# Patient Record
Sex: FEMALE | Race: WHITE | NOT HISPANIC OR LATINO | Employment: UNEMPLOYED | URBAN - METROPOLITAN AREA
[De-identification: names, ages, dates, MRNs, and addresses within clinical notes are randomized per-mention and may not be internally consistent; named-entity substitution may affect disease eponyms.]

---

## 2017-09-01 ENCOUNTER — CONVERSION ENCOUNTER (OUTPATIENT)
Dept: MAMMOGRAPHY | Facility: HOSPITAL | Age: 52
End: 2017-09-01

## 2018-01-25 ENCOUNTER — CONVERSION ENCOUNTER (OUTPATIENT)
Dept: FAMILY MEDICINE CLINIC | Facility: CLINIC | Age: 53
End: 2018-01-25

## 2018-01-25 ENCOUNTER — OFFICE VISIT CONVERTED (OUTPATIENT)
Dept: FAMILY MEDICINE CLINIC | Facility: CLINIC | Age: 53
End: 2018-01-25
Attending: NURSE PRACTITIONER

## 2018-05-11 ENCOUNTER — OFFICE VISIT CONVERTED (OUTPATIENT)
Dept: FAMILY MEDICINE CLINIC | Facility: CLINIC | Age: 53
End: 2018-05-11
Attending: NURSE PRACTITIONER

## 2020-04-13 ENCOUNTER — OFFICE VISIT CONVERTED (OUTPATIENT)
Dept: FAMILY MEDICINE CLINIC | Facility: CLINIC | Age: 55
End: 2020-04-13
Attending: NURSE PRACTITIONER

## 2020-04-29 ENCOUNTER — OFFICE VISIT CONVERTED (OUTPATIENT)
Dept: OTHER | Facility: HOSPITAL | Age: 55
End: 2020-04-29
Attending: PHYSICIAN ASSISTANT

## 2020-04-29 ENCOUNTER — HOSPITAL ENCOUNTER (OUTPATIENT)
Dept: OTHER | Facility: HOSPITAL | Age: 55
Discharge: HOME | End: 2020-04-29
Attending: PHYSICIAN ASSISTANT

## 2020-04-30 LAB — SARS-COV-2 RNA SPEC QL NAA+PROBE: NOT DETECTED

## 2020-05-11 ENCOUNTER — HOSPITAL ENCOUNTER (OUTPATIENT)
Dept: OTHER | Facility: HOSPITAL | Age: 55
Discharge: HOME | End: 2020-05-11
Attending: NURSE PRACTITIONER

## 2020-05-11 LAB
25(OH)D3 SERPL-MCNC: 47.5 NG/ML (ref 30–100)
ALBUMIN SERPL-MCNC: 3.8 G/DL (ref 3.5–5)
ALBUMIN/GLOB SERPL: 1.5 {RATIO} (ref 1.4–2.6)
ALP SERPL-CCNC: 106 U/L (ref 53–141)
ALT SERPL-CCNC: 10 U/L (ref 10–40)
ANION GAP SERPL CALC-SCNC: 12 MMOL/L (ref 8–19)
AST SERPL-CCNC: 18 U/L (ref 15–50)
BASOPHILS # BLD AUTO: 0.04 10*3/UL (ref 0–0.2)
BASOPHILS NFR BLD AUTO: 0.8 % (ref 0–3)
BILIRUB SERPL-MCNC: 0.34 MG/DL (ref 0.2–1.3)
BUN SERPL-MCNC: 19 MG/DL (ref 5–25)
BUN/CREAT SERPL: 26 {RATIO} (ref 6–20)
CALCIUM SERPL-MCNC: 9.1 MG/DL (ref 8.7–10.4)
CHLORIDE SERPL-SCNC: 101 MMOL/L (ref 99–111)
CHOLEST SERPL-MCNC: 142 MG/DL (ref 107–200)
CHOLEST/HDLC SERPL: 1.8 {RATIO} (ref 3–6)
CONV ABS IMM GRAN: 0.01 10*3/UL (ref 0–0.2)
CONV CO2: 28 MMOL/L (ref 22–32)
CONV IMMATURE GRAN: 0.2 % (ref 0–1.8)
CONV TOTAL PROTEIN: 6.4 G/DL (ref 6.3–8.2)
CREAT UR-MCNC: 0.72 MG/DL (ref 0.5–0.9)
DEPRECATED RDW RBC AUTO: 49.1 FL (ref 36.4–46.3)
EOSINOPHIL # BLD AUTO: 0.27 10*3/UL (ref 0–0.7)
EOSINOPHIL # BLD AUTO: 5.1 % (ref 0–7)
ERYTHROCYTE [DISTWIDTH] IN BLOOD BY AUTOMATED COUNT: 14.1 % (ref 11.7–14.4)
GFR SERPLBLD BASED ON 1.73 SQ M-ARVRAT: >60 ML/MIN/{1.73_M2}
GLOBULIN UR ELPH-MCNC: 2.6 G/DL (ref 2–3.5)
GLUCOSE SERPL-MCNC: 99 MG/DL (ref 65–99)
HCT VFR BLD AUTO: 36.7 % (ref 37–47)
HDLC SERPL-MCNC: 77 MG/DL (ref 40–60)
HGB BLD-MCNC: 11.7 G/DL (ref 12–16)
LDLC SERPL CALC-MCNC: 58 MG/DL (ref 70–100)
LYMPHOCYTES # BLD AUTO: 1.04 10*3/UL (ref 1–5)
LYMPHOCYTES NFR BLD AUTO: 19.8 % (ref 20–45)
MCH RBC QN AUTO: 30.3 PG (ref 27–31)
MCHC RBC AUTO-ENTMCNC: 31.9 G/DL (ref 33–37)
MCV RBC AUTO: 95.1 FL (ref 81–99)
MONOCYTES # BLD AUTO: 0.43 10*3/UL (ref 0.2–1.2)
MONOCYTES NFR BLD AUTO: 8.2 % (ref 3–10)
NEUTROPHILS # BLD AUTO: 3.47 10*3/UL (ref 2–8)
NEUTROPHILS NFR BLD AUTO: 65.9 % (ref 30–85)
NRBC CBCN: 0 % (ref 0–0.7)
OSMOLALITY SERPL CALC.SUM OF ELEC: 286 MOSM/KG (ref 273–304)
PLATELET # BLD AUTO: 270 10*3/UL (ref 130–400)
PMV BLD AUTO: 9.1 FL (ref 9.4–12.3)
POTASSIUM SERPL-SCNC: 4.4 MMOL/L (ref 3.5–5.3)
RBC # BLD AUTO: 3.86 10*6/UL (ref 4.2–5.4)
SODIUM SERPL-SCNC: 137 MMOL/L (ref 135–147)
TRIGL SERPL-MCNC: 36 MG/DL (ref 40–150)
VIT B12 SERPL-MCNC: 351 PG/ML (ref 211–911)
VLDLC SERPL-MCNC: 7 MG/DL (ref 5–37)
WBC # BLD AUTO: 5.26 10*3/UL (ref 4.8–10.8)

## 2020-07-13 ENCOUNTER — CONVERSION ENCOUNTER (OUTPATIENT)
Dept: CARDIOLOGY | Facility: CLINIC | Age: 55
End: 2020-07-13
Attending: INTERNAL MEDICINE

## 2020-07-13 ENCOUNTER — OFFICE VISIT CONVERTED (OUTPATIENT)
Dept: CARDIOLOGY | Facility: CLINIC | Age: 55
End: 2020-07-13
Attending: INTERNAL MEDICINE

## 2020-08-19 ENCOUNTER — OFFICE VISIT CONVERTED (OUTPATIENT)
Dept: INTERNAL MEDICINE | Facility: CLINIC | Age: 55
End: 2020-08-19
Attending: PHYSICIAN ASSISTANT

## 2020-09-21 ENCOUNTER — HOSPITAL ENCOUNTER (OUTPATIENT)
Dept: OTHER | Facility: HOSPITAL | Age: 55
Discharge: HOME | End: 2020-09-21
Attending: PHYSICIAN ASSISTANT

## 2020-09-21 ENCOUNTER — OFFICE VISIT CONVERTED (OUTPATIENT)
Dept: INTERNAL MEDICINE | Facility: CLINIC | Age: 55
End: 2020-09-21
Attending: PHYSICIAN ASSISTANT

## 2020-09-21 ENCOUNTER — CONVERSION ENCOUNTER (OUTPATIENT)
Dept: INTERNAL MEDICINE | Facility: CLINIC | Age: 55
End: 2020-09-21

## 2020-09-22 LAB
C TRACH RRNA CVX QL NAA+PROBE: NOT DETECTED
N GONORRHOEA DNA SPEC QL NAA+PROBE: NOT DETECTED

## 2020-09-25 ENCOUNTER — OFFICE VISIT CONVERTED (OUTPATIENT)
Dept: INTERNAL MEDICINE | Facility: CLINIC | Age: 55
End: 2020-09-25
Attending: PHYSICIAN ASSISTANT

## 2020-09-28 LAB
CONV LAST MENSTURAL PERIOD: NORMAL
PATHOLOGIST REVIEW: NORMAL
SPECIMEN SOURCE: NORMAL
SPECIMEN SOURCE: NORMAL
THIN PREP CVX: NORMAL

## 2020-10-02 LAB — HPV HYBRID CAPTURE HIGH RISK: POSITIVE

## 2020-12-08 ENCOUNTER — OFFICE VISIT CONVERTED (OUTPATIENT)
Dept: INTERNAL MEDICINE | Facility: CLINIC | Age: 55
End: 2020-12-08
Attending: PHYSICIAN ASSISTANT

## 2021-02-08 ENCOUNTER — OFFICE VISIT CONVERTED (OUTPATIENT)
Dept: FAMILY MEDICINE CLINIC | Facility: CLINIC | Age: 56
End: 2021-02-08
Attending: NURSE PRACTITIONER

## 2021-02-08 ENCOUNTER — HOSPITAL ENCOUNTER (OUTPATIENT)
Dept: FAMILY MEDICINE CLINIC | Facility: CLINIC | Age: 56
Discharge: HOME | End: 2021-02-08
Attending: NURSE PRACTITIONER

## 2021-04-02 ENCOUNTER — OFFICE VISIT CONVERTED (OUTPATIENT)
Dept: FAMILY MEDICINE CLINIC | Facility: CLINIC | Age: 56
End: 2021-04-02
Attending: NURSE PRACTITIONER

## 2021-05-10 ENCOUNTER — HOSPITAL ENCOUNTER (OUTPATIENT)
Dept: OTHER | Facility: HOSPITAL | Age: 56
Discharge: HOME | End: 2021-05-10
Attending: NURSE PRACTITIONER

## 2021-05-10 ENCOUNTER — CONVERSION ENCOUNTER (OUTPATIENT)
Dept: FAMILY MEDICINE CLINIC | Facility: CLINIC | Age: 56
End: 2021-05-10

## 2021-05-10 ENCOUNTER — HOSPITAL ENCOUNTER (OUTPATIENT)
Dept: FAMILY MEDICINE CLINIC | Facility: CLINIC | Age: 56
Discharge: HOME | End: 2021-05-10
Attending: NURSE PRACTITIONER

## 2021-05-10 ENCOUNTER — OFFICE VISIT CONVERTED (OUTPATIENT)
Dept: FAMILY MEDICINE CLINIC | Facility: CLINIC | Age: 56
End: 2021-05-10
Attending: NURSE PRACTITIONER

## 2021-05-10 LAB
ALBUMIN SERPL-MCNC: 3.9 G/DL (ref 3.5–5)
ALBUMIN/GLOB SERPL: 1.4 {RATIO} (ref 1.4–2.6)
ALP SERPL-CCNC: 82 U/L (ref 53–141)
ALT SERPL-CCNC: 12 U/L (ref 10–40)
ANION GAP SERPL CALC-SCNC: 11 MMOL/L (ref 8–19)
AST SERPL-CCNC: 20 U/L (ref 15–50)
BASOPHILS # BLD AUTO: 0.05 10*3/UL (ref 0–0.2)
BASOPHILS NFR BLD AUTO: 0.8 % (ref 0–3)
BILIRUB SERPL-MCNC: 0.31 MG/DL (ref 0.2–1.3)
BILIRUB UR QL STRIP: NEGATIVE
BUN SERPL-MCNC: 18 MG/DL (ref 5–25)
BUN/CREAT SERPL: 28 {RATIO} (ref 6–20)
CALCIUM SERPL-MCNC: 8.8 MG/DL (ref 8.7–10.4)
CHLORIDE SERPL-SCNC: 104 MMOL/L (ref 99–111)
COLOR UR: YELLOW
CONV ABS IMM GRAN: 0.01 10*3/UL (ref 0–0.2)
CONV CLARITY OF URINE: CLEAR
CONV CO2: 28 MMOL/L (ref 22–32)
CONV IMMATURE GRAN: 0.2 % (ref 0–1.8)
CONV PROTEIN IN URINE BY AUTOMATED TEST STRIP: NEGATIVE
CONV TOTAL PROTEIN: 6.6 G/DL (ref 6.3–8.2)
CONV UROBILINOGEN IN URINE BY AUTOMATED TEST STRIP: NORMAL
CREAT UR-MCNC: 0.65 MG/DL (ref 0.5–0.9)
DEPRECATED RDW RBC AUTO: 48.9 FL (ref 36.4–46.3)
EOSINOPHIL # BLD AUTO: 0.22 10*3/UL (ref 0–0.7)
EOSINOPHIL # BLD AUTO: 3.6 % (ref 0–7)
ERYTHROCYTE [DISTWIDTH] IN BLOOD BY AUTOMATED COUNT: 14.5 % (ref 11.7–14.4)
EST. AVERAGE GLUCOSE BLD GHB EST-MCNC: 111 MG/DL
FERRITIN SERPL-MCNC: 12 NG/ML (ref 10–200)
GFR SERPLBLD BASED ON 1.73 SQ M-ARVRAT: >60 ML/MIN/{1.73_M2}
GLOBULIN UR ELPH-MCNC: 2.7 G/DL (ref 2–3.5)
GLUCOSE SERPL-MCNC: 99 MG/DL (ref 65–99)
GLUCOSE UR QL: NEGATIVE
HBA1C MFR BLD: 5.5 % (ref 3.5–5.7)
HCT VFR BLD AUTO: 35.8 % (ref 37–47)
HGB BLD-MCNC: 11.3 G/DL (ref 12–16)
HGB UR QL STRIP: NEGATIVE
IRON SATN MFR SERPL: 11 % (ref 20–55)
IRON SERPL-MCNC: 49 UG/DL (ref 60–170)
KETONES UR QL STRIP: NEGATIVE
LEUKOCYTE ESTERASE UR QL STRIP: NEGATIVE
LYMPHOCYTES # BLD AUTO: 0.97 10*3/UL (ref 1–5)
LYMPHOCYTES NFR BLD AUTO: 15.8 % (ref 20–45)
MCH RBC QN AUTO: 28.8 PG (ref 27–31)
MCHC RBC AUTO-ENTMCNC: 31.6 G/DL (ref 33–37)
MCV RBC AUTO: 91.1 FL (ref 81–99)
MONOCYTES # BLD AUTO: 0.36 10*3/UL (ref 0.2–1.2)
MONOCYTES NFR BLD AUTO: 5.9 % (ref 3–10)
NEUTROPHILS # BLD AUTO: 4.53 10*3/UL (ref 2–8)
NEUTROPHILS NFR BLD AUTO: 73.7 % (ref 30–85)
NITRITE UR QL STRIP: NEGATIVE
NRBC CBCN: 0 % (ref 0–0.7)
OSMOLALITY SERPL CALC.SUM OF ELEC: 290 MOSM/KG (ref 273–304)
PH UR STRIP.AUTO: 6 [PH]
PLATELET # BLD AUTO: 304 10*3/UL (ref 130–400)
PMV BLD AUTO: 9.9 FL (ref 9.4–12.3)
POTASSIUM SERPL-SCNC: 4.2 MMOL/L (ref 3.5–5.3)
RBC # BLD AUTO: 3.93 10*6/UL (ref 4.2–5.4)
SODIUM SERPL-SCNC: 139 MMOL/L (ref 135–147)
SP GR UR: 1.01
TIBC SERPL-MCNC: 449 UG/DL (ref 245–450)
TRANSFERRIN SERPL-MCNC: 314 MG/DL (ref 250–380)
VIT B12 SERPL-MCNC: 461 PG/ML (ref 211–911)
WBC # BLD AUTO: 6.14 10*3/UL (ref 4.8–10.8)

## 2021-05-10 NOTE — H&P
History and Physical      Patient Name: Esme Mcneil   Patient ID: [id number]   Sex: Female   YOB: 1965    Primary Care Provider: Herbie LEE    Visit Date: July 13, 2020    Provider: Shahid Jenkins MD   Location: [facility]   Location Address: [address] [place] [address], [address]   T[place]NE Palacios  [id number]   Location Phone: [phone]          Chief Complaint  · Chest pain   · Establish cardiologist      History Of Present Illness  Consult requested by: Herbie LEE   Esme Mcneil is a 55-year-old white female who is new to the area and comes to establish a cardiologist, but she also complains of chest pain once a week, described as sharp, down the left sternal border, lasts 20 to 30 minutes, not associated with activities. Mostly she says it is under a lot of stress. She does not take any nitroglycerin for it. She does have some palpitations described as a fluttery sensation. It is nonsustained, is long term and normal. She noted swelling which is mild and long term. Denies any shortness of breath. Has occasional dizziness when she gets up too fast, but no syncope, PND, or orthopnea.   PAST MEDICAL HISTORY: Hypertension; sleep apnea but she is not on a machine now because she had gastric bypass in 2002 and lost a large amount of weight; heart murmur; congestive heart failure; circulation issues with lymphedema in her lower legs.   PAST HOSPITALIZATIONS/SURGERIES: Stent to LAD in 2013; gastric bypass in 2002.   FAMILY HISTORY: Positive for diabetes and hypertension. Strongly positive for heart disease with a brother having a fatal myocardial infarction at 51. Paternal grandparents also having heart disease but not sure ages but both grandmothers have some type of heart disease, she is not sure what.   CURRENT MEDICATIONS: include Levocetirizine 5 mg daily; Valacyclovir 500 mg b.i.d.; Hydroxyzine 50 mg b.i.d.; vitamin B12 injection p.r.n.; Montelukast 10 mg daily; aspirin  81 mg daily; Trintellix 20 mg daily; vitamin D; vitamin E; Linzess 145 mcg daily; Omeprazole 40 mg daily; Epinephrine injection. The dosage and frequency of the medications were reviewed with the patient.   CHOLESTEROL STATUS: Unknown, taken in April, is not on statin. Triglyceride 36, total cholesterol 1.2, LDL 58, HDL 77. She said she had myalgias with Lipitor in the past.   PSYCHOSOCIAL HISTORY: Positive for mood change or depression. She is . She takes caffeinated pills, two in the morning to get moving. She has never used any alcohol. She used to smoke three packs a week for 6 to 7 years but quit about 22 years ago. 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   ALLERGIES: Ambien; Ampicillin; Cephalexin; Codeine; Hydromorphone; Hydrocodone; Ibuprofen; Lisinopril; Mirtazapine; Gabapentin; Amlodipine; Percocet; Prednisone; sulfa antibiotics; Vicodin.       Review of Systems  · Constitutional  o Admits  o : fatigue, recent weight changes   o Denies  o : good general health lately  · Eyes  o Denies  o : double vision  · HENT  o Admits  o : chronic sinus problem  o Denies  o : hearing loss or ringing, swollen glands in neck  · Cardiovascular  o Admits  o : chest pain, palpitations (fast, fluttering, or skipping beats), swelling (feet, ankles, hands)  o Denies  o : shortness of breath while walking or lying flat  · Respiratory  o Admits  o : asthma or wheezing  o Denies  o : shortness of breath, COPD  · Gastrointestinal  o Admits  o : nausea or vomiting  o Denies  o : ulcers  · Neurologic  o Admits  o : lightheaded or dizzy, headaches  o Denies  o : stroke  · Musculoskeletal  o Admits  o : joint pain, back pain  · Endocrine  o Admits  o : heat or cold intolerance  o Denies  o : thyroid disease, diabetes, excessive thirst or urination  · Heme-Lymph  o Admits  o : bleeding or bruising tendency, anemia     Overall she feels like her health is poor.  She is positive for weight change a long  "time ago since her bypass in 2002.  She went from 378 to the current 179 pounds.       Vitals  Date Time BP Position Site L\R Cuff Size HR RR TEMP (F) WT  HT  BMI kg/m2 BSA m2 O2 Sat HC       07/13/2020 10:04 /66 Sitting    78 - R   179lbs 0oz 5'  6.5\" 28.46 1.95     07/13/2020 10:04 /82 Sitting    78 - R                 Physical Examination  · Constitutional  o Appearance  o : Awake, alert in no acute distress.  · Eyes  o Conjunctivae  o : Conjunctivae normal.  o Pupils and Irises  o : Normal grade 1 fundoscopic exam.  · Ears, Nose, Mouth and Throat  o Oral Cavity  o :   § Oral Mucosa  § : Normal oral mucosa.  · Neck  o Inspection/Palpation  o : No JVD. No bruits noted. No lymphadenopathy. Good carotid upstroke.  · Respiratory  o Respiratory  o : Increased AP diameter with diminished breath sounds. Prolonged expiration. Negative rales or rhonchi.   · Cardiovascular  o Heart  o : PMI is displaced. S1, S2 regular. No S3. No S4. 2/6 systolic murmur at the base heard throughout the precordium. Negative diastolic murmur.  o Peripheral Vascular System  o :   § Extremities  § : Good femoral and pedal pulses. Chronic lower edema.   · Gastrointestinal  o Abdominal Examination  o : Soft. No masses or tenderness felt. No hepatosplenomegaly. Abdominal aorta is not palpable.  · Musculoskeletal  o General  o : Muscle strength is normal with normal tone.  · Skin and Subcutaneous Tissue  o General Inspection  o : Within normal limits.     Echocardiogram was ordered and reviewed.           Assessment     1.  Coronary artery disease with previous stent to the LAD.   2.  Chest pain.   3.  Hypertension.   4.  Aortic valve regurgitation.       Plan     1.  Will do a stress test to evaluate her chest pain.   2.  Start Crestor 5 mg a day due to her coronary artery disease since cholesterols are good now without a        statin.       ROSA Denson/Shahid Jenkins MD, FACC  JF/PMM/pap    This note was transcribed by Soumya " Bethany. TIAN/PMM/pap  The above service was transcribed by Soumya Sims on my behalf and I attest to the accuracy of the note.  TIAN/PM             Electronically Signed by: Petty Sims-, Other -Author on July 14, 2020 08:38:18 AM  Electronically Co-signed by: ROSA Celis -Reviewer on July 19, 2020 08:39:52 AM  Electronically Co-signed by: Shahid Jenkins MD -Reviewer on July 20, 2020 07:40:32 AM

## 2021-05-10 NOTE — H&P
History and Physical      Patient Name: Esme Mcneil   Patient ID: [id number]   Sex: Female   YOB: 1965    Primary Care Provider: Herbie LEE    Visit Date: April 29, 2020    Provider: Candy Ng PA-C   Location: [facility]   Location Address: [address] [place] [address]  [id number]   Location Phone: [phone]          Chief Complaint  · Evaluation for Respiratory Virus      History Of Present Illness  Esme Mcneil is a 54 year old /White female who presents today to the clinic for evaluation of a respiratory virus.   Date of Onset: 04/25/2020   What Symptoms: fatigue , fever, and shortness of breath   Quality of Symptoms: Patient is been having symptoms since Saturday night. The symptoms include a fever with a T-max of 100.4, shortness of breath dizziness and fatigue. She has multiple medical comorbidities including heart disease murmur and asthma. Her primary care physician sent her here to be evaluated based on her symptoms..   Anything make it worse or better: No improvement   Severity of Symptoms: Moderately bothersome   Any known Exposure to COVID-19: No known exposure       Past Medical History  ADHD; Alcoholism in remission; Allergic rhinitis due to allergen; Anemia; Anxiety disorder; Asthma; Broken Bones; Constipation; Depression; Essential hypertension; Gall stones; Genital herpes; Head injury; Heart Disease; Heart Murmur; Hemorrhoids; Hypertension; Kidney stones; Migraine Headaches; Night sweats; Post Traumatic Stress Disorder; Reflux Disease; Seizure; Sinus trouble; STD exposure         Past Surgical History  Adenoidectomy; Appendectomy; carpal tunnel; Cesarian Section; Cholecstectomy; Colonoscopy; Excision of uvula; Fatty TUMOR; Gastric Bypass; Other; Stent placment; Tonsilectomy; Tummy tuck         Medication List  albuterol sulfate 90 mcg/actuation inhalation HFA aerosol inhaler; aspirin 81 mg oral tablet,delayed release (DR/EC);  cyanocobalamin (vitamin B-12) 1,000 mcg/mL injection solution; hydroxyzine HCl 50 mg oral tablet; montelukast 10 mg oral tablet; omeprazole 40 mg oral capsule,delayed release(DR/EC); Trintellix 20 mg oral tablet; valacyclovir 500 mg oral tablet; Vitamin D3 2,000 unit oral capsule; Xyzal 5 mg oral tablet         Allergy List  Ambien; ampicillin; Bananas; Bee Stings; Cephalexin adverse reaction; Cockroach; Codiene; [place] Pollen; Darvocet-N 100; Dilaudid; Fescue Pollen; Garlic; Horses; hydrocodone-acetaminophen; ibuprofen; Keflex; lisinopril; Lortab; Neurontin; Norvasc; Percocet; prednisone; Remeron; Seafood; Shrimp allergy; Strawberry; SULFA (SULFONAMIDES); Tessalon Perles; Vicodin; Wasp stings       Allergies Reconciled  Family Medical History  Stroke; - No Family History of Colorectal Cancer; Coronary artery disease; Prostate Cancer; Diabetes; Ovarian Cancer, Family History         Reproductive History   3 Para 1 0 0 0 & Postmenopausal       Social History  Alcohol (Former); Tobacco (Former)         Immunizations  Name Date Admin   Influenza Refused   Influenza          Review of Systems  · Constitutional  o Admits  o : fatigue, fever  o Denies  o : weight gain, weight loss  · Respiratory  o Admits  o : shortness of breath, asthma  o Denies  o : cough  · Gastrointestinal  o Denies  o : nausea, vomiting, diarrhea, constipation, abdominal pain  · Integument  o Denies  o : rash  · Neurologic  o Admits  o : dizziness/vertigo  o Denies  o : headache      Vitals  Date Time BP Position Site L\R Cuff Size HR RR TEMP (F) WT  HT  BMI kg/m2 BSA m2 O2 Sat        2020 01:30 PM      69 - R  98.8     96 %          Physical Examination  · Constitutional  o Appearance  o : no acute distress, well-nourished  · Head and Face  o Head  o :   § Inspection  § : atraumatic, normocephalic  · Eyes  o Eyes  o : extraocular movements intact, no scleral icterus, no conjunctival injection  · Ears, Nose, Mouth and  Throat  o Ears  o :   § External Ears  § : normal  § Otoscopic Examination  § : normal tympanic membrane exam  o Nose  o :   § Intranasal Exam  § : sinuses nontender to palpation  o Oral Cavity  o :   § Oral Mucosa  § : moist mucous membranes  o Throat  o :   § Oropharynx  § : normal tonsils, normal oropharynx  · Respiratory  o Respiratory Effort  o : breathing comfortably, symmetric chest rise  o Auscultation of Lungs  o : clear to asculatation bilaterally, no wheezes, rales, or rhonchii  · Cardiovascular  o Heart  o :   § Auscultation of Heart  § : regular rate and rhythm, no murmurs, rubs, or gallops  o Peripheral Vascular System  o :   § Extremities  § : no edema  · Lymphatic  o Neck  o : no lymphadenopathy present  o Supraclavicular Nodes  o : no supraclavicular nodes  · Skin and Subcutaneous Tissue  o General Inspection  o : normal inspection  · Neurologic  o Mental Status Examination  o :   § Orientation  § : grossly oriented to person, place and time  o Gait and Station  o :   § Gait Screening  § : normal gait  · Psychiatric  o General  o : normal mood and affect              Assessment  · Fatigue     780.79/R53.83  · Fever     780.60/R50.9  · Shortness of breath     786.05/R06.02      Plan  · Orders  o [facility] NCOV2 (send-out) (50878) - 780.79/R53.83, 780.60/R50.9, 786.05/R06.02 - 04/29/2020  · Medications  o Medications have been Reconciled  o Transition of Care or Provider Policy  · Instructions  o Plan of Care:   o Patient instructed to seek medical attention urgently for new or worsening symptoms.  o Patient was educated on their diagnosis, treatment, and medications today.   o Recommend self monitoring. Instructions given.   o Stay home until without fever for 72 hours without using fever-reducing medications and other symptoms are gone.  o Recommends over the counter medications for symptom management.  o Follow-up with PCP in 2-3 days.  o Patient was swabbed for COVID-19. Patient was sent  home with COVID-19 handout information. Patient was informed to self isolate. Patient was given a 3 day work note given the time it takes for lab results to return and for patient to be notified. Further days off if required are explained in COVID test handout given to patient. Patient will be notified of test results. Patient was encouraged to stay hydrated. Patient was educated to use Tylenol if able, as directed. If not, patient is instructed to use fever reducing OTC meds as directed. Patient can use OTC medications as directed for symptoms.   o Electronically Identified Patient Education Materials Provided Electronically            Electronically Signed by: Candy Ng PA-C -Author on April 29, 2020 01:43:00 PM

## 2021-05-10 NOTE — PROCEDURES
"   Procedure Note      Patient Name: Esme Mcneil   Patient ID: [id number]   Sex: Female   YOB: 1965    Primary Care Provider: Herbie LEE    Visit Date: July 13, 2020    Provider: Shahid Jenkins MD   Location: [facility]   Location Address: [address] [place] [address], [address]   [place] Pal[place]  [id number]   Location Phone: [phone]          FINAL REPORT   TRANSTHORACIC ECHOCARDIOGRAM REPORT    Diagnosis: Chest pain, coronary artery disease.   Height: 5'6\" Weight: 179 B/P: 148/66 BSA: 1.91   Tech: JLW   MEASUREMENTS:  RVID (Diastole) : RVID. (NORMAL: 0.7 to 2.4 cm max)   LVID (Systole): 3.3 cm (Diastole): 5 cm . (NORMAL: 3.7 - 5.4 cm)   Posterior Wall Thickness (Diastole): 0.9 cm. (NORMAL: 0.8 - 1.1 cm)   Septal Thickness (Diastole): 1.1 cm. (NORMAL: 0.7 - 1.2 cm)   LAID (Systole): 3.2 cm. (NORMAL: 1.9 - 3.8 cm)   Aortic Root Diameter (Diastole): 2.7 cm. (NORMAL: 2.0 - 3.7 cm)   COMMENTS:  COMMENT: The patient underwent 2-D, M-Mode, and Doppler examination, including pulse-wave, continuous-wave, and color-flow Doppler analysis; the study is technically adequate. The following was observed:   FINDINGS:  MITRAL VALVE: Normal. E to F slope was normal. No evidence of any prolapse.   AORTIC VALVE: Mild to moderate fibrocalcific changes noted with partial fusion of the left and noncoronary cusp with limited opening of the aortic valve leaflets. Aortic valve area by planimetry is 1.65 cm2.   TRICUSPID VALVE: Normal.   PULMONIC VALVE: Normal.   LEFT ATRIUM: Normal; no masses seen. LA volume is 28 mL/m2.   AORTIC ROOT: Normal in size and motion.   LEFT VENTRICLE: The left ventricular chamber size is normal. The left ventricular wall thickness is normal. The left ventricular systolic function is normal with an estimated EF of 60%. No significant regional wall motion abnormalities are identified.   RIGHT ATRIUM: Normal.   RIGHT VENTRICLE: Normal size and function.   PERICARDIUM: No effusion. "   INFERIOR VENA CAVA: Diameter is 1.8 cm greater than 50% reduction with inspiration.   DOPPLER: Pulse-wave, continuous wave, and color-flow Doppler evaluation was performed. E/A ratio is 1.7. DT= 161 msec. IVRT is 119 msec. E/E' is 8. There is mild aortic valve regurgitation present. The pressure half-time if 444 msec and jet height index is 15%. There is mildly increasing velocity across the fibrotic aortic valve with a peak gradient of 16 mmHg, mean gradient of 9 mmHg with a V-max of 2.0 m/sec and an estimated aortic valve area of 2 cm2. There is tricuspid regurgitation with normal estimated pulmonary artery systolic pressure.   Faxed: 07/14/2020      CONCLUSION:  1.  Normal left ventricular chamber size with normal left ventricular systolic function.   2.  Left ventricular diastolic dysfunction.   3.  Mild aortic valve stenosis and aortic valve regurgitation.   4.  Tricuspid regurgitation with normal estimated pulmonary artery systolic pressure.       Shahid Jenkins MD, [facility]  PM/pap    This note was transcribed by Soumya Sims.  pap/pm  The above service was transcribed by Soumya Sims, and I attest to the accuracy of the note.  PM                 Electronically Signed by: Petty Sims-, Other -Author on July 14, 2020 08:08:53 AM  Electronically Co-signed by: Shahid Jenkins MD -Reviewer on July 29, 2020 05:48:55 PM

## 2021-05-10 NOTE — H&P
"   History and Physical      Patient Name: Esme Mcneil   Patient ID: [id number]   Sex: Female   YOB: 1965    Primary Care Provider: Priscilla LEE    Visit Date: February 8, 2021    Provider: ROSA Crespo   [place] [facility] [place] [address] [place] [address], [address]  [place], [place]  [id number]   Location Phone: [phone]          Chief Complaint  · establishing care  · twitching in toes      History Of Present Illness  Esme Mcneil is a 55 year old /White female who presents for evaluation and treatment of:      She is here as a new patient to establish care.  She was a previous patient of ROSA Schaeffer.  She is most recently seen RAÚL Rosas and most recently RAÚL Brady at [facility].  She states she had a Pap smear done that she was told was abnormal.  She was sent to OB/GYN Dr. Momin who repeated a Pap smear and she told her Pap smear was normal.    History of major depression and anxiety: Her PHQ-9 score 17, no suicidal ideation.  She is currently on Trintellix 20 mg daily.  She states she has been out of her hydroxyzine.  She has multiple drug allergies.  She was supposed to be referred to psychiatry Dr. Rodriguez per her last visit with Bernadette Jovel.  On the referral note it states that psychiatry has been trying to get in touch with her.  Patient states she recently got a new phone.    She states she has a history of diabetes type 2.  She has had gastric bypass surgery and has reversed her diabetes.  She states sometimes she is having low blood sugars now.  She has gained weight recently.  She is complaining of her \"toes twitching.\"  She states she has \"lymphedema\" of her lower extremities.    History of anemia, states also history of vitamin B12 deficiency but has not had vitamin B12 in a long time.    She is due for colorectal screening.  She has had a Cologuard last on 2/16/2018.  Her last mammogram was 9/1/2017. "       Past Medical History  Disease Name Date Onset Notes   ADHD --  --    Alcoholism in remission --  --    Allergic rhinitis due to allergen 08/15/2017 --    Anemia --  --    Anxiety disorder 05/02/2017 --    Asthma 08/15/2017 --    Breast cancer screening 9/1/2017 --    Broken Bones --  --    Congestive Heart Failure --  --    Constipation --  --    Depression 05/02/2017 --    Diabetes --  --    Essential hypertension 05/02/2017 --    Gall stones --  --    Genital herpes 08/15/2017 --    GERD (gastroesophageal reflux disease) 08/20/2020 Discussed symptoms with patient. Encouraged patient to add Famotidine to current med regimen rather than Bentyl. Patient will follow-up in 3mos and we will see if Famotidine helped with her symptoms. Patient understood and agreed with plan.   Head injury --  --    Head injury --  --    Heart attack --  --    Heart disease --  --    Heart Murmur --  --    Hemorrhoids --  --    Hyperlipidemia 08/20/2020 --    Hypertension --  --    IBS (irritable bowel syndrome) 08/20/2020 discussed risks of long term bentyl. Will see if sx improve with better tx of GERD sx. pt not currently having diarrhea or constipation, bloating or pain.   Kidney stones --  --    Migraine Headaches --  --    Night sweats --  --    Post Traumatic Stress Disorder --  --    Reflux Disease --  --    Seizure --  --    Shortness of Breath --  --    Sinus trouble --  --    STD exposure --  --          Past Surgical History  Procedure Name Date Notes   Adenoidectomy 1996 --    Appendectomy 1975 --    carpal tunnel --  1992 right wrist - 2003 Left hand - left hand 7-7-2016   Cesarian Section 1989 --    Cholecstectomy 2002 --    Colonoscopy 2/16/2018 cologard   Excision of uvula 1996 --    Fatty TUMOR 1993 & 2002 --    Gastric Bypass 2002 rour-en-y   Other --  tubes and overy removal 2006- removal of skin on arms 2006   Stent placment 09- --    Tonsilectomy 1996 --    Tummy tuck 2006 --          Medication  List  Name Date Started Instructions   albuterol sulfate 90 mcg/actuation inhalation HFA aerosol inhaler  inhale 2 puffs by inhalation route QD as needed   aspirin 81 mg oral tablet,delayed release (/EC) 07/25/2018 take 1 tablet (81 mg) by oral route once daily   caffeine 200 mg oral tablet  take 1 tablet by oral route daily   clonidine HCl 0.1 mg oral tablet 12/23/2020 TAKE 1 TABLET BY MOUTH EVERY NIGHT AT BEDTIME AS NEEDED FOR HOT FLASHES AND BLOOD PRESSURE   EpiPen 2-Mundo 0.3 mg/0.3 mL injection auto-injector 01/06/2021 inject 0.3 milliliter (0.3 mg) by intramuscular route once as needed for anaphylaxis   hydroxyzine HCl 50 mg oral tablet 02/04/2021 take 1 tablet (50 mg) by oral route 3 times a day.   levocetirizine 5 mg oral tablet  take 1 tablet (5 mg) by oral route once daily at bedtime   montelukast 10 mg oral tablet 09/25/2020 take 1 tablet by oral route daily for allergies   omeprazole 40 mg oral capsule,delayed release(DR/EC) 09/25/2020 take 1 capsule by oral route daily before meal for reflux   Trintellix 20 mg oral tablet 10/02/2020 take 1 tablet by oral route daily for 90 days for mood   valacyclovir 1 gram oral tablet 09/25/2020 take 1 tablet (1,000 mg) by oral route once daily for 90 days   Vitamin C 500 mg oral tablet  take 1 tablet by oral route daily   Vitamin D3 2,000 unit oral capsule 07/25/2018 take 1 capsule by oral route daily for 90 days   Xyzal 5 mg oral tablet 09/25/2020 take 1 tablet by oral route daily for 90 days at bedtime for allergies         Allergy List  Allergen Name Date Reaction Notes   Adderall --  --  --    Ambien --  --  --    ampicillin --  --  --    Bananas --  --  --    Bee Stings --  --  --    Brintellix --  --  --    Cephalexin adverse reaction --  --  --    Cockroach --  --  --    Codiene --  --  --    Haywood Pollen --  --  --    Cymbalta --  --  --    Darvocet-N 100 --  --  --    Dilaudid --  --  --    Effexor --  --  --    Fescue Pollen --  --  --    Garlic --  --   --    Horses --  --  --    hydrocodone-acetaminophen --  --  --    ibuprofen --  --  --    Keflex --  --  --    Lexapro --  --  --    lisinopril --  --  --    Lortab --  --  --    Neurontin --  --  --    Norvasc --  --  --    Paxil --  --  --    Percocet --  --  --    prednisone --  --  vomiting   Prozac --  --  --    Remeron --  --  --    Seafood --  --  --    Seroquel --  --  --    Serzone --  --  --    Shrimp allergy --  --  --    Strawberry --  --  --    SULFA (SULFONAMIDES) --  --  --    Tessalon Perles --  --  --    trazodone --  --  --    Vicodin --  --  --    Wasp stings --  --  --    Wellbutrin --  --  --    Zyprexa --  --  --        Allergies Reconciled  Family Medical History  Disease Name Relative/Age Notes   Stroke Grandmother (maternal)/  Grandmother (paternal)/   --    Heart Disease  --    - No Family History of Colorectal Cancer  --    Coronary artery disease Aunt/  Brother/  Grandfather (maternal)/  Grandfather (paternal)/  Grandmother (maternal)/  Grandmother (paternal)/  Mother/  Uncle/   --    Prostate cancer Father/   --    Diabetes Brother/  Daughter/   --    Ovarian Cancer, Family History Aunt/   --          Reproductive History  Menstrual   Menopause Status: Postmenopausal   Pregnancy Summary   Total Pregnancies: 3 Full Term: 1 Premature: 0   Ab Induced: 0 Ab Spontaneous: 0 Ectopics: 0   Multiples: 0 Livin         Social History  Finding Status Start/Stop Quantity Notes   Alcohol Former --/-- --  --    Tobacco Former --/-- --  --          Immunizations  NameDate Admin Mfg Trade Name Lot Number Route Inj VIS Given VIS Publication   Ibqpxduwp18/08/2021 [facility] Fluzone Quadrivalent [id number] IM LD 2021 08/15/2019   Comments: Patient tolerated injection well, left in stable condition.   Domqmunqw1196/13/2019 NE Not Entered  NE NE 2020    Comments:    Tdap08 NE Not Entered  NE NE 2020    Comments:          Review of Systems  · Constitutional  o Denies  o : fever,  "fatigue  · Cardiovascular  o Denies  o : lower extremity edema, claudication, chest pressure, palpitations  · Respiratory  o Denies  o : shortness of breath, wheezing, cough, hemoptysis, dyspnea on exertion  · Gastrointestinal  o Denies  o : nausea, vomiting, diarrhea, constipation, abdominal pain  · Genitourinary  o Denies  o : urgency, frequency  · Integument  o Denies  o : rash, itching  · Neurologic  o Admits  o : difficulty concentrating  o Denies  o : altered mental status  · Musculoskeletal  o Admits  o : muscle cramps  o Denies  o : joint pain, joint swelling  · Endocrine  o Admits  o : central obesity, weight gain  · Psychiatric  o Admits  o : anxiety, depression, difficulty sleeping  o Denies  o : suicidal ideation, homicidal ideation      Vitals  Date Time BP Position Site L\R Cuff Size HR RR TEMP (F) WT  HT  BMI kg/m2 BSA m2 O2 Sat FR L/min FiO2 HC       02/08/2021 01:50 /84 Sitting    84 - R  98 191lbs 0oz 5'  6.5\" 30.37 2.02 98 %            Physical Examination  · Constitutional  o Appearance  o : no acute distress, well-nourished  · Head and Face  o Head  o :   § Inspection  § : atraumatic, normocephalic  · Ears, Nose, Mouth and Throat  o Ears  o :   § External Ears  § : normal  § Otoscopic Examination  § : tympanic membrane appearance within normal limits bilaterally  o Oral Cavity  o :   § Oral Mucosa  § : moist mucous membranes  o Throat  o :   § Oropharynx  § : no inflammation or lesions present  · Neck  o Thyroid  o : gland size normal, nontender, no nodules or masses present on palpation, symmetric  · Respiratory  o Respiratory Effort  o : breathing comfortably, symmetric chest rise  o Auscultation of Lungs  o : clear to asculatation bilaterally, no wheezes, rales, or rhonchii  · Cardiovascular  o Heart  o :   § Auscultation of Heart  § : regular rate and rhythm, no murmurs, rubs, or gallops  o Peripheral Vascular System  o :   § Extremities  § : marked lower extremity edema present "   · Lymphatic  o Neck  o : no lymphadenopathy present  · Neurologic  o Mental Status Examination  o :   § Orientation  § : grossly oriented to person, place and time  o Gait and Station  o :   § Gait Screening  § : normal gait  · Psychiatric  o General  o : normal mood and affect          Assessment  · Need for influenza vaccination     V04.81/Z23  · Screening for colon cancer     V76.51/Z12.11  · Visit for screening mammogram     V76.12/Z12.31  · Anemia     285.9/D64.9  We will check labs today.  · Anxiety disorder     300.00/F41.9  I will refill her hydroxyzine today.  · Major depressive disorder     296.20/F32.2  I advised patient that psychiatry Dr. Rodriguez's office has been trying to reach her. I gave her their phone number so she can call make an appointment.  · History of diabetes mellitus, type II     V12.29/Z86.39  We will check A1c today.  · Abnormal Pap smear of cervix     795.00/R87.619  I advised her that she should have her Pap smear every year due to her history of abnormal ones and history of HPV.  · HPV (human papilloma virus) infection     079.4/B97.7  · History of gastric bypass     V45.86/Z98.84      Plan  · Orders  o Immunization Admin Fee (Single) ([facility]) (69387) - V04.81/Z23 - 02/08/2021  o Fluzone Quadrivalent Vaccine, age 6 months + (13552) - V04.81/Z23 - 02/08/2021   Vaccine - Influenza; Dose: 0.5; Site: Left Deltoid; Route: Intramuscular; Date: 02/08/2021 14:45:00; Exp: 06/30/2021; Lot: [id number]; Mfg: sanofi pasteur; TradeName: Fluzone Quadrivalent; Administered By: Charly Maharaj MA; Comment: Patient tolerated injection well, left in stable condition.  o Cologuard (72014) - V76.51/Z12.11 - 02/08/2021  o Screening Mammography; Bilateral 3D (76544, , 03314) - V76.12/Z12.31 - 02/08/2021  o B12 Folate levels (B12FO) - 285.9/D64.9 - 02/08/2021  o CBC with Auto Diff [facility] (01537) - 285.9/D64.9 - 02/08/2021  o Iron panel (iron, TIBC, transferrin saturation) (09978, 98669, 87514) - 285.9/D64.9 -  02/08/2021  o Ferritin ser/plas (44235) - 285.9/D64.9 - 02/08/2021  o Hgb A1c HM (77126) - V12.29/Z86.39 - 02/08/2021  o Thyroid Profile (65697, 99068, THYII) - 300.00/F41.9, 296.20/F32.2, 285.9/D64.9, V12.29/Z86.39 - 02/08/2021  o ACO-18: Positive screen for clinical depression using a standardized tool and a follow-up plan documented () - - 02/08/2021   17 pts.  o ACO-39: Current medications updated and reviewed (, 1159F) - - 02/08/2021  · Medications  o hydroxyzine HCl 50 mg oral tablet   SIG: take 1 tablet (50 mg) by oral route 3 times a day.   DISP: (90) Tablet with 2 refills  Adjusted on 02/08/2021     o Medications have been Reconciled  o Transition of Care or Provider Policy  · Instructions  o The patient and I discussed the need for therapy, either with a certified counselor, psychologist, and/or family . If no improvement is noted or worsening of their condition, return to office or ER. But also discussed with patient that if they are non-responsive to the type of medication they may need to see a psychiatrist for further evaluation and management.   o Patient was educated/instructed on their diagnosis, treatment and medications prior to discharge from the clinic today.  o Patient instructed to seek medical attention urgently for new or worsening symptoms.  o Call the office with any concerns or questions.  · Disposition  o Return to clinic in 3 months            Electronically Signed by: ROSA Crespo -Author on February 8, 2021 03:16:55 PM

## 2021-05-10 NOTE — H&P
History and Physical      Patient Name: Esme Mcneil   Patient ID: [id number]   Sex: Female   YOB: 1965    Primary Care Provider: Herbie LEE    Visit Date: August 19, 2020    Provider: Ghada Dyson PA-C   Location: [facility]   Location Address: [address] [place] [address], [address]  [place], [place]  [id number]   Location Phone: [phone]          Chief Complaint  · est care       History Of Present Illness  Esme Mcneil is a 55 year old /White female who presents for evaluation and treatment of:      est care previous pcp Herbie Rosenberg   Last labs: 5/2020  last pap: 2017  Last mammo: 2017, she is due  cologard- 2018  tobacco: former smoker quit 1988. smoked for 7 years.     She has a history of bilateral carpal tunnel and has numbness and tingling in her hands. She states she is starting to lose sensation in her finger tips. She states she keeps dropping things. She states she has a lot of pain in both of her hands. She takes OTC Tylenol for her pain, but states it is no longer working. She would like a referral to hand specialist or ortho.    Patient states she has a history of IBS and was taking Bentyl for this, but was taken off because her previous PCP thought the Linzess would work better. She was then put on Linzess, but had to stop taking it due to the diarrhea it caused her to have. She states she has some nausea that comes and goes. Denies diarrhea since stopping the Linzess. Denies vomiting, abdominal pain and constipation.    GERD: has been taking omeprazole. still has gas at times.   Denies cp.    Pt states she is on disability for lymphedema in legs  She is unable to walk far distances due to swelling and pain in legs.   She needs hang tag form for DVM but does not have it notarized.    Mood: has been doing well recently. Denies si/hi.    HTN: has been taking meds as rx.   Janel cp, palpitations, jaw/shoulder pain, dizziness, syncope, loc.    herpes:  takes valacyclovir daily to prevent flare       Past Medical History  Disease Name Date Onset Notes   ADHD --  --    Alcoholism in remission --  --    Allergic rhinitis due to allergen 08/15/2017 --    Anemia --  --    Anxiety disorder 05/02/2017 --    Asthma 08/15/2017 --    Breast cancer screening 9/1/2017 --    Broken Bones --  --    Constipation --  --    Depression 05/02/2017 --    Essential hypertension 05/02/2017 --    Gall stones --  --    Genital herpes 08/15/2017 --    GERD (gastroesophageal reflux disease) 08/20/2020 Discussed symptoms with patient. Encouraged patient to add Famotidine to current med regimen rather than Bentyl. Patient will follow-up in 3mos and we will see if Famotidine helped with her symptoms. Patient understood and agreed with plan.   Head injury --  --    Heart disease --  --    Heart Murmur --  --    Hemorrhoids --  --    Hyperlipidemia 08/20/2020 --    Hypertension --  --    IBS (irritable bowel syndrome) 08/20/2020 discussed risks of long term bentyl. Will see if sx improve with better tx of GERD sx. pt not currently having diarrhea or constipation, bloating or pain.   Kidney stones --  --    Migraine Headaches --  --    Night sweats --  --    Post Traumatic Stress Disorder --  --    Reflux Disease --  --    Seizure --  --    Sinus trouble --  --    STD exposure --  --          Past Surgical History  Procedure Name Date Notes   Adenoidectomy 1996 --    Appendectomy 1975 --    carpal tunnel --  1992 right wrist - 2003 Left hand - left hand 7-7-2016   Cesarian Section 1989 --    Cholecstectomy 2002 --    Colonoscopy 2/16/2018 cologard   Excision of uvula 1996 --    Fatty TUMOR 1993 & 2002 --    Gastric Bypass 2002 rour-en-y   Other --  tubes and overy removal 2006- removal of skin on arms 2006   Stent placment 09- --    Tonsilectomy 1996 --    Tummy tuck 2006 --          Medication List  Name Date Started Instructions   albuterol sulfate 90 mcg/actuation inhalation HFA  aerosol inhaler  inhale 2 puffs by inhalation route QD as needed   aspirin 81 mg oral tablet,delayed release (DR/EC) 07/25/2018 take 1 tablet (81 mg) by oral route once daily   caffeine 200 mg oral tablet  take 1 tablet by oral route daily   carvedilol 3.125 mg oral tablet  take 1 tablet (3.125 mg) by oral route 2 times per day with food   Crestor 5 mg oral tablet  take 1 tablet (5 mg) by oral route once daily   cyanocobalamin (vitamin B-12) 1,000 mcg/mL injection solution  inject 0.1 milliliter once a week, once every 2 weeks, then once a month   EpiPen 2-Mundo 0.3 mg/0.3 mL injection auto-injector 08/03/2020 inject 0.3 milliliter (0.3 mg) by intramuscular route once as needed for anaphylaxis   famotidine 20 mg oral tablet 08/19/2020 take 1 tablet (20 mg) by oral route once daily at bedtime   hydroxyzine HCl 50 mg oral tablet  take 3 tablets (150 mg) by oral route at bedtime   montelukast 10 mg oral tablet 04/13/2020 take 1 tablet (10 mg) by oral route once daily in the evening   omeprazole 40 mg oral capsule,delayed release(DR/EC) 07/20/2020 take 1 capsule (40 mg) by oral route once daily before a meal for 30 days   Trintellix 20 mg oral tablet 07/01/2020 take 1 tablet (20 mg) by oral route once daily at the same time each day for 30 days   valacyclovir 500 mg oral tablet 05/12/2020 take 1 tablet by oral route 2 times a day for 90 days   Vitamin D3 2,000 unit oral capsule 07/25/2018 take 1 capsule by oral route daily for 90 days   Xyzal 5 mg oral tablet 04/13/2020 take 1 tablet (5 mg) by oral route once daily at bedtime         Allergy List  Allergen Name Date Reaction Notes   Adderall --  --  --    Ambien --  --  --    ampicillin --  --  --    Bananas --  --  --    Bee Stings --  --  --    Brintellix --  --  --    Cephalexin adverse reaction --  --  --    Cockroach --  --  --    Codiene --  --  --    [place] Pollen --  --  --    Cymbalta --  --  --    Darvocet-N 100 --  --  --    Dilaudid --  --  --    Effexor  --  --  --    Fescue Pollen --  --  --    Garlic --  --  --    Horses --  --  --    hydrocodone-acetaminophen --  --  --    ibuprofen --  --  --    Keflex --  --  --    Lexapro --  --  --    lisinopril --  --  --    Lortab --  --  --    Neurontin --  --  --    Norvasc --  --  --    Paxil --  --  --    Percocet --  --  --    prednisone --  --  vomiting   Prozac --  --  --    Remeron --  --  --    Seafood --  --  --    Seroquel --  --  --    Serzone --  --  --    Shrimp allergy --  --  --    Strawberry --  --  --    SULFA (SULFONAMIDES) --  --  --    Tessalon Perles --  --  --    trazodone --  --  --    Vicodin --  --  --    Wasp stings --  --  --    Wellbutrin --  --  --    Zyprexa --  --  --        Allergies Reconciled  Family Medical History  Disease Name Relative/Age Notes   Stroke Grandmother (maternal)/  Grandmother (paternal)/   --    - No Family History of Colorectal Cancer  --    Coronary artery disease Aunt/  Brother/  Grandfather (maternal)/  Grandfather (paternal)/  Grandmother (maternal)/  Grandmother (paternal)/  Mother/  Uncle/   --    Prostate cancer Father/   --    Diabetes Brother/  Daughter/   --    Ovarian Cancer, Family History Aunt/   --          Reproductive History  Menstrual   Menopause Status: Postmenopausal   Pregnancy Summary   Total Pregnancies: 3 Full Term: 1 Premature: 0   Ab Induced: 0 Ab Spontaneous: 0 Ectopics: 0   Multiples: 0 Livin         Social History  Finding Status Start/Stop Quantity Notes   Alcohol Former --/-- --  --    Tobacco Former --/-- --  --          Immunizations  NameDate Admin Mfg Trade Name Lot Number Route Inj VIS Given VIS Publication   InfluenzaRefused 2020 NE Not Entered  NE NE     Comments:    [id number]/15/2017 SKB Fluarix, quadrivalent, preservative free [id number] IM RD 09/15/2017 2015   Comments: Pt tolerated injection well   Hizgxanef6229/13/2019 NE Not Entered  NE NE 2020    Comments:    Tdap08 NE Not Entered  NE NE  "08/19/2020    Comments:          Review of Systems  · Constitutional  o Denies  o : fever, fatigue  · Eyes  o Denies  o : blurred vision, changes in vision  · HENT  o Denies  o : headaches, nasal congestion, sore throat  · Cardiovascular  o Admits  o : lower extremity edema  o Denies  o : chest pain, palpitations  · Respiratory  o Denies  o : shortness of breath, frequent cough  · Gastrointestinal  o Admits  o : nausea  o Denies  o : vomiting, diarrhea, constipation, abdominal pain  · Integument  o Denies  o : rash  · Neurologic  o Admits  o : tingling or numbness, dizziness  o Denies  o : tremor, loss of balance  · Musculoskeletal  o Admits  o : joint pain  · Psychiatric  o Denies  o : anxiety, depression, suicidal ideation, homicidal ideation      Vitals  Date Time BP Position Site L\R Cuff Size HR RR TEMP (F) WT  HT  BMI kg/m2 BSA m2 O2 Sat HC       08/15/2017 09:58 /84 Sitting    90 - R 12 98.5 217lbs 0oz 5'  6\" 35.02 2.14 99 %    10/20/2017 10:26 /92 Sitting    106 - R 12 98.3 212lbs 4oz 5'  6\" 34.26 2.12 99 %    01/25/2018 09:05 /87 Sitting    86 - R 12 98.7 209lbs 2oz 5'  6\" 33.75 2.1 97 %    07/13/2020 10:04 /66 Sitting    78 - R   179lbs 0oz 5'  6.5\" 28.46 1.95     08/19/2020 02:50 /100 Sitting    73 - R 15 98.8 184lbs 16oz 5'  6\" 29.86 1.98 98 %          Physical Examination  · Constitutional  o Appearance  o : no acute distress, well-nourished  · Head and Face  o Head  o :   § Inspection  § : atraumatic, normocephalic  · Eyes  o Eyes  o : extraocular movements intact, no scleral icterus, no conjunctival injection  · Ears, Nose, Mouth and Throat  o Ears  o :   § External Ears  § : normal  o Nose  o :   § Intranasal Exam  § : nares patent  o Oral Cavity  o :   § Oral Mucosa  § : moist mucous membranes  · Respiratory  o Respiratory Effort  o : breathing comfortably, symmetric chest rise  o Auscultation of Lungs  o : clear to asculatation bilaterally, no wheezes, rales, or " rhonchii  · Cardiovascular  o Heart  o :   § Auscultation of Heart  § : regular rate and rhythm, no murmurs, rubs, or gallops  o Peripheral Vascular System  o :   § Extremities  § : no edema  · Skin and Subcutaneous Tissue  o General Inspection  o : lymphedema present in BLE, no rash present, no lesions present, no areas of discoloration present  · Neurologic  o Mental Status Examination  o :   § Orientation  § : grossly oriented to person, place and time  o Gait and Station  o :   § Gait Screening  § : normal gait  · Psychiatric  o General  o : normal mood and affect          Assessment  · Visit for screening mammogram     V76.12/Z12.31  Patient is aware she needs an updated mammogram. Discussed difficulty finding a provider due to current insurance.  · Anxiety disorder     300.00/F41.9  Mood stable on current meds  · Asthma     493.90/J45.909  · Depression     311/F32.9  mood stable, cont current meds, to er if si/hi  · Essential hypertension     401.9/I10  Discussed bp elevation at today's visit. Discussed risks of bp elevation including death, mi, stroke, ckd, blindness. Low salt diet, increase exercise. Discussed importance of medication compliance and not missing doses. Continue current meds at this time. We will monitor at 1 wk bp check nurse visit and adjust bp medications if necessary. To er if cp, palpitations, vision loss, unilateral weakness, altered mental status. Pt understands and agrees with plan.  · GERD (gastroesophageal reflux disease)     530.81/K21.9  Discussed symptoms with patient. Encouraged patient to add Famotidine to current med regimen rather than Bentyl. Patient will follow-up in 3mos and we will see if Famotidine helped with her symptoms. Patient understood and agreed with plan.  · Hyperlipidemia     272.4/E78.5  Reviewed last labs, cont current meds and we will check labs in 3 months.  · Lymphedema     457.1/I89.0  Discussed symptoms with patient. Patient requested handicap letter due  to lymphedema and pain in BLE. Explained to pt she needs to have letter notarized before I sign the letter. Patient will follow-up in 3mos for updated labs and pap smear. Patient understood and agreed with plan.  · Genital herpes     054.10/A60.00  cont daily antiviral  · Carpal tunnel syndrome     354.0/G56.00  Discussed symptoms in bilateral hands with patient. Discussed past history of surgeries. Referred patient to hand specialist for further evaluation. Patient understood and agreed with plan.  · IBS (irritable bowel syndrome)     564.1/K58.9  discussed risks of long term bentyl. Will see if sx improve with better tx of GERD sx. pt not currently having diarrhea or constipation, bloating or pain.      Plan  · Orders  o ACO-39: Current medications updated and reviewed () - - 08/19/2020  o Mammogram breast screening 2D digital bilateral. (07217, ) - V76.12/Z12.31 - 08/19/2020  o Hand Surgeon/Consultation (HANDS) - 354.0/G56.00 - 08/19/2020  · Medications  o famotidine 20 mg oral tablet   SIG: take 1 tablet (20 mg) by oral route once daily at bedtime   DISP: (30) tablets with 1 refills  Prescribed on 08/19/2020     o Medications have been Reconciled  o Transition of Care or Provider Policy  · Instructions  o Patient advised to monitor blood pressure (B/P) at home and journal readings. Patient informed that a B/P reading at home of more than 130/80 is considered hypertension. For readings greater prou475/90 or higher patient is advised to follow up in the office with readings for management. Patient advised to limit sodium intake.  o Maintain a healthy weight. Avoid tight fitting clothes. Avoid fried, fatty foods, tomato sauce, chocolate, mint, garlic, onion, alcohol. caffeine. Eat smaller meals, dont lie down after a meal, dont smoke. Elevate the head of your bed 6-9 inches.  o Advised that cheeses and other sources of dairy fats, animal fats, fast food, and the extras (candy, pastries, pies, doughnuts  and cookies) all contain LDL raising nutrients. Advised to increase fruits, vegetables, whole grains, and to monitor portion sizes.   o Handouts were given to patient: gerd  o Patient is taking medications as prescribed and doing well.   o Patient was educated/instructed on their diagnosis, treatment and medications prior to discharge from the clinic today.  o Call the office with any concerns or questions.  o Electronically Identified Patient Education Materials Provided Electronically  · Disposition  o Call or Return if symptoms worsen or persist.  o Follow up in 1 week  o Follow up in 3 months  o Schedule a women's health/pap appt  o Care Transition  o MATTHEW Sent            Electronically Signed by: BOZENA RosasC -Author on August 20, 2020 01:22:14 PM

## 2021-05-10 NOTE — H&P
History and Physical      Patient Name: Esme Mcneil   Patient ID: [id number]   Sex: Female   YOB: 1965    Primary Care Provider: Emily LEE    Visit Date: April 13, 2020    Provider: ROSA Marrero   Location: [facility]   Location Address: [address] [place] [address], [address] [place] [address]  [id number]   Location Phone: [phone]          Chief Complaint     New Patient  Relocating to [place]       History Of Present Illness  Esme Mcneil is a 54 year old /White female who presents for evaluation and treatment of:      Presents to the office today to establish care.    She does state that she has a history of hypertension as well as coronary artery disease.  She had stents placed in her LAD.  She states that she does need a referral to cardiology for follow-up.    Patient does state that she needs refills of her Xyzal and her Singulair.  Patient does have a history of vitamin D deficiency as well as B12 deficiency.  Patient also has hypothyroidism.  She states is been approximately a year since she has had routine lab work completed.  She also states that she is needing her valcyclovir refilled.       Past Medical History  Disease Name Date Onset Notes   ADHD --  --    Alcoholism in remission --  --    Allergic rhinitis due to allergen 08/15/2017 --    Anemia --  --    Anxiety disorder 05/02/2017 --    Asthma 08/15/2017 --    Broken Bones --  --    Constipation --  --    Depression 05/02/2017 --    Essential hypertension 05/02/2017 --    Gall stones --  --    Genital herpes 08/15/2017 --    Head injury --  --    Heart Disease --  --    Heart Murmur --  --    Hemorrhoids --  --    Hypertension --  --    Kidney stones --  --    Migraine Headaches --  --    Night sweats --  --    Post Traumatic Stress Disorder --  --    Reflux Disease --  --    Seizure --  --    Sinus trouble --  --    STD exposure --  --          Past Surgical History  Procedure Name Date Notes    Adenoidectomy 1996 --    Appendectomy 1975 --    carpal tunnel --  1992 right wrist - 2003 Left hand - left hand 7-7-2016   Cesarian Section 1989 --    Cholecstectomy 2002 --    Colonoscopy --  --    Excision of uvula 1996 --    Fatty TUMOR 1993 & 2002 --    Gastric Bypass 2002 rour-en-y   Other --  tubes and overy removal 2006- removal of skin on arms 2006   Stent placment 09- --    Tonsilectomy 1996 --    Tummy tuck 2006 --          Medication List  Name Date Started Instructions   albuterol sulfate 90 mcg/actuation inhalation HFA aerosol inhaler  inhale 2 puffs by inhalation route QD as needed   aspirin 81 mg oral tablet,delayed release (DR/EC) 07/25/2018 take 1 tablet (81 mg) by oral route once daily   cyanocobalamin (vitamin B-12) 1,000 mcg/mL injection solution  inject 0.1 milliliter once a week, once every 2 weeks, then once a month   hydroxyzine HCl 50 mg oral tablet  take 1 tablet by oral route 2 times a day   montelukast 10 mg oral tablet 04/13/2020 take 1 tablet (10 mg) by oral route once daily in the evening   omeprazole 40 mg oral capsule,delayed release(DR/EC) 07/25/2018 take 1 capsule (40 mg) by oral route once daily before a meal for 30 days   Trintellix 20 mg oral tablet 08/28/2018 take 1 tablet (20 mg) by oral route once daily at the same time each day for 30 days   valacyclovir 500 mg oral tablet 04/13/2020 take 1 tablet (500 mg) by oral route once daily for 90 days   Vitamin D3 2,000 unit oral capsule 07/25/2018 take 1 capsule by oral route daily for 90 days         Allergy List  Allergen Name Date Reaction Notes   Ambien --  --  --    ampicillin --  --  --    Bananas --  --  --    Bee Stings --  --  --    Cephalexin adverse reaction --  --  --    Cockroach --  --  --    Codiene --  --  --    [place] Pollen --  --  --    Darvocet-N 100 --  --  --    Dilaudid --  --  --    Fescue Pollen --  --  --    Garlic --  --  --    Horses --  --  --    hydrocodone-acetaminophen --  --  --     ibuprofen --  --  --    Keflex --  --  --    lisinopril --  --  --    Lortab --  --  --    Neurontin --  --  --    Norvasc --  --  --    Percocet --  --  --    prednisone --  --  vomiting   Remeron --  --  --    Seafood --  --  --    Shrimp allergy --  --  --    Strawberry --  --  --    SULFA (SULFONAMIDES) --  --  --    Tessalon Perles --  --  --    Vicodin --  --  --    Wasp stings --  --  --          Family Medical History  Disease Name Relative/Age Notes   Stroke Grandmother (maternal)/  Grandmother (paternal)/   --    - No Family History of Colorectal Cancer  --    Coronary artery disease Aunt/  Brother/  Grandfather (maternal)/  Grandfather (paternal)/  Grandmother (maternal)/  Grandmother (paternal)/  Mother/  Uncle/   --    Prostate cancer Father/   --    Diabetes Brother/  Daughter/   --    Ovarian Cancer, Family History Aunt/   --          Reproductive History  Menstrual   Menopause Status: Postmenopausal   Pregnancy Summary   Total Pregnancies: 3 Full Term: 1 Premature: 0   Ab Induced: 0 Ab Spontaneous: 0 Ectopics: 0   Multiples: 0 Livin         Social History  Finding Status Start/Stop Quantity Notes   Alcohol Former --/-- --  --    Tobacco Former --/-- --  --          Immunizations  NameDate Admin Mfg Trade Name Lot Number Route Inj VIS Given VIS Publication   InfluenzaRefused 2020 NE Not Entered  NE NE     Comments:    Tkhzsknvr87/15/2017 [facility] Fluarix, quadrivalent, preservative free [id number]  RD 09/15/2017 2015   Comments: Pt tolerated injection well         Review of Systems  · Constitutional  o Denies  o : fatigue, night sweats  · Eyes  o Denies  o : double vision, blurred vision  · HENT  o Denies  o : vertigo, recent head injury  · Breasts  o Denies  o : abnormal changes in breast size, additional breast symptoms except as noted in the HPI  · Cardiovascular  o Denies  o : chest pain, irregular heart beats  · Respiratory  o Denies  o : shortness of breath, productive  "cough  · Gastrointestinal  o Denies  o : nausea, vomiting  · Genitourinary  o Denies  o : dysuria, urinary retention  · Integument  o Denies  o : hair growth change, new skin lesions  · Neurologic  o Denies  o : altered mental status, seizures  · Musculoskeletal  o Denies  o : joint swelling, limitation of motion  · Endocrine  o Denies  o : cold intolerance, heat intolerance  · Heme-Lymph  o Denies  o : petechiae, lymph node enlargement or tenderness  · Allergic-Immunologic  o Denies  o : frequent illnesses      Vitals  Date Time BP Position Site L\R Cuff Size HR RR TEMP (F) WT  HT  BMI kg/m2 BSA m2 O2 Sat        04/13/2020 10:35 /80 Sitting    82 - R 18 97.8 180lbs 0oz 5'  6\" 29.05 1.95 100 %          Physical Examination  · Constitutional  o Appearance  o : well-nourished, in no acute distress  · Neck  o Inspection/Palpation  o : normal appearance, no masses or tenderness, trachea midline  o Range of Motion  o : cervical range of motion within normal limits  o Thyroid  o : gland size normal, nontender, no nodules or masses present on palpation  · Respiratory  o Respiratory Effort  o : breathing unlabored  o Inspection of Chest  o : normal appearance  o Auscultation of Lungs  o : normal breath sounds throughout inspiration and expiration  · Cardiovascular  o Heart  o :   § Auscultation of Heart  § : regular rate and rhythm, no murmurs, gallops or rubs  o Peripheral Vascular System  o :   § Extremities  § : no clubbing or edema  · Skin and Subcutaneous Tissue  o General Inspection  o : no rashes or lesions present, no areas of discoloration  o Body Hair  o : hair normal for age, general body hair distribution normal for age  o Digits and Nails  o : no clubbing, cyanosis, deformities or edema present, normal appearing nails  · Neurologic  o Mental Status Examination  o :   § Orientation  § : grossly oriented to person, place and time  o Gait and Station  o : normal gait, able to stand without " difficulty  · Psychiatric  o Judgement and Insight  o : judgment and insight intact  o Mood and Affect  o : mood normal, affect appropriate  o Presence of Abnormal Thoughts  o : no hallucinations, no delusions present, no psychotic thoughts          Assessment  · Screening for depression     V79.0/Z13.89  · Allergic rhinitis due to allergen     477.9/J30.9  · Anxiety disorder     300.00/F41.9  · GERD (gastroesophageal reflux disease)     530.81/K21.9  · Vitamin D deficiency     268.9/E55.9  · CAD (coronary artery disease)     414.00/I25.10  · HSV-2 infection     054.9/B00.9  · Vitamin B12 deficiency     266.2/E53.8  · Medication monitoring encounter     V58.83/Z51.81  · Screening for cardiovascular condition     V81.2/Z13.6      Plan  · Orders  o Annual depression screening, 15 minutes (, 92113) - V79.0/Z13.89 - 04/13/2020  o ACO-18: Positive screen for clinical depression using a standardized tool and a follow-up plan documented () - V79.0/Z13.89 - 04/13/2020  o CBC with Auto Diff [facility] (42552) - - 04/13/2020  o CMP [facility] (37922) - - 04/13/2020  o Lipid Panel [facility] (93381) - - 04/13/2020  o Vitamin D (25-Hydroxy) Level (92701) - - 04/13/2020  o ACO-39: Current medications updated and reviewed () - - 04/13/2020  o Vitamin B12 level (35120) - - 04/13/2020  o CARDIOLOGY CONSULTATION (CARDI) - - 04/13/2020  · Medications  o Xyzal 5 mg oral tablet   SIG: take 1 tablet (5 mg) by oral route once daily at bedtime   DISP: (90) tablets with 1 refills  Prescribed on 04/13/2020     o montelukast 10 mg oral tablet   SIG: take 1 tablet (10 mg) by oral route once daily in the evening   DISP: (90) tablet with 1 refills  Refilled on 04/13/2020     o valacyclovir 500 mg oral tablet   SIG: take 1 tablet (500 mg) by oral route once daily for 90 days   DISP: (90) tablet with 0 refills  Refilled on 04/13/2020     o cetirizine 10 mg oral tablet   SIG: take 1 tablet (10 mg) by oral route once daily   DISP: (90) tablet with 0  refills  Discontinued on 04/13/2020     o cyclobenzaprine 5 mg oral tablet   SIG: take 1 tablet (5 mg) by oral route twice daily for muscle spasms   DISP: (60) tablets with 2 refills  Discontinued on 04/13/2020     o dicyclomine 10 mg oral capsule   SIG: take 2 capsules by oral route once a for 30 days   DISP: (60) capsule with 1 refills  Discontinued on 04/13/2020     o docusate sodium 100 mg oral capsule   SIG: take 3 capsules by oral route once a day (at bedtime)   DISP: (90) capsule with 0 refills  Discontinued on 04/13/2020     o epinephrine 0.3 mg/0.3 mL injection auto-injector   SIG: inject 0.3 milliliter (0.3 mg) by intramuscular route once   DISP: (0.3) Syringe with 0 refills  Discontinued on 04/13/2020     o losartan 25 mg oral tablet   SIG: take 1 tablet (25 mg) by oral route once daily   DISP: (90) tablets with 0 refills  Discontinued on 04/13/2020     o Stahist AD 25-60 mg oral tablet   SIG: take 1 tablet by oral route 2 times a day as needed   DISP: (10) tablets with 0 refills  Discontinued on 04/13/2020     o Medications have been Reconciled  o Transition of Care or Provider Policy  · Instructions  o Depression Screen completed and scanned into the EMR under the designated folder within the patient's documents.  o Today's PHQ-9 result is ___16  o Maintain a healthy weight. Avoid tight fitting clothes. Avoid fried, fatty foods, tomato sauce, chocolate, mint, garlic, onion, alcohol. caffeine. Eat smaller meals, dont lie down after a meal, dont smoke. Elevate the head of your bed 6-9 inches.  o Recheck Vitamin D blood level in 8-12 weeks.  o Patient was educated/instructed on their diagnosis, treatment and medications prior to discharge from the clinic today.  o Time spent with the patient was minutes, more than 50% face to face.  · Disposition  o Call or Return if symptoms worsen or persist.  o follow up in 6 months  o Care Transition  o MATTHEW Sent            Electronically Signed by: ROSA Marrero  -Author on April 13, 2020 01:30:23 PM

## 2021-05-13 NOTE — PROGRESS NOTES
Progress Note      Patient Name: Esme Mcneil   Patient ID: [id number]   Sex: Female   YOB: 1965    Primary Care Provider: Herbie LEE    Visit Date: September 21, 2020    Provider: Ghada Dyson PA-C   Location: [facility]   Location Address: [address] [place] [address], [address]  [place], [place]  [id number]   Location Phone: [phone]          Chief Complaint  · Annual Exam  · PAP exam  · (Health Maintainence Information Reviewed Under Results)      History Of Present Illness  Last PAP Smear: 2018.   Date of Last Mammogram: 08/05/2016.   Date of Last Colonoscopy: Cologaurd 2018   No current complaints.   Esme Mcneil is a 55 year old /White female who presents for evaluation and treatment of:      Pap  Wants g/c testing today. She was previously diagnosed with herpes.  Denies vaginal discharge, odor, lesions.    Mammogram: not utd.   She does self breast exams.   Denies lumps, nipple discharge, or concerns.    Anemia: previously told she had anemia  She has not had recent labs to check improvement    BP: taking medication daily.   Denies cp, palpitations, sob, ha.    Hot flashes: admits to hot flashes.  She states she wakes up sweating profusely.       Past Medical History  Disease Name Date Onset Notes   ADHD --  --    Alcoholism in remission --  --    Allergic rhinitis due to allergen 08/15/2017 --    Anemia --  --    Anxiety disorder 05/02/2017 --    Asthma 08/15/2017 --    Breast cancer screening 9/1/2017 --    Broken Bones --  --    Constipation --  --    Depression 05/02/2017 --    Essential hypertension 05/02/2017 --    Gall stones --  --    Genital herpes 08/15/2017 --    GERD (gastroesophageal reflux disease) 08/20/2020 Discussed symptoms with patient. Encouraged patient to add Famotidine to current med regimen rather than Bentyl. Patient will follow-up in 3mos and we will see if Famotidine helped with her symptoms. Patient understood and agreed with plan.    Head injury --  --    Heart disease --  --    Heart Murmur --  --    Hemorrhoids --  --    Hyperlipidemia 08/20/2020 --    Hypertension --  --    IBS (irritable bowel syndrome) 08/20/2020 discussed risks of long term bentyl. Will see if sx improve with better tx of GERD sx. pt not currently having diarrhea or constipation, bloating or pain.   Kidney stones --  --    Migraine Headaches --  --    Night sweats --  --    Post Traumatic Stress Disorder --  --    Reflux Disease --  --    Seizure --  --    Sinus trouble --  --    STD exposure --  --          Past Surgical History  Procedure Name Date Notes   Adenoidectomy 1996 --    Appendectomy 1975 --    carpal tunnel --  1992 right wrist - 2003 Left hand - left hand 7-7-2016   Cesarian Section 1989 --    Cholecstectomy 2002 --    Colonoscopy 2/16/2018 cologard   Excision of uvula 1996 --    Fatty TUMOR 1993 & 2002 --    Gastric Bypass 2002 rour-en-y   Other --  tubes and overy removal 2006- removal of skin on arms 2006   Stent placment 09- --    Tonsilectomy 1996 --    Tummy tuck 2006 --          Medication List  Name Date Started Instructions   albuterol sulfate 90 mcg/actuation inhalation HFA aerosol inhaler  inhale 2 puffs by inhalation route QD as needed   aspirin 81 mg oral tablet,delayed release (DR/EC) 07/25/2018 take 1 tablet (81 mg) by oral route once daily   caffeine 200 mg oral tablet  take 1 tablet by oral route daily   Crestor 5 mg oral tablet  take 1 tablet (5 mg) by oral route once daily   cyanocobalamin (vitamin B-12) 1,000 mcg/mL injection solution  inject 0.1 milliliter once a week, once every 2 weeks, then once a month   EpiPen 2-Mundo 0.3 mg/0.3 mL injection auto-injector 08/03/2020 inject 0.3 milliliter (0.3 mg) by intramuscular route once as needed for anaphylaxis   famotidine 20 mg oral tablet 08/19/2020 take 1 tablet (20 mg) by oral route once daily at bedtime   hydroxyzine HCl 50 mg oral tablet  take 3 tablets (150 mg) by oral route at  bedtime   montelukast 10 mg oral tablet 04/13/2020 take 1 tablet (10 mg) by oral route once daily in the evening   omeprazole 40 mg oral capsule,delayed release(/EC) 07/20/2020 take 1 capsule (40 mg) by oral route once daily before a meal for 30 days   Trintellix 20 mg oral tablet 07/01/2020 take 1 tablet (20 mg) by oral route once daily at the same time each day for 30 days   valacyclovir 500 mg oral tablet 05/12/2020 take 1 tablet by oral route 2 times a day for 90 days   Vitamin D3 2,000 unit oral capsule 07/25/2018 take 1 capsule by oral route daily for 90 days   Xyzal 5 mg oral tablet 04/13/2020 take 1 tablet (5 mg) by oral route once daily at bedtime         Allergy List  Allergen Name Date Reaction Notes   Adderall --  --  --    Ambien --  --  --    ampicillin --  --  --    Bananas --  --  --    Bee Stings --  --  --    Brintellix --  --  --    Cephalexin adverse reaction --  --  --    Cockroach --  --  --    Codiene --  --  --    [place] Pollen --  --  --    Cymbalta --  --  --    Darvocet-N 100 --  --  --    Dilaudid --  --  --    Effexor --  --  --    Fescue Pollen --  --  --    Garlic --  --  --    Horses --  --  --    hydrocodone-acetaminophen --  --  --    ibuprofen --  --  --    Keflex --  --  --    Lexapro --  --  --    lisinopril --  --  --    Lortab --  --  --    Neurontin --  --  --    Norvasc --  --  --    Paxil --  --  --    Percocet --  --  --    prednisone --  --  vomiting   Prozac --  --  --    Remeron --  --  --    Seafood --  --  --    Seroquel --  --  --    Serzone --  --  --    Shrimp allergy --  --  --    Strawberry --  --  --    SULFA (SULFONAMIDES) --  --  --    Tessalon Perles --  --  --    trazodone --  --  --    Vicodin --  --  --    Wasp stings --  --  --    Wellbutrin --  --  --    Zyprexa --  --  --        Allergies Reconciled  Family Medical History  Disease Name Relative/Age Notes   Stroke Grandmother (maternal)/  Grandmother (paternal)/   --    - No Family History of  Colorectal Cancer  --    Coronary artery disease Aunt/  Brother/  Grandfather (maternal)/  Grandfather (paternal)/  Grandmother (maternal)/  Grandmother (paternal)/  Mother/  Uncle/   --    Prostate cancer Father/   --    Diabetes Brother/  Daughter/   --    Ovarian Cancer, Family History Aunt/   --          Reproductive History  Menstrual   Menopause Status: Postmenopausal   Pregnancy Summary   Total Pregnancies: 3 Full Term: 1 Premature: 0   Ab Induced: 0 Ab Spontaneous: 0 Ectopics: 0   Multiples: 0 Livin         Social History  Finding Status Start/Stop Quantity Notes   Alcohol Former --/-- --  --    Tobacco Former --/-- --  --          Immunizations  NameDate Admin Mfg Trade Name Lot Number Route Inj VIS Given VIS Publication   InfluenzaRefused 2020 NE Not Entered  NE NE     Comments:    Hohgztbcs32/15/2017 SKB Fluarix, quadrivalent, preservative free [id number] IM RD 09/15/2017 2015   Comments: Pt tolerated injection well   Ufryfjjqw0461/13/2019 NE Not Entered  NE NE 2020    Comments:    Tdap2019 NE Not Entered  NE NE 2020    Comments:          Review of Systems  · Constitutional  o Denies  o : appetite change, fatigue, night sweats, weight gain, weight loss  · HENT  o Denies  o : hearing loss, tinnitus, vertigo, nasal discharge, nose bleeding, dental problems, oral lesions, sore throat  · Breasts  o Denies  o : lumps, tenderness, swelling, nipple discharge  · Cardiovascular  o Denies  o : chest pain, decreased exercise tolerance, dyspnea on exertion, palpitations  · Respiratory  o Denies  o : cough, shortness of breath, wheezing, snoring, apneas  · Gastrointestinal  o Denies  o : abdominal pain, nausea, vomiting, dysphagia, heartburn, changes in bowel habits, constipation, diarrhea  · Genitourinary  o Denies  o : dysuria, hematuria, incontinence, nocturia, frequency, urgency, sexual dysfunction  · Neurologic  o Denies  o : abnormal gait, facial weakness, headache, memory  "difficulties, tingling or numbness, seizures, tremors  · Psychiatric  o Denies  o : anxiety, decreased concentration, irritability, panic attacks, sleep distrubances, sadness/tearfulness      Vitals  Date Time BP Position Site L\R Cuff Size HR RR TEMP (F) WT  HT  BMI kg/m2 BSA m2 O2 Sat HC       07/13/2020 10:04 /66 Sitting    78 - R   179lbs 0oz 5'  6.5\" 28.46 1.95     08/19/2020 02:50 /100 Sitting    73 - R 15 98.8 184lbs 16oz 5'  6\" 29.86 1.98 98 %    09/21/2020 03:01 /90 Sitting    70 - R  97.6 189lbs 8oz 5'  6.5\" 30.13 2.01 98 %          Physical Examination  · Constitutional  o Appearance  o : well-nourished, in no acute distress  · Neck  o Inspection/Palpation  o : normal appearance, no masses or tenderness, trachea midline  o Range of Motion  o : cervical range of motion within normal limits  o Thyroid  o : gland size normal, nontender, no nodules or masses present on palpation  · Respiratory  o Respiratory Effort  o : breathing unlabored  o Inspection of Chest  o : normal appearance  o Auscultation of Lungs  o : normal breath sounds throughout  · Cardiovascular  o Heart  o :   § Auscultation of Heart  § : regular rate and rhythm, no murmurs, gallops or rubs  o Peripheral Vascular System  o :   § Carotid Arteries  § : normal pulses bilaterally, no bruits present  § Pedal Pulses  § : pulses 2 bilaterally  § Extremities  § : no edema  · Breasts  o Inspection of Breasts  o : breasts symmetrical, no skin changes, no deformities present, no discharge present  o Palpation of Breasts, Axillae  o : no masses present on palpation, no breast tenderness  · Gastrointestinal  o Abdominal Examination  o : abdomen nontender to palpation, tone normal without rigidity or guarding, no masses present, normal bowel sounds  · Genitourinary  o External Genitalia  o : no inflammation, no lesions present  o Vagina  o : normal vaginal vault, no discharge present, no inflammatory lesions present, no masses " present  o Urethra  o :   § Urethral Meatus  § : no inflammation present  o Bladder  o : nontender to palpation  o Cervix  o : appearance healthy, no lesions present, nontender to palpation, no discharges, no bleeding present, normal midline position  o Uterus  o : nontender to palpation, no masses present, position midline/midplane  o Adnexa  o : no tenderness or masses present on bimanual examination  o Anus  o : no inflammation or lesions present  o Perineum  o : perineum within normal limits  · Lymphatic  o Neck  o : no lymphadenopathy present  o Axilla  o : no lymphadenopathy present  o Groin  o : no lymphadenopathy present  · Neurologic  o Mental Status Examination  o :   § Orientation  § : grossly oriented to person, place and time  o Gait and Station  o : normal gait, able to stand without difficulty  · Psychiatric  o Judgment and Insight  o : judgment and insight intact  o Mood and Affect  o : mood normal, affect appropriate          Assessment  · Routine gynecological examination     V72.31/Z01.419  Pap sent today  · Pap smear, as part of routine gynecological examination     V76.2/Z01.419  · Screening Mammogram     V76.10/Z12.39  · Anemia     285.9/D64.9  will check labs today to eval previous anemia.  · Essential hypertension     401.9/I10  BP improved from last visit. Discussed bp elevation at today's visit. Adding clonidine for hot flashes and help with bp. Discussed risks of bp elevation including death, mi, stroke, ckd, blindness. Low salt diet, increase exercise. Discussed importance of medication compliance and not missing doses. We will monitor at follow up and adjust bp medications if necessary. To er if cp, palpitations, vision loss, unilateral weakness, altered mental status. Pt understands and agrees with plan.  · Genital herpes     054.10/A60.00  No active lesions on exam  · Hot flashes     782.62/R23.2  discussed hot flashes and tx options. Will start clonidine to help with bp and hot  flashes. Pt will monitor bp at home with new medication.    Problems Reconciled  Plan  · Orders  o Vaginal Screen (Candida, Gardnerella & Trichomonas) (94665) - V72.31/Z01.419 - 09/21/2020  o Chlamydia/GC by PCR (Urine or Vaginal Swab) [facility] ([id number]) - V72.31/Z01.419 - 09/21/2020  o Pap smear (45196) - V76.2/Z01.419 - 09/21/2020  o Screening Mammogram 2D Bilateral (82680, ) - V76.10/Z12.39 - 09/23/2020  o CBC with Auto Diff [facility] ([id number]) - 285.9/D64.9, 401.9/I10 - 09/21/2020  o CMP [facility] (76349) - 285.9/D64.9, 401.9/I10 - 09/21/2020  o ACO-39: Current medications updated and reviewed () - - 09/21/2020  · Medications  o clonidine HCl 0.1 mg oral tablet   SIG: take 1 tablet by oral route daily at bedtime for hot flashes and blood pressure   DISP: (30) tablets with 1 refills  Prescribed on 09/23/2020     o Medications have been Reconciled  o Transition of Care or Provider Policy  · Instructions  o **Pap Test/Liquid Based:   o Source:  o ********  o **Perform Reflex Human Papilloma Virus (HPV) High Risk on this Pap (If atypical squamous cells of the undetermined signifigcance (ASCUS)/Atypical Glandular Cells of undetermined significance (AGCUS): Low Grade Squamous Intraepitheal lesion (LGSIL): **  o No  o ********  o Medicare:  o **Is this an annual PAP:  o Yes  o Counseled on monthly breast self exams.   o Counseled on STD prevention.  o Counseled on diet and exercise.   o Counseled on weight-bearing exercise.  o Recommended Calcium with Vitamin D twice daily.  o Patient was educated/instructed on their diagnosis, treatment and medications prior to discharge from the clinic today.  · Disposition  o Call or Return if symptoms worsen or persist.  o Follow up in 1 month  o Labs drawn in office today            Electronically Signed by: Ghada Dyson PA-C -Author on September 23, 2020 06:08:43 PM

## 2021-05-13 NOTE — PROGRESS NOTES
Progress Note      Patient Name: Esme Mcneil   Patient ID: [id number]   Sex: Female   YOB: 1965    Primary Care Provider: Herbie LEE    Visit Date: September 25, 2020    Provider: Ghada Dyson PA-C   Location: [facility]   Location Address: [address] [place] [address], [address]  [place], [place]  [id number]   Location Phone: [phone]          Chief Complaint  · medication concerns       History Of Present Illness  Esme Mcneil is a 55 year old /White female who presents for evaluation and treatment of:      follow up    BP: denies cp, palpitations, sob, dizziness, ha    hot flashes: she has not picked up clonidine yet.    depression: mood well controlled on current medications, needs refills today  denies si/hi    gerd: sx controlled on current medications, needs refills    allergies: sx controlled on current meds, needs refills    genital herpes: had an outbreak last month.   has been taking daily preventive x yrs. if she does not take it daily then she has numerous outbreaks that are painful       Past Medical History  Disease Name Date Onset Notes   ADHD --  --    Alcoholism in remission --  --    Allergic rhinitis due to allergen 08/15/2017 --    Anemia --  --    Anxiety disorder 05/02/2017 --    Asthma 08/15/2017 --    Breast cancer screening 9/1/2017 --    Broken Bones --  --    Constipation --  --    Depression 05/02/2017 --    Essential hypertension 05/02/2017 --    Gall stones --  --    Genital herpes 08/15/2017 --    GERD (gastroesophageal reflux disease) 08/20/2020 Discussed symptoms with patient. Encouraged patient to add Famotidine to current med regimen rather than Bentyl. Patient will follow-up in 3mos and we will see if Famotidine helped with her symptoms. Patient understood and agreed with plan.   Head injury --  --    Heart disease --  --    Heart Murmur --  --    Hemorrhoids --  --    Hyperlipidemia 08/20/2020 --    Hypertension --  --    IBS  (irritable bowel syndrome) 08/20/2020 discussed risks of long term bentyl. Will see if sx improve with better tx of GERD sx. pt not currently having diarrhea or constipation, bloating or pain.   Kidney stones --  --    Migraine Headaches --  --    Night sweats --  --    Post Traumatic Stress Disorder --  --    Reflux Disease --  --    Seizure --  --    Sinus trouble --  --    STD exposure --  --          Past Surgical History  Procedure Name Date Notes   Adenoidectomy 1996 --    Appendectomy 1975 --    carpal tunnel --  1992 right wrist - 2003 Left hand - left hand 7-7-2016   Cesarian Section 1989 --    Cholecstectomy 2002 --    Colonoscopy 2/16/2018 cologard   Excision of uvula 1996 --    Fatty TUMOR 1993 & 2002 --    Gastric Bypass 2002 rour-en-y   Other --  tubes and overy removal 2006- removal of skin on arms 2006   Stent placment 09- --    Tonsilectomy 1996 --    Tummy tuck 2006 --          Medication List  Name Date Started Instructions   albuterol sulfate 90 mcg/actuation inhalation HFA aerosol inhaler  inhale 2 puffs by inhalation route QD as needed   aspirin 81 mg oral tablet,delayed release (DR/EC) 07/25/2018 take 1 tablet (81 mg) by oral route once daily   caffeine 200 mg oral tablet  take 1 tablet by oral route daily   clonidine HCl 0.1 mg oral tablet 09/25/2020 TAKE 1 TABLET BY MOUTH EVERY NIGHT AT BEDTIME AS NEEDED FOR HOT FLASHES AND BLOOD PRESSURE   Crestor 5 mg oral tablet  take 1 tablet (5 mg) by oral route once daily   cyanocobalamin (vitamin B-12) 1,000 mcg/mL injection solution  inject 0.1 milliliter once a week, once every 2 weeks, then once a month   EpiPen 2-Mundo 0.3 mg/0.3 mL injection auto-injector 08/03/2020 inject 0.3 milliliter (0.3 mg) by intramuscular route once as needed for anaphylaxis   famotidine 20 mg oral tablet 09/25/2020 take 1 tablet (20 mg) by oral route once daily at bedtime   hydroxyzine HCl 50 mg oral tablet  take 1 tablet (50 mg) by oral route 3 times a day.    montelukast 10 mg oral tablet 09/25/2020 take 1 tablet by oral route daily for allergies   omeprazole 40 mg oral capsule,delayed release(DR/EC) 09/25/2020 take 1 capsule by oral route daily before meal for reflux   Trintellix 20 mg oral tablet 09/25/2020 take 1 tablet by oral route daily for 90 days for mood   valacyclovir 1 gram oral tablet 09/25/2020 take 1 tablet (1,000 mg) by oral route once daily for 90 days   Vitamin C 500 mg oral tablet  take 1 tablet by oral route daily   Vitamin D3 2,000 unit oral capsule 07/25/2018 take 1 capsule by oral route daily for 90 days   Xyzal 5 mg oral tablet 09/25/2020 take 1 tablet by oral route daily for 90 days at bedtime for allergies         Allergy List  Allergen Name Date Reaction Notes   Adderall --  --  --    Ambien --  --  --    ampicillin --  --  --    Bananas --  --  --    Bee Stings --  --  --    Brintellix --  --  --    Cephalexin adverse reaction --  --  --    Cockroach --  --  --    Codiene --  --  --    [place]ath Pollen --  --  --    Cymbalta --  --  --    Darvocet-N 100 --  --  --    Dilaudid --  --  --    Effexor --  --  --    Fescue Pollen --  --  --    Garlic --  --  --    Horses --  --  --    hydrocodone-acetaminophen --  --  --    ibuprofen --  --  --    Keflex --  --  --    Lexapro --  --  --    lisinopril --  --  --    Lortab --  --  --    Neurontin --  --  --    Norvasc --  --  --    Paxil --  --  --    Percocet --  --  --    prednisone --  --  vomiting   Prozac --  --  --    Remeron --  --  --    Seafood --  --  --    Seroquel --  --  --    Serzone --  --  --    Shrimp allergy --  --  --    Strawberry --  --  --    SULFA (SULFONAMIDES) --  --  --    Tessalon Perles --  --  --    trazodone --  --  --    Vicodin --  --  --    Wasp stings --  --  --    Wellbutrin --  --  --    Zyprexa --  --  --        Allergies Reconciled  Family Medical History  Disease Name Relative/Age Notes   Stroke Grandmother (maternal)/  Grandmother (paternal)/   --    - No  Family History of Colorectal Cancer  --    Coronary artery disease Aunt/  Brother/  Grandfather (maternal)/  Grandfather (paternal)/  Grandmother (maternal)/  Grandmother (paternal)/  Mother/  Uncle/   --    Prostate cancer Father/   --    Diabetes Brother/  Daughter/   --    Ovarian Cancer, Family History Aunt/   --          Reproductive History  Menstrual   Menopause Status: Postmenopausal   Pregnancy Summary   Total Pregnancies: 3 Full Term: 1 Premature: 0   Ab Induced: 0 Ab Spontaneous: 0 Ectopics: 0   Multiples: 0 Livin         Social History  Finding Status Start/Stop Quantity Notes   Alcohol Former --/-- --  --    Tobacco Former --/-- --  --          Immunizations  NameDate Admin Mfg Trade Name Lot Number Route Inj VIS Given VIS Publication   InfluenzaRefused 2020 NE Not Entered  NE NE     Comments:    Uhmjzxwqx39/15/2017 SKB Fluarix, quadrivalent, preservative free [id number] IM RD 09/15/2017 2015   Comments: Pt tolerated injection well   Xxqqshyin0355/13/2019 NE Not Entered  NE NE 2020    Comments:    Tdap2019 NE Not Entered  NE NE 2020    Comments:          Review of Systems  · Constitutional  o Denies  o : fatigue, fever, weight gain, weight loss, chills  · Eyes  o Denies  o : changes in vision  · HENT  o Denies  o : ear pain, sore throat  · Cardiovascular  o Denies  o : chest Pain, palpitations, edema (swelling)  · Respiratory  o Denies  o : frequent cough, shortness of breath  · Gastrointestinal  o Denies  o : nausea, vomiting, changes in bowel habits  · Genitourinary  o Denies  o : dysuria, urinary frequency, urinary urgency, polyuria  · Integument  o Denies  o : rash  · Neurologic  o Denies  o : headache, tingling or numbness, dizziness  · Musculoskeletal  o Denies  o : joint pain, myalgias  · Endocrine  o Denies  o : polydipsia, polyphagia  · Psychiatric  o Denies  o : mood changes, memory changes, SI/HI  · Heme-Lymph  o Denies  o : easy bruising, easy bleeding, lymph  "node enlargement or tenderness  · Allergic-Immunologic  o Denies  o : eczema, seasonal allergies, urticaria      Vitals  Date Time BP Position Site L\R Cuff Size HR RR TEMP (F) WT  HT  BMI kg/m2 BSA m2 O2 Sat HC       08/19/2020 02:50 /100 Sitting    73 - R 15 98.8 184lbs 16oz 5'  6\" 29.86 1.98 98 %    09/21/2020 03:01 /90 Sitting    70 - R  97.6 189lbs 8oz 5'  6.5\" 30.13 2.01 98 %    09/25/2020 09:12 /84 Sitting    92 - R 15 98.1 184lbs 2oz    99 %          Physical Examination  · Constitutional  o Appearance  o : no acute distress, well-nourished  · Head and Face  o Head  o :   § Inspection  § : atraumatic, normocephalic  · Eyes  o Eyes  o : extraocular movements intact, no scleral icterus, no conjunctival injection  · Ears, Nose, Mouth and Throat  o Ears  o :   § External Ears  § : normal  o Nose  o :   § Intranasal Exam  § : nares patent  o Oral Cavity  o :   § Oral Mucosa  § : moist mucous membranes  · Respiratory  o Respiratory Effort  o : breathing comfortably, symmetric chest rise  o Auscultation of Lungs  o : clear to asculatation bilaterally, no wheezes, rales, or rhonchii  · Cardiovascular  o Heart  o :   § Auscultation of Heart  § : regular rate and rhythm, no murmurs, rubs, or gallops  o Peripheral Vascular System  o :   § Extremities  § : no edema  · Neurologic  o Mental Status Examination  o :   § Orientation  § : grossly oriented to person, place and time  o Gait and Station  o :   § Gait Screening  § : normal gait  · Psychiatric  o General  o : normal mood and affect              Assessment  · Allergic rhinitis due to allergen     477.9/J30.9  · Depression     311/F32.9  · Essential hypertension     401.9/I10  Adding clonidine for hot flashes should help improve bp control. Discussed bp elevation at today's visit. Discussed risks of bp elevation including death, mi, stroke, ckd, blindness. Low salt diet, increase exercise. Discussed importance of medication compliance and not " missing doses. We will monitor at follow up and adjust bp medications if necessary. To er if cp, palpitations, vision loss, unilateral weakness, altered mental status. Pt understands and agrees with plan.  · GERD (gastroesophageal reflux disease)     530.81/K21.9  · Genital herpes     054.10/A60.00  cont daily antiviral for prevention       Plan  · Orders  o ACO-39: Current medications updated and reviewed () - - 09/25/2020  · Medications  o clonidine HCl 0.1 mg oral tablet   SIG: TAKE 1 TABLET BY MOUTH EVERY NIGHT AT BEDTIME AS NEEDED FOR HOT FLASHES AND BLOOD PRESSURE   DISP: (90) Tablet with 0 refills  Adjusted on 09/25/2020     o famotidine 20 mg oral tablet   SIG: take 1 tablet (20 mg) by oral route once daily at bedtime   DISP: (90) tablets with 1 refills  Adjusted on 09/25/2020     o montelukast 10 mg oral tablet   SIG: take 1 tablet by oral route daily for allergies   DISP: (90) tablet with 1 refills  Adjusted on 09/25/2020     o omeprazole 40 mg oral capsule,delayed release(DR/EC)   SIG: take 1 capsule by oral route daily before meal for reflux   DISP: (90) capsule with 1 refills  Adjusted on 09/25/2020     o Trintellix 20 mg oral tablet   SIG: take 1 tablet by oral route daily for 90 days for mood   DISP: (90) tablets with 1 refills  Adjusted on 09/25/2020     o valacyclovir 1 gram oral tablet   SIG: take 1 tablet (1,000 mg) by oral route once daily for 90 days   DISP: (90) tablets with 1 refills  Adjusted on 09/25/2020     o Xyzal 5 mg oral tablet   SIG: take 1 tablet by oral route daily for 90 days at bedtime for allergies   DISP: (90) tablets with 1 refills  Adjusted on 09/25/2020     o Medications have been Reconciled  o Transition of Care or Provider Policy  · Instructions  o Patient was educated/instructed on their diagnosis, treatment and medications prior to discharge from the clinic today.  · Disposition  o Call or Return if symptoms worsen or persist.  o Follow up in 3  months            Electronically Signed by: Ghada Dyson PA-C -Author on September 26, 2020 09:16:33 AM

## 2021-05-13 NOTE — PROGRESS NOTES
"   Progress Note      Patient Name: Esme Mcneil   Patient ID: [id number]   Sex: Female   YOB: 1965    Primary Care Provider: Herbie LEE    Visit Date: 2020    Provider: Sandrita Jovel PA-C   Location: [facility]   Location Address: [address] [place] [address], [address]  [place], [place]  [id number]   Location Phone: [phone]          Chief Complaint  · Follow-Up  · Possible sinus infection      History Of Present Illness  Esme Mcneil is a 55 year old /White female who presents for evaluation and treatment of:      Increased stress, anxiety.  Patient states that her mother recently  and she has been in charge of her estate.  She has had a lot of legal issues with this and has had worsening stress.  She states that she is having trouble sleeping because of this.  She states that trazodone, melatonin, hydroxyzine does not help.  She states that she has been prescribed Klonopin in the past which has helped.     IBS-patient states that her stomach is always \"in knots \".  She states that this has gotten worse with her added stress recently.  She has tried certain medications such as omeprazole without much improvement.       Past Medical History  Disease Name Date Onset Notes   ADHD --  --    Alcoholism in remission --  --    Allergic rhinitis due to allergen 08/15/2017 --    Anemia --  --    Anxiety disorder 2017 --    Asthma 08/15/2017 --    Breast cancer screening 2017 --    Broken Bones --  --    Constipation --  --    Depression 2017 --    Essential hypertension 2017 --    Gall stones --  --    Genital herpes 08/15/2017 --    GERD (gastroesophageal reflux disease) 2020 Discussed symptoms with patient. Encouraged patient to add Famotidine to current med regimen rather than Bentyl. Patient will follow-up in 3mos and we will see if Famotidine helped with her symptoms. Patient understood and agreed with plan.   Head injury --  --  "   Heart disease --  --    Heart Murmur --  --    Hemorrhoids --  --    Hyperlipidemia 08/20/2020 --    Hypertension --  --    IBS (irritable bowel syndrome) 08/20/2020 discussed risks of long term bentyl. Will see if sx improve with better tx of GERD sx. pt not currently having diarrhea or constipation, bloating or pain.   Kidney stones --  --    Migraine Headaches --  --    Night sweats --  --    Post Traumatic Stress Disorder --  --    Reflux Disease --  --    Seizure --  --    Sinus trouble --  --    STD exposure --  --          Past Surgical History  Procedure Name Date Notes   Adenoidectomy 1996 --    Appendectomy 1975 --    carpal tunnel --  1992 right wrist - 2003 Left hand - left hand 7-7-2016   Cesarian Section 1989 --    Cholecstectomy 2002 --    Colonoscopy 2/16/2018 cologard   Excision of uvula 1996 --    Fatty TUMOR 1993 & 2002 --    Gastric Bypass 2002 rour-en-y   Other --  tubes and overy removal 2006- removal of skin on arms 2006   Stent placment 09- --    Tonsilectomy 1996 --    Tummy tuck 2006 --          Medication List  Name Date Started Instructions   albuterol sulfate 90 mcg/actuation inhalation HFA aerosol inhaler  inhale 2 puffs by inhalation route QD as needed   aspirin 81 mg oral tablet,delayed release (DR/EC) 07/25/2018 take 1 tablet (81 mg) by oral route once daily   caffeine 200 mg oral tablet  take 1 tablet by oral route daily   clonidine HCl 0.1 mg oral tablet 09/25/2020 TAKE 1 TABLET BY MOUTH EVERY NIGHT AT BEDTIME AS NEEDED FOR HOT FLASHES AND BLOOD PRESSURE   Crestor 5 mg oral tablet  take 1 tablet (5 mg) by oral route once daily   cyanocobalamin (vitamin B-12) 1,000 mcg/mL injection solution  inject 0.1 milliliter once a week, once every 2 weeks, then once a month   EpiPen 2-Mundo 0.3 mg/0.3 mL injection auto-injector 08/03/2020 inject 0.3 milliliter (0.3 mg) by intramuscular route once as needed for anaphylaxis   famotidine 20 mg oral tablet 09/25/2020 take 1 tablet (20 mg)  by oral route once daily at bedtime   hydroxyzine HCl 50 mg oral tablet 10/06/2020 take 1 tablet (50 mg) by oral route 3 times a day.   montelukast 10 mg oral tablet 09/25/2020 take 1 tablet by oral route daily for allergies   omeprazole 40 mg oral capsule,delayed release(/EC) 09/25/2020 take 1 capsule by oral route daily before meal for reflux   Trintellix 20 mg oral tablet 10/02/2020 take 1 tablet by oral route daily for 90 days for mood   valacyclovir 1 gram oral tablet 09/25/2020 take 1 tablet (1,000 mg) by oral route once daily for 90 days   Vitamin C 500 mg oral tablet  take 1 tablet by oral route daily   Vitamin D3 2,000 unit oral capsule 07/25/2018 take 1 capsule by oral route daily for 90 days   Xyzal 5 mg oral tablet 09/25/2020 take 1 tablet by oral route daily for 90 days at bedtime for allergies         Allergy List  Allergen Name Date Reaction Notes   Adderall --  --  --    Ambien --  --  --    ampicillin --  --  --    Bananas --  --  --    Bee Stings --  --  --    Brintellix --  --  --    Cephalexin adverse reaction --  --  --    Cockroach --  --  --    Codiene --  --  --    [place] Pollen --  --  --    Cymbalta --  --  --    Darvocet-N 100 --  --  --    Dilaudid --  --  --    Effexor --  --  --    Fescue Pollen --  --  --    Garlic --  --  --    Horses --  --  --    hydrocodone-acetaminophen --  --  --    ibuprofen --  --  --    Keflex --  --  --    Lexapro --  --  --    lisinopril --  --  --    Lortab --  --  --    Neurontin --  --  --    Norvasc --  --  --    Paxil --  --  --    Percocet --  --  --    prednisone --  --  vomiting   Prozac --  --  --    Remeron --  --  --    Seafood --  --  --    Seroquel --  --  --    Serzone --  --  --    Shrimp allergy --  --  --    Strawberry --  --  --    SULFA (SULFONAMIDES) --  --  --    Tessalon Perles --  --  --    trazodone --  --  --    Vicodin --  --  --    Wasp stings --  --  --    Wellbutrin --  --  --    Zyprexa --  --  --          Family Medical  "History  Disease Name Relative/Age Notes   Stroke Grandmother (maternal)/  Grandmother (paternal)/   --    - No Family History of Colorectal Cancer  --    Coronary artery disease Aunt/  Brother/  Grandfather (maternal)/  Grandfather (paternal)/  Grandmother (maternal)/  Grandmother (paternal)/  Mother/  Uncle/   --    Prostate cancer Father/   --    Diabetes Brother/  Daughter/   --    Ovarian Cancer, Family History Aunt/   --          Reproductive History  Menstrual   Menopause Status: Postmenopausal   Pregnancy Summary   Total Pregnancies: 3 Full Term: 1 Premature: 0   Ab Induced: 0 Ab Spontaneous: 0 Ectopics: 0   Multiples: 0 Livin         Social History  Finding Status Start/Stop Quantity Notes   Alcohol Former --/-- --  --    Tobacco Former --/-- --  --          Immunizations  NameDate Admin Mfg Trade Name Lot Number Route Inj VIS Given VIS Publication   InfluenzaRefused 2020 NE Not Entered  NE NE     Comments:    [id number] NE Not Entered  NE NE 2020    Comments:    Tdap08 NE Not Entered  NE NE 2020    Comments:          Review of Systems  · Constitutional  o Denies  o : fever, fatigue, weight loss, weight gain  · Cardiovascular  o Denies  o : lower extremity edema, claudication, chest pressure, palpitations  · Respiratory  o Denies  o : shortness of breath, wheezing, cough, hemoptysis, dyspnea on exertion  · Gastrointestinal  o Denies  o : nausea, vomiting, diarrhea, constipation, abdominal pain      Vitals  Date Time BP Position Site L\R Cuff Size HR RR TEMP (F) WT  HT  BMI kg/m2 BSA m2 O2 Sat FR L/min FiO2 HC       2020 03:01 /90 Sitting    70 - R  97.6 189lbs 8oz 5'  6.5\" 30.13 2.01 98 %  21%    2020 09:12 /84 Sitting    92 - R 15 98.1 184lbs 2oz    99 %      2020 02:19 /96 Sitting    89 - R  97 181lbs 7oz 5'  6.5\" 28.85 1.97 98 %  21%          Physical Examination  · Constitutional  o Appearance  o : no acute distress, " well-nourished  · Head and Face  o Head  o :   § Inspection  § : atraumatic, normocephalic  · Ears, Nose, Mouth and Throat  o Ears  o :   § External Ears  § : normal  o Nose  o :   § Intranasal Exam  § : nares patent  o Oral Cavity  o :   § Oral Mucosa  § : moist mucous membranes  · Respiratory  o Respiratory Effort  o : breathing comfortably, symmetric chest rise  o Auscultation of Lungs  o : clear to asculatation bilaterally, no wheezes, rales, or rhonchii  · Cardiovascular  o Heart  o :   § Auscultation of Heart  § : regular rate and rhythm, no murmurs, rubs, or gallops  o Peripheral Vascular System  o :   § Extremities  § : no edema  · Gastrointestinal  o Abdominal Examination  o : Hyperactive bowel sounds, nontender  · Skin and Subcutaneous Tissue  o General Inspection  o : no lesions present, no areas of discoloration, skin turgor normal  · Neurologic  o Mental Status Examination  o :   § Orientation  § : grossly oriented to person, place and time  o Gait and Station  o :   § Gait Screening  § : normal gait          Assessment  · Anxiety disorder     300.00/F41.9  Worsening due to legal strains of mother's death. Recommend psychiatry referral due to failure of many psych drugs in the past. Patient denies SI/HI at this time. She does not want to be inpatient for any type of treatment at this time.  · Depression     311/F32.9  · Essential hypertension     401.9/I10  Slightly elevated today, continue current medications at this time and continue to monitor blood pressure. Will have patient follow-up in 4-6 weeks, sooner if needed.  · GERD (gastroesophageal reflux disease)     530.81/K21.9  · IBS (irritable bowel syndrome)     564.1/K58.9  Will do a trial of IBgard. Would consider possible other medications but hesitant due to allergy list.    Problems Reconciled  Plan  · Orders  o ACO-39: Current medications updated and reviewed (1159F, ) - - 12/08/2020  o Physical, Primary Care Panel (CBC, CMP, Lipid, TSH)  [facility] ([id number], [id number], [id number], [id number]) - 401.9/I10, 530.81/K21.9 - 01/19/2021  o PSYCHIATRY CONSULTATION (PSYCH) - 311/F32.9 - 12/08/2020  · Medications  o IBgard 90 mg oral capsule,delayed,extend.release   SIG: Take 1 tablet (90mg) PO 30 minutes before meals up to 3 times daily   DISP: (90) Capsule with 2 refills  Prescribed on 12/08/2020     o Medications have been Reconciled  o Transition of Care or Provider Policy  · Instructions  o Discussed the need for therapy, either with a certified counselor, psychologist, and/or family . If no improvement is noted or worsening of their condition, return to office or ER. But also discussed with patient that if they are non-responsive to the type of medication they may need to see a psychiatrist for further evaluation and management.  o Patient advised to monitor blood pressure (B/P) at home and journal readings. Patient informed that a B/P reading at home of more than 130/80 is considered hypertension. For readings greater pius037/90 or higher patient is advised to follow up in the office with readings for management. Patient advised to limit sodium intake.  o Maintain a healthy weight. Avoid tight fitting clothes. Avoid fried, fatty foods, tomato sauce, chocolate, mint, garlic, onion, alcohol. caffeine. Eat smaller meals, dont lie down after a meal, dont smoke. Elevate the head of your bed 6-9 inches.  o Take all medications as prescribed/directed.  o Patient was educated/instructed on their diagnosis, treatment and medications prior to discharge from the clinic today.  o Patient instructed to seek medical attention urgently for new or worsening symptoms.  o Call the office with any concerns or questions.  · Disposition  o Call or Return if symptoms worsen or persist.  o follow up in 6 weeks  o Medications sent electronically to pharmacy  o follow up with Ghada Dyson PA-C            Electronically Signed by: Sandrita Jovel PA-C -Author on December 8, 2020 05:15:22  PM

## 2021-05-14 VITALS
RESPIRATION RATE: 15 BRPM | SYSTOLIC BLOOD PRESSURE: 148 MMHG | HEART RATE: 92 BPM | WEIGHT: 184.12 LBS | TEMPERATURE: 98.1 F | OXYGEN SATURATION: 99 % | DIASTOLIC BLOOD PRESSURE: 84 MMHG

## 2021-05-14 VITALS
DIASTOLIC BLOOD PRESSURE: 100 MMHG | WEIGHT: 185 LBS | TEMPERATURE: 98.8 F | RESPIRATION RATE: 15 BRPM | OXYGEN SATURATION: 98 % | SYSTOLIC BLOOD PRESSURE: 164 MMHG | HEART RATE: 73 BPM | HEIGHT: 66 IN | BODY MASS INDEX: 29.73 KG/M2

## 2021-05-14 VITALS
HEIGHT: 66 IN | DIASTOLIC BLOOD PRESSURE: 96 MMHG | SYSTOLIC BLOOD PRESSURE: 156 MMHG | TEMPERATURE: 97 F | HEART RATE: 89 BPM | WEIGHT: 181.44 LBS | BODY MASS INDEX: 29.16 KG/M2 | OXYGEN SATURATION: 98 %

## 2021-05-14 VITALS
HEART RATE: 78 BPM | OXYGEN SATURATION: 100 % | TEMPERATURE: 98.6 F | DIASTOLIC BLOOD PRESSURE: 4 MMHG | WEIGHT: 181.25 LBS | HEIGHT: 66 IN | SYSTOLIC BLOOD PRESSURE: 181 MMHG | BODY MASS INDEX: 29.13 KG/M2

## 2021-05-14 VITALS
HEART RATE: 70 BPM | SYSTOLIC BLOOD PRESSURE: 138 MMHG | HEIGHT: 66 IN | OXYGEN SATURATION: 98 % | TEMPERATURE: 97.6 F | DIASTOLIC BLOOD PRESSURE: 90 MMHG | BODY MASS INDEX: 30.46 KG/M2 | WEIGHT: 189.5 LBS

## 2021-05-14 VITALS
WEIGHT: 191 LBS | TEMPERATURE: 98 F | OXYGEN SATURATION: 98 % | BODY MASS INDEX: 30.7 KG/M2 | SYSTOLIC BLOOD PRESSURE: 162 MMHG | DIASTOLIC BLOOD PRESSURE: 84 MMHG | HEART RATE: 84 BPM | HEIGHT: 66 IN

## 2021-05-14 NOTE — PROGRESS NOTES
Progress Note      Patient Name: Esme Mcneil   Patient ID: [id number]   Sex: Female   YOB: 1965    Primary Care Provider: Priscilla LEE   Referring Provider: Priscilla LEE    Visit Date: April 2, 2021    Provider: ROSA Crespo   Location: [facility] [place]   Location Address: [address] [place] [address], [address] [place] [address]  [id number]   Location Phone: [phone]          Chief Complaint  · Ear pain  · sinus pain/pressure      History Of Present Illness  Esme Mcneil is a 55 year old /White female who presents for evaluation and treatment of:      She is here for an acute visit today complaining of bilateral ear pain and sinus pain and pressure.  She states her symptoms started about 1-1/2 weeks ago.  She denies sore throat, fever or chills.  She states she cannot use Flonase nasal spray that she complains of it draining down her throat.  She is allergic to multiple medications. She states the only antibiotic that usually works for her is a Z-Mundo.    History of chronic allergies: She does take Xyzal and Singulair daily.    History of depression: She had recently lost her mother.  She states she feels very depressed.  She is stable on Trintellix.  She does has been referred to a therapist at the behavioral health clinic.  She denies any suicidal ideation.       Past Medical History  Disease Name Date Onset Notes   Abnormal Pap smear of cervix 02/08/2021 --    ADHD --  --    Alcoholism in remission --  --    Allergic rhinitis due to allergen 08/15/2017 --    Anemia --  --    Anxiety disorder 05/02/2017 --    Asthma 08/15/2017 --    Breast cancer screening 9/1/2017 --    Broken Bones --  --    Congestive Heart Failure --  --    Constipation --  --    Depression 05/02/2017 --    Diabetes --  --    Essential hypertension 05/02/2017 --    Gall stones --  --    Genital herpes 08/15/2017 --    GERD (gastroesophageal reflux disease) 08/20/2020  Discussed symptoms with patient. Encouraged patient to add Famotidine to current med regimen rather than Bentyl. Patient will follow-up in 3mos and we will see if Famotidine helped with her symptoms. Patient understood and agreed with plan.   Head injury --  --    Head injury --  --    Heart attack --  --    Heart disease --  --    Heart Murmur --  --    Hemorrhoids --  --    History of gastric bypass 02/08/2021 --    HPV (human papilloma virus) infection 02/08/2021 --    Hyperlipidemia 08/20/2020 --    Hypertension --  --    IBS (irritable bowel syndrome) 08/20/2020 discussed risks of long term bentyl. Will see if sx improve with better tx of GERD sx. pt not currently having diarrhea or constipation, bloating or pain.   Kidney Stones --  --    Major depressive disorder 02/08/2021 --    Migraine Headaches --  --    Night sweats --  --    Post Traumatic Stress Disorder --  --    Reflux Disease --  --    Seizure --  --    Shortness of Breath --  --    Sinus trouble --  --    STD exposure --  --          Past Surgical History  Procedure Name Date Notes   Adenoidectomy 1996 --    Appendectomy 1975 --    carpal tunnel --  1992 right wrist - 2003 Left hand - left hand 7-7-2016   Cesarian Section 1989 --    Cholecstectomy 2002 --    Colonoscopy 2/16/2018 cologard   Excision of uvula 1996 --    Fatty TUMOR 1993 & 2002 --    Gastric Bypass 2002 rour-en-y   Other --  tubes and overy removal 2006- removal of skin on arms 2006   Stent placment 09- --    Tonsilectomy 1996 --    Tummy tuck 2006 --          Medication List  Name Date Started Instructions   albuterol sulfate 90 mcg/actuation inhalation HFA aerosol inhaler  inhale 2 puffs by inhalation route QD as needed   aspirin 81 mg oral tablet,delayed release (DR/EC) 07/25/2018 take 1 tablet (81 mg) by oral route once daily   caffeine 200 mg oral tablet  take 1 tablet by oral route daily   clonidine HCl 0.1 mg oral tablet 03/23/2021 TAKE 1 TABLET BY MOUTH EVERY NIGHT AT  BEDTIME AS NEEDED FOR HOT FLASHES AND BLOOD PRESSURE for 90 days   cyanocobalamin (vitamin B-12) 1,000 mcg/mL injection solution 02/09/2021 inject 1 milliliter (1,000 mcg) by intramuscular route once weekly x4 doses then once a month   EpiPen 2-Mundo 0.3 mg/0.3 mL injection auto-injector 01/06/2021 inject 0.3 milliliter (0.3 mg) by intramuscular route once as needed for anaphylaxis   hydroxyzine HCl 50 mg oral tablet 02/08/2021 take 1 tablet (50 mg) by oral route 3 times a day.   levocetirizine 5 mg oral tablet  take 1 tablet (5 mg) by oral route once daily at bedtime   montelukast 10 mg oral tablet 03/23/2021 take 1 tablet by oral route daily for allergies for 90 days   omeprazole 40 mg oral capsule,delayed release(/EC) 03/23/2021 take 1 capsule by oral route daily before meal for reflux for 90 days   Trintellix 20 mg oral tablet 03/23/2021 take 1 tablet by oral route daily for 90 days for mood   valacyclovir 1 gram oral tablet 03/23/2021 take 1 tablet (1,000 mg) by oral route once daily for 90 days   Vitamin C 500 mg oral tablet  take 1 tablet by oral route daily   Vitamin D3 2,000 unit oral capsule 07/25/2018 take 1 capsule by oral route daily for 90 days   Xyzal 5 mg oral tablet 03/23/2021 take 1 tablet by oral route daily for 90 days at bedtime for allergies         Allergy List  Allergen Name Date Reaction Notes   Adderall --  --  --    Ambien --  --  --    ampicillin --  --  --    Bananas --  --  --    Bee Stings --  --  --    Brintellix --  --  --    Cephalexin adverse reaction --  --  --    Cockroach --  --  --    Codiene --  --  --    [place] Pollen --  --  --    Cymbalta --  --  --    Darvocet-N 100 --  --  --    Dilaudid --  --  --    Effexor --  --  --    Fescue Pollen --  --  --    Garlic --  --  --    Horses --  --  --    hydrocodone-acetaminophen --  --  --    ibuprofen --  --  --    Keflex --  --  --    Lexapro --  --  --    lisinopril --  --  --    Lortab --  --  --    Neurontin --  --  --     Norvasc --  --  --    Paxil --  --  --    Percocet --  --  --    prednisone --  --  vomiting   Prozac --  --  --    Remeron --  --  --    Seafood --  --  --    Seroquel --  --  --    Serzone --  --  --    Shrimp allergy --  --  --    Strawberry --  --  --    SULFA (SULFONAMIDES) --  --  --    Tessalon Perles --  --  --    trazodone --  --  --    Vicodin --  --  --    Wasp stings --  --  --    Wellbutrin --  --  --    Zyprexa --  --  --        Allergies Reconciled  Family Medical History  Disease Name Relative/Age Notes   Stroke Grandmother (maternal)/  Grandmother (paternal)/   --    Heart Disease  --    - No Family History of Colorectal Cancer  --    Coronary artery disease Aunt/  Brother/  Grandfather (maternal)/  Grandfather (paternal)/  Grandmother (maternal)/  Grandmother (paternal)/  Mother/  Uncle/   --    Prostate cancer Father/   --    Diabetes Brother/  Daughter/   --    Ovarian Cancer, Family History Aunt/   --          Reproductive History  Menstrual   Menopause Status: Postmenopausal   Pregnancy Summary   Total Pregnancies: 3 Full Term: 1 Premature: 0   Ab Induced: 0 Ab Spontaneous: 0 Ectopics: 0   Multiples: 0 Livin         Social History  Finding Status Start/Stop Quantity Notes   Alcohol Former --/-- --  --    Tobacco Former --/-- --  --          Immunizations  NameDate Admin Mfg Trade Name Lot Number Route Inj VIS Given VIS Publication   [place]02/08/2021 [facility] Fluzone Quadrivalent [id number] IM LD 2021 08/15/2019   Comments: Patient tolerated injection well, left in stable condition.   [id number] NE Not Entered  NE NE 2020    Comments:    Tdap08 NE Not Entered  NE NE 2020    Comments:          Review of Systems  · Constitutional  o Denies  o : fever, fatigue, weight loss, weight gain  · HENT  o Admits  o : sinus pain, nasal congestion, ear pain  o Denies  o : headaches, nasal discharge, sore throat  · Cardiovascular  o Denies  o : lower extremity edema,  "claudication, chest pressure, palpitations  · Respiratory  o Denies  o : shortness of breath, wheezing, cough, hemoptysis, dyspnea on exertion  · Gastrointestinal  o Admits  o : nausea  o Denies  o : vomiting, diarrhea, constipation, abdominal pain  · Integument  o Denies  o : rash, itching  · Psychiatric  o Admits  o : depression  o Denies  o : anxiety, suicidal ideation, homicidal ideation      Vitals  Date Time BP Position Site L\R Cuff Size HR RR TEMP (F) WT  HT  BMI kg/m2 BSA m2 O2 Sat FR L/min FiO2 HC       04/02/2021 09:34 /4 Sitting    78 - R  98.6 181lbs 4oz 5'  6.5\" 28.82 1.96 100 %            Physical Examination  · Constitutional  o Appearance  o : no acute distress, well-nourished  · Head and Face  o Head  o :   § Inspection  § : atraumatic, normocephalic  · Ears, Nose, Mouth and Throat  o Ears  o :   § External Ears  § : normal  § Otoscopic Examination  § : fluid present behind right TM, tympanic membrane color normal, middle ear cavities appear normal without fluid or masses A.U.  o Nose  o :   § Intranasal Exam  § : maxillary sinus tender to exam by percussion   o Oral Cavity  o :   § Oral Mucosa  § : moist mucous membranes  o Throat  o :   § Oropharynx  § : no inflammation or lesions present, tonsils surgically absent   · Neck  o Thyroid  o : gland size normal, nontender, no nodules or masses present on palpation, symmetric  · Respiratory  o Respiratory Effort  o : breathing comfortably, symmetric chest rise  o Auscultation of Lungs  o : clear to asculatation bilaterally, no wheezes, rales, or rhonchii  · Cardiovascular  o Heart  o :   § Auscultation of Heart  § : regular rate and rhythm, no murmurs, rubs, or gallops  o Peripheral Vascular System  o :   § Extremities  § : no edema  · Lymphatic  o Neck  o : no lymphadenopathy present  · Neurologic  o Mental Status Examination  o :   § Orientation  § : grossly oriented to person, place and time  o Gait and Station  o :   § Gait Screening  § : " normal gait  · Psychiatric  o General  o : normal mood and affect  o Presence of Abnormal Thoughts  o : no hallucinations, no delusions present, no psychotic thoughts, no homicidal ideation, no suicidal ideation, no evidence of obsessional thinking          Assessment  · Depression     311/F32.9  Stable, recommend to call and get her appointment with the therapist.  · Sinusitis, acute     461.9/J01.90  I will treat her sinus infection with a Z-Mundo, advised to call if not improving.  · Eustachian tube dysfunction, bilateral     381.81/H69.83  I recommended nasal spray or sinus rinses, patient declines.      Plan  · Orders  o ACO-39: Current medications updated and reviewed (1159F, ) - - 04/02/2021  · Medications  o Zithromax Z-Mundo 250 mg oral tablet   SIG: take 2 tablets (500 mg) by oral route once daily for 1 day then 1 tablet (250 mg) by oral route once daily for 4 days   DISP: (6) Tablet with 0 refills  Prescribed on 04/02/2021     o ondansetron 4 mg oral tablet,disintegrating   SIG: place 1 tab on top of tongue to dissolve, then swallow by translingual route every 4-6 hours as needed for nausea   DISP: (20) Tablet with 0 refills  Prescribed on 04/02/2021     · Instructions  o Rest. Increase Fluids.  o Patient was educated/instructed on their diagnosis, treatment and medications prior to discharge from the clinic today.  o Patient instructed to seek medical attention urgently for new or worsening symptoms.  o Call the office with any concerns or questions.  · Disposition  o Call or Return if symptoms worsen or persist.            Electronically Signed by: ROSA Crespo -Author on April 2, 2021 09:57:13 AM

## 2021-05-15 VITALS
HEIGHT: 66 IN | HEART RATE: 82 BPM | OXYGEN SATURATION: 100 % | WEIGHT: 180 LBS | DIASTOLIC BLOOD PRESSURE: 80 MMHG | RESPIRATION RATE: 18 BRPM | SYSTOLIC BLOOD PRESSURE: 128 MMHG | TEMPERATURE: 97.8 F | BODY MASS INDEX: 28.93 KG/M2

## 2021-05-15 VITALS — OXYGEN SATURATION: 96 % | TEMPERATURE: 98.8 F | HEART RATE: 69 BPM

## 2021-05-15 VITALS
WEIGHT: 179 LBS | DIASTOLIC BLOOD PRESSURE: 66 MMHG | SYSTOLIC BLOOD PRESSURE: 148 MMHG | BODY MASS INDEX: 28.77 KG/M2 | HEIGHT: 66 IN | HEART RATE: 78 BPM

## 2021-05-16 VITALS
RESPIRATION RATE: 12 BRPM | OXYGEN SATURATION: 97 % | BODY MASS INDEX: 33.61 KG/M2 | WEIGHT: 209.12 LBS | HEART RATE: 86 BPM | TEMPERATURE: 98.7 F | SYSTOLIC BLOOD PRESSURE: 160 MMHG | DIASTOLIC BLOOD PRESSURE: 87 MMHG | HEIGHT: 66 IN

## 2021-05-16 VITALS
WEIGHT: 203.12 LBS | HEIGHT: 66 IN | BODY MASS INDEX: 32.64 KG/M2 | HEART RATE: 84 BPM | DIASTOLIC BLOOD PRESSURE: 88 MMHG | OXYGEN SATURATION: 97 % | RESPIRATION RATE: 16 BRPM | TEMPERATURE: 98.7 F | SYSTOLIC BLOOD PRESSURE: 128 MMHG

## 2021-05-28 ENCOUNTER — HOSPITAL ENCOUNTER (OUTPATIENT)
Dept: INFUSION THERAPY | Facility: HOSPITAL | Age: 56
Setting detail: RECURRING SERIES
End: 2021-05-28
Attending: NURSE PRACTITIONER

## 2021-06-03 ENCOUNTER — TRANSCRIBE ORDERS (OUTPATIENT)
Dept: ADMINISTRATIVE | Facility: HOSPITAL | Age: 56
End: 2021-06-03

## 2021-06-03 DIAGNOSIS — Z12.31 VISIT FOR SCREENING MAMMOGRAM: Primary | ICD-10-CM

## 2021-06-05 NOTE — PROGRESS NOTES
Progress Note      Patient Name: Esme Mcneil   Patient ID: [id number]   Sex: Female   YOB: 1965    Primary Care Provider: Priscilla LEE   Referring Provider: Priscilla LEE    Visit Date: May 10, 2021    Provider: ROSA Crespo   Location: [facility]   Location Address: [address] [place] [address], [address] [place] [address]  [id number]   Location Phone: [phone]          Chief Complaint  · 3 month follow up      History Of Present Illness  Esme Mcneil is a 55 year old /White female who presents for evaluation and treatment of:      She is here for 3-month follow-up.  She thought she may have a UTI, UA was obtained and is negative.  She is complaining of upset stomach with bloating and nausea since October.  She has a history of gastric bypass.  She takes Tums as needed.  She states she has a history of IBS and is not currently on any medications.  She has been on Bentyl before which did help.    She states she has a history of a left anterior descending coronary artery and has had a stent placed.  She is wanting a referral to cardiology to follow/monitor this.  She also is having uncontrolled hypertension.  Her blood pressure reading in the office today is 178/98 and 196/102.  She denies any chest pain, shortness of breath or dizziness.  She is on clonidine 0.1 mg at bedtime.  She has multiple allergies to medications and states she is unable to take Lisinopril or losartan.    Depression and anxiety:  She complains of not being able to sleep and complains of anxiety.  She has tried multiple medications and has allergies to multiple medications.  She is currently on hydroxyzine 50 mg 3 times per day and is wanting to know if she can take a higher dose.  She is also on Trintellix 20 mg daily.    She has a history of lymphedema in lower extremities bilaterally.  She does not feel like anyone is doing anything about this.    She has a history  of an abnormal Pap smear with positive HPV on 9/29/2020 at her previous PCP.  She was referred to gynecologist Dr. Momin who repeated the Pap smear and was told that she did not have a abnormal Pap smear.    She has a history of diabetes type 2, non-insulin-dependent, diet controlled: Her last A1c was 6.1% on 2/8/2021.  She had gastric bypass surgery which reversed her diabetes.    History of anemia and vitamin B12 deficiency: She has been taking vitamin B12 since her last visit on 2/8/2021.  She would like to have her B12 levels checked again.    She states she fell on her right side and is complaining of a bruise on the right side ribs and still very tender.    Patient was also seen by Renata Rutherford, NP student.       Past Medical History  Disease Name Date Onset Notes   Abnormal Pap smear of cervix 02/08/2021 --    ADHD --  --    Alcoholism in remission --  --    Allergic rhinitis due to allergen 08/15/2017 --    Anemia --  --    Anxiety disorder 05/02/2017 --    Asthma 08/15/2017 --    Breast cancer screening 9/1/2017 --    Broken Bones --  --    Congestive Heart Failure --  --    Constipation --  --    Depression 05/02/2017 --    Diabetes --  --    Essential hypertension 05/02/2017 --    Gall stones --  --    Genital herpes 08/15/2017 --    GERD (gastroesophageal reflux disease) 08/20/2020 Discussed symptoms with patient. Encouraged patient to add Famotidine to current med regimen rather than Bentyl. Patient will follow-up in 3mos and we will see if Famotidine helped with her symptoms. Patient understood and agreed with plan.   Head injury --  --    Head injury --  --    Heart attack --  --    Heart disease --  --    Heart Murmur --  --    Hemorrhoids --  --    History of gastric bypass 02/08/2021 --    HPV (human papilloma virus) infection 02/08/2021 --    Hyperlipidemia 08/20/2020 --    Hypertension --  --    IBS (irritable bowel syndrome) 08/20/2020 discussed risks of long term bentyl. Will see if sx  improve with better tx of GERD sx. pt not currently having diarrhea or constipation, bloating or pain.   Kidney Stones --  --    Major depressive disorder 02/08/2021 --    Migraine Headaches --  --    Night sweats --  --    Post Traumatic Stress Disorder --  --    Reflux Disease --  --    Seizure --  --    Shortness of Breath --  --    Sinus trouble --  --    STD exposure --  --          Past Surgical History  Procedure Name Date Notes   Adenoidectomy 1996 --    Appendectomy 1975 --    carpal tunnel --  1992 right wrist - 2003 Left hand - left hand 7-7-2016   Cesarian Section 1989 --    Cholecstectomy 2002 --    Colonoscopy 2/16/2018 cologard   Excision of uvula 1996 --    Fatty TUMOR 1993 & 2002 --    Gastric Bypass 2002 rour-en-y   Other --  tubes and overy removal 2006- removal of skin on arms 2006   Stent placment 09- --    Tonsilectomy 1996 --    Tummy tuck 2006 --          Medication List  Name Date Started Instructions   albuterol sulfate 90 mcg/actuation inhalation HFA aerosol inhaler  inhale 2 puffs by inhalation route QD as needed   aspirin 81 mg oral tablet,delayed release (DR/EC) 07/25/2018 take 1 tablet (81 mg) by oral route once daily   caffeine 200 mg oral tablet  take 1 tablet by oral route daily   clonidine HCl 0.1 mg oral tablet 05/10/2021 take 2 tablet (0.1 mg) by oral route once daily at bedtime   cyanocobalamin (vitamin B-12) 1,000 mcg/mL injection solution 02/09/2021 inject 1 milliliter (1,000 mcg) by intramuscular route once weekly x4 doses then once a month   EpiPen 2-Mundo 0.3 mg/0.3 mL injection auto-injector 04/30/2021 inject 0.3 milliliter (0.3 mg) by intramuscular route once as needed for anaphylaxis   hydroxyzine HCl 50 mg oral tablet 02/08/2021 take 1 tablet (50 mg) by oral route 3 times a day.   levocetirizine 5 mg oral tablet  take 1 tablet (5 mg) by oral route once daily at bedtime   montelukast 10 mg oral tablet 03/23/2021 take 1 tablet by oral route daily for allergies for 90  days   omeprazole 40 mg oral capsule,delayed release(/EC) 03/23/2021 take 1 capsule by oral route daily before meal for reflux for 90 days   ondansetron 4 mg oral tablet,disintegrating 04/02/2021 place 1 tab on top of tongue to dissolve, then swallow by translingual route every 4-6 hours as needed for nausea   Trintellix 20 mg oral tablet 03/23/2021 take 1 tablet by oral route daily for 90 days for mood   valacyclovir 1 gram oral tablet 03/23/2021 take 1 tablet (1,000 mg) by oral route once daily for 90 days   Vitamin C 500 mg oral tablet  take 1 tablet by oral route daily   Vitamin D3 2,000 unit oral capsule 07/25/2018 take 1 capsule by oral route daily for 90 days   Xyzal 5 mg oral tablet 03/23/2021 take 1 tablet by oral route daily for 90 days at bedtime for allergies         Allergy List  Allergen Name Date Reaction Notes   Adderall --  --  --    Ambien --  --  --    ampicillin --  --  --    Bananas --  --  --    Bee Stings --  --  --    Brintellix --  --  --    Cephalexin adverse reaction --  --  --    Cockroach --  --  --    Codiene --  --  --    [place] Pollen --  --  --    Cymbalta --  --  --    Darvocet-N 100 --  --  --    Dilaudid --  --  --    Effexor --  --  --    Fescue Pollen --  --  --    Garlic --  --  --    Horses --  --  --    hydrocodone-acetaminophen --  --  --    ibuprofen --  --  --    Keflex --  --  --    Lexapro --  --  --    lisinopril --  --  --    Lortab --  --  --    losartan --  --  --    Neurontin --  --  --    Norvasc --  --  --    Paxil --  --  --    Percocet --  --  --    prednisone --  --  vomiting   Prozac --  --  --    Remeron --  --  --    Seafood --  --  --    Seroquel --  --  --    Serzone --  --  --    Shrimp allergy --  --  --    Strawberry --  --  --    SULFA (SULFONAMIDES) --  --  --    Tessalon Perles --  --  --    trazodone --  --  --    Vicodin --  --  --    Wasp stings --  --  --    Wellbutrin --  --  --    Zyprexa --  --  --          Family Medical  History  Disease Name Relative/Age Notes   Stroke Grandmother (maternal)/  Grandmother (paternal)/   --    Heart Disease  --    - No Family History of Colorectal Cancer  --    Coronary artery disease Aunt/  Brother/  Grandfather (maternal)/  Grandfather (paternal)/  Grandmother (maternal)/  Grandmother (paternal)/  Mother/  Uncle/   --    Prostate cancer Father/   --    Diabetes Brother/  Daughter/   --    Ovarian Cancer, Family History Aunt/   --          Reproductive History  Menstrual   Menopause Status: Postmenopausal   Pregnancy Summary   Total Pregnancies: 3 Full Term: 1 Premature: 0   Ab Induced: 0 Ab Spontaneous: 0 Ectopics: 0   Multiples: 0 Livin         Social History  Finding Status Start/Stop Quantity Notes   Alcohol Former --/-- --  --    Tobacco Former --/-- --  --          Immunizations  NameDate Admin Mfg Trade Name Lot Number Route Inj VIS Given VIS Publication   [id number]/08/2021 [facility] Fluzone Quadrivalent [id number] IM LD 2021 08/15/2019   Comments: Patient tolerated injection well, left in stable condition.   [id number] NE Not Entered  NE NE 2020    Comments:    [id number] NE Not Entered  NE NE 2020    Comments:          Review of Systems  · Constitutional  o Denies  o : fever, fatigue, weight loss, weight gain  · Cardiovascular  o Denies  o : lower extremity edema, claudication, chest pressure, palpitations  · Respiratory  o Denies  o : shortness of breath, wheezing, cough, hemoptysis, dyspnea on exertion  · Gastrointestinal  o Admits  o : bloating  o Denies  o : nausea, vomiting, diarrhea, constipation, abdominal pain  · Genitourinary  o Denies  o : urgency, frequency, dysuria  · Psychiatric  o Admits  o : anxiety, depression, difficulty sleeping  o Denies  o : suicidal ideation, homicidal ideation      Vitals  Date Time BP Position Site L\R Cuff Size HR RR TEMP (F) WT  HT  BMI kg/m2 BSA m2 O2 Sat FR L/min FiO2        05/10/2021 02:15 /102 Sitting     "83 - R  98.1 193lbs 0oz 5'  6\" 31.15 2.02 99 %            Physical Examination  · Constitutional  o Appearance  o : no acute distress, well-nourished  · Head and Face  o Head  o :   § Inspection  § : atraumatic, normocephalic  · Neck  o Thyroid  o : gland size normal, nontender, no nodules or masses present on palpation, symmetric  · Respiratory  o Respiratory Effort  o : breathing comfortably, symmetric chest rise  o Auscultation of Lungs  o : clear to asculatation bilaterally, no wheezes, rales, or rhonchii  o Palpation of Chest  o : right lower anterior chest right lateral chest rib tenderness present   · Cardiovascular  o Heart  o :   § Auscultation of Heart  § : regular rate and rhythm, no murmurs, rubs, or gallops  o Peripheral Vascular System  o :   § Extremities  § : marked lower extremity edema present   · Gastrointestinal  o Abdominal Examination  o : epigastric region tenderness to palpation present, hyperactive bowels sounds present, no rebound tenderness present  · Lymphatic  o Neck  o : no lymphadenopathy present  · Neurologic  o Mental Status Examination  o :   § Orientation  § : grossly oriented to person, place and time  o Gait and Station  o :   § Gait Screening  § : normal gait  · Psychiatric  o Mood and Affect  o : anxiety, blunt   o Presence of Abnormal Thoughts  o : no homicidal ideation, no suicidal ideation          Results  · In-Office Procedures  o Lab procedure  § IOP - Urinalysis without Microscopy (Clinitek) [facility] ([id number])   § Color Ur: Yellow   § Clarity Ur: Clear   § Glucose Ur Ql Strip: Negative   § Bilirub Ur Ql Strip: Negative   § Ketones Ur Ql Strip: Negative   § Sp Gr Ur Qn: 1.015   § Hgb Ur Ql Strip: Negative   § pH Ur-LsCnc: 6.0   § Prot Ur Ql Strip: Negative   § Urobilinogen Ur Strip-mCnc: 0.2 E.U./dL   § Nitrite Ur Ql Strip: Negative   § WBC Est Ur Ql Strip: Negative       Assessment  · Anemia     285.9/D64.9  · Essential hypertension, uncontrolled     401.9/I10  Blood pressure " uncontrolled, patient has multiple drug allergies so we will increase clonidine 0.1 mg to 2 tablets daily. I will also get her referred to cardiology.  · Impaired fasting glucose     790.21/R73.01  We will recheck her A1c today with her labs.  · Abnormal Pap smear of cervix     795.00/R87.619  I will get her referred to another gynecologist for further evaluation.  · IBS (irritable bowel syndrome)     564.1/K58.9  discussed risks of long term bentyl. Will see if sx improve with better tx of GERD sx. pt not currently having diarrhea or constipation, bloating or pain.  · Vitamin B12 deficiency     266.2/E53.8  She has been taking vitamin B12 injections, we will recheck her vitamin B12 level today.  · Lymphedema, lower extremities     457.1/I89.0  I will get her referred to the lymphedema clinic for further evaluation and treatment.  · Rib pain on right side     786.50/R07.81  I will have her get an x-ray of the right ribs, will call with results.  · History of heart surgery     V15.1/Z98.890  · History of Left anterior descending (LAD) coronary artery thrombosis     410.90/I24.0  Patient has stent in place, will refer to cardiology to follow.      Plan  · Orders  o Iron panel (iron, TIBC, transferrin saturation) (32314, 06827, 86902) - 285.9/D64.9 - 05/10/2021  o Ferritin ser/plas (68736) - 285.9/D64.9 - 05/10/2021  o CBC with Auto Diff [facility] (45133) - 401.9/I10, 266.2/E53.8 - 05/10/2021  o ACO-39: Current medications updated and reviewed (1159F, ) - - 05/10/2021  o B12 level (73351) - 266.2/E53.8 - 05/10/2021  o Lymphedema Clinic Consult (LYMCL) - 457.1/I89.0 - 05/10/2021   [facility]  o Ribs with PA Chest (Right) 4 or more views X-Ray [facility] (14580) - 786.50/R07.81 - 05/10/2021  o CARDIOLOGY CONSULTATION (CARDI) - V15.1/Z98.890, 401.9/I10, 410.90/I24.0 - 05/10/2021  o OB/GYN CONSULTATION (OBGYN) - 795.00/R87.619 - 05/11/2021   pt has seen Dr. Momin but wants another GYN for 2nd  opinion  · Medications  o dicyclomine 10 mg oral capsule   SIG: take 1 capsule (10 mg) by oral route 3 times per day for 30 days   DISP: (90) Capsule with 5 refills  Prescribed on 05/10/2021     o clonidine HCl 0.1 mg oral tablet   SIG: take 2 tablet (0.1 mg) by oral route once daily at bedtime   DISP: (60) Tablet with 3 refills  Adjusted on 05/10/2021     o Medications have been Reconciled  o Transition of Care or Provider Policy  · Instructions  o Patient advised to monitor blood pressure (B/P) at home and journal readings. Patient informed that a B/P reading at home of more than 130/80 is considered hypertension. For readings greater rtts353/90 or higher patient is advised to follow up in the office with readings for management. Patient advised to limit sodium intake.  o Instructed patient to watch their diet and exercise.  o Patient was educated/instructed on their diagnosis, treatment and medications prior to discharge from the clinic today.  o Patient instructed to seek medical attention urgently for new or worsening symptoms.  o Call the office with any concerns or questions.  · Disposition  o Return to clinic in 3 months            Electronically Signed by: Priscilla Diallo APRN -Author on May 17, 2021 07:51:08 AM

## 2021-06-16 ENCOUNTER — CLINICAL SUPPORT (OUTPATIENT)
Dept: FAMILY MEDICINE CLINIC | Facility: CLINIC | Age: 56
End: 2021-06-16

## 2021-06-16 DIAGNOSIS — E53.8 B12 DEFICIENCY: Primary | ICD-10-CM

## 2021-06-16 PROCEDURE — 96372 THER/PROPH/DIAG INJ SC/IM: CPT | Performed by: NURSE PRACTITIONER

## 2021-06-16 RX ORDER — CYANOCOBALAMIN 1000 UG/ML
1000 INJECTION, SOLUTION INTRAMUSCULAR; SUBCUTANEOUS
Status: DISCONTINUED | OUTPATIENT
Start: 2021-06-16 | End: 2021-09-15

## 2021-06-16 RX ADMIN — CYANOCOBALAMIN 1000 MCG: 1000 INJECTION, SOLUTION INTRAMUSCULAR; SUBCUTANEOUS at 09:51

## 2021-06-17 ENCOUNTER — TRANSCRIBE ORDERS (OUTPATIENT)
Dept: ADMINISTRATIVE | Facility: HOSPITAL | Age: 56
End: 2021-06-17

## 2021-06-17 DIAGNOSIS — E61.1 IRON DEFICIENCY: Primary | ICD-10-CM

## 2021-06-17 DIAGNOSIS — Z98.84 PERSONAL HX OF GASTRIC BYPASS: ICD-10-CM

## 2021-06-17 DIAGNOSIS — D50.9 IRON DEFICIENCY ANEMIA, UNSPECIFIED IRON DEFICIENCY ANEMIA TYPE: ICD-10-CM

## 2021-06-18 ENCOUNTER — HOSPITAL ENCOUNTER (OUTPATIENT)
Dept: MAMMOGRAPHY | Facility: HOSPITAL | Age: 56
Discharge: HOME | End: 2021-06-18
Admitting: NURSE PRACTITIONER

## 2021-06-18 DIAGNOSIS — Z12.31 VISIT FOR SCREENING MAMMOGRAM: ICD-10-CM

## 2021-06-18 PROCEDURE — 77063 BREAST TOMOSYNTHESIS BI: CPT

## 2021-06-18 PROCEDURE — 77067 SCR MAMMO BI INCL CAD: CPT

## 2021-06-25 DIAGNOSIS — Z12.11 SCREENING FOR MALIGNANT NEOPLASM OF COLON: Primary | ICD-10-CM

## 2021-07-01 ENCOUNTER — CLINICAL SUPPORT (OUTPATIENT)
Dept: FAMILY MEDICINE CLINIC | Facility: CLINIC | Age: 56
End: 2021-07-01

## 2021-07-01 DIAGNOSIS — E53.8 B12 DEFICIENCY: Primary | ICD-10-CM

## 2021-07-01 PROCEDURE — 96372 THER/PROPH/DIAG INJ SC/IM: CPT | Performed by: NURSE PRACTITIONER

## 2021-07-01 RX ORDER — CYANOCOBALAMIN 1000 UG/ML
1000 INJECTION, SOLUTION INTRAMUSCULAR; SUBCUTANEOUS
Status: DISCONTINUED | OUTPATIENT
Start: 2021-07-01 | End: 2021-09-15

## 2021-07-01 RX ADMIN — CYANOCOBALAMIN 1000 MCG: 1000 INJECTION, SOLUTION INTRAMUSCULAR; SUBCUTANEOUS at 13:32

## 2021-07-09 RX ORDER — MONTELUKAST SODIUM 10 MG/1
1 TABLET ORAL DAILY
COMMUNITY
Start: 2021-03-23 | End: 2021-09-30 | Stop reason: SDUPTHER

## 2021-07-09 RX ORDER — HYDROXYZINE 50 MG/1
1 TABLET, FILM COATED ORAL 3 TIMES DAILY
COMMUNITY
Start: 2021-06-14 | End: 2022-03-24

## 2021-07-09 RX ORDER — CAFFEINE 200 MG
1 TABLET ORAL DAILY
COMMUNITY

## 2021-07-09 RX ORDER — ONDANSETRON 4 MG/1
1 TABLET, ORALLY DISINTEGRATING ORAL EVERY 4 HOURS PRN
COMMUNITY
Start: 2021-04-02 | End: 2021-08-10

## 2021-07-09 RX ORDER — CYANOCOBALAMIN 1000 UG/ML
1 INJECTION, SOLUTION INTRAMUSCULAR; SUBCUTANEOUS
COMMUNITY
Start: 2021-05-28 | End: 2021-08-10

## 2021-07-09 RX ORDER — DICYCLOMINE HYDROCHLORIDE 10 MG/1
10 CAPSULE ORAL 3 TIMES DAILY
COMMUNITY
Start: 2021-06-24 | End: 2021-08-10

## 2021-07-09 RX ORDER — OMEPRAZOLE 40 MG/1
1 CAPSULE, DELAYED RELEASE ORAL DAILY
COMMUNITY
Start: 2021-03-23 | End: 2021-09-30 | Stop reason: SDUPTHER

## 2021-07-09 RX ORDER — VALACYCLOVIR HYDROCHLORIDE 1 G/1
1 TABLET, FILM COATED ORAL DAILY
COMMUNITY
Start: 2021-03-23 | End: 2021-09-30 | Stop reason: SDUPTHER

## 2021-07-09 RX ORDER — EPINEPHRINE 0.3 MG/.3ML
INJECTION SUBCUTANEOUS SEE ADMIN INSTRUCTIONS
COMMUNITY
Start: 2021-06-25 | End: 2021-07-13 | Stop reason: SDUPTHER

## 2021-07-09 RX ORDER — VORTIOXETINE 20 MG/1
1 TABLET, FILM COATED ORAL DAILY
COMMUNITY
Start: 2021-06-07 | End: 2021-07-13 | Stop reason: SDUPTHER

## 2021-07-09 RX ORDER — ALBUTEROL SULFATE 90 UG/1
2 AEROSOL, METERED RESPIRATORY (INHALATION) AS NEEDED
COMMUNITY
End: 2022-02-09 | Stop reason: SDUPTHER

## 2021-07-09 RX ORDER — LEVOCETIRIZINE DIHYDROCHLORIDE 5 MG/1
1 TABLET, FILM COATED ORAL DAILY
COMMUNITY
Start: 2021-06-01 | End: 2021-09-30 | Stop reason: SDUPTHER

## 2021-07-09 RX ORDER — ASCORBIC ACID 500 MG
1 TABLET ORAL DAILY
COMMUNITY

## 2021-07-13 ENCOUNTER — OFFICE VISIT (OUTPATIENT)
Dept: FAMILY MEDICINE CLINIC | Facility: CLINIC | Age: 56
End: 2021-07-13

## 2021-07-13 VITALS
WEIGHT: 184 LBS | SYSTOLIC BLOOD PRESSURE: 160 MMHG | TEMPERATURE: 98.6 F | OXYGEN SATURATION: 97 % | HEIGHT: 67 IN | BODY MASS INDEX: 28.88 KG/M2 | HEART RATE: 75 BPM | DIASTOLIC BLOOD PRESSURE: 110 MMHG

## 2021-07-13 DIAGNOSIS — J30.9 ALLERGIC RHINITIS, UNSPECIFIED SEASONALITY, UNSPECIFIED TRIGGER: ICD-10-CM

## 2021-07-13 DIAGNOSIS — F41.1 GENERALIZED ANXIETY DISORDER: ICD-10-CM

## 2021-07-13 DIAGNOSIS — I10 ESSENTIAL HYPERTENSION: Primary | ICD-10-CM

## 2021-07-13 DIAGNOSIS — F43.10 POST TRAUMATIC STRESS DISORDER: ICD-10-CM

## 2021-07-13 DIAGNOSIS — F33.2 SEVERE EPISODE OF RECURRENT MAJOR DEPRESSIVE DISORDER, WITHOUT PSYCHOTIC FEATURES (HCC): ICD-10-CM

## 2021-07-13 PROBLEM — F32.9 MAJOR DEPRESSIVE DISORDER: Status: ACTIVE | Noted: 2021-02-08

## 2021-07-13 PROBLEM — D64.9 ANEMIA: Status: ACTIVE | Noted: 2021-07-13

## 2021-07-13 PROBLEM — A60.00 GENITAL HERPES: Status: ACTIVE | Noted: 2017-08-15

## 2021-07-13 PROBLEM — J45.909 ASTHMA: Status: ACTIVE | Noted: 2017-08-15

## 2021-07-13 PROBLEM — I50.9 CONGESTIVE HEART FAILURE: Status: ACTIVE | Noted: 2021-07-13

## 2021-07-13 PROBLEM — I89.0 LYMPHEDEMA: Status: ACTIVE | Noted: 2021-05-11

## 2021-07-13 PROBLEM — I24.0: Status: ACTIVE | Noted: 2021-05-11

## 2021-07-13 PROBLEM — Z98.84 BARIATRIC SURGERY STATUS: Status: ACTIVE | Noted: 2021-02-08

## 2021-07-13 PROBLEM — E78.5 HYPERLIPIDEMIA: Status: ACTIVE | Noted: 2020-08-20

## 2021-07-13 PROBLEM — K21.9 GERD (GASTROESOPHAGEAL REFLUX DISEASE): Status: ACTIVE | Noted: 2020-08-20

## 2021-07-13 PROBLEM — F41.9 ANXIETY DISORDER: Status: ACTIVE | Noted: 2017-05-02

## 2021-07-13 PROBLEM — I21.9 HEART ATTACK (HCC): Status: ACTIVE | Noted: 2021-07-13

## 2021-07-13 PROBLEM — B97.7 HPV (HUMAN PAPILLOMA VIRUS) INFECTION: Status: ACTIVE | Noted: 2021-02-08

## 2021-07-13 PROBLEM — F10.21 ALCOHOLISM IN REMISSION: Status: ACTIVE | Noted: 2021-07-13

## 2021-07-13 PROCEDURE — 99214 OFFICE O/P EST MOD 30 MIN: CPT | Performed by: NURSE PRACTITIONER

## 2021-07-13 RX ORDER — ASPIRIN 81 MG/1
81 TABLET ORAL DAILY
COMMUNITY

## 2021-07-13 RX ORDER — HYDROCHLOROTHIAZIDE 12.5 MG/1
12.5 TABLET ORAL DAILY
Qty: 30 TABLET | Refills: 3 | Status: SHIPPED | OUTPATIENT
Start: 2021-07-13 | End: 2021-10-26 | Stop reason: SDDI

## 2021-07-13 RX ORDER — CLONIDINE HYDROCHLORIDE 0.1 MG/1
0.2 TABLET ORAL 2 TIMES DAILY
COMMUNITY
Start: 2021-07-08 | End: 2021-10-29

## 2021-07-13 RX ORDER — VORTIOXETINE 20 MG/1
1 TABLET, FILM COATED ORAL DAILY
Qty: 30 TABLET | Refills: 2 | Status: SHIPPED | OUTPATIENT
Start: 2021-07-13 | End: 2021-10-26 | Stop reason: SINTOL

## 2021-07-13 RX ORDER — FLUTICASONE PROPIONATE 50 MCG
2 SPRAY, SUSPENSION (ML) NASAL DAILY
Qty: 16 G | Refills: 5 | Status: SHIPPED | OUTPATIENT
Start: 2021-07-13 | End: 2022-03-24

## 2021-07-13 RX ORDER — EPINEPHRINE 0.3 MG/.3ML
0.3 INJECTION SUBCUTANEOUS SEE ADMIN INSTRUCTIONS
Qty: 1 EACH | Refills: 2 | Status: SHIPPED | OUTPATIENT
Start: 2021-07-13 | End: 2021-09-15 | Stop reason: SDUPTHER

## 2021-07-13 NOTE — PROGRESS NOTES
"Chief Complaint  Follow-up and Depression    Subjective          Esme Mcneil presents to [facility]  History of Present Illness  She is here for routine follow-up and requesting refills on EpiPen and Trintellix.    History of major depression, generalized anxiety and PTSD.  Delete that: She is currently on Trintellix 20 mg daily.  She is wanting to now if her hydroxyzine 50 mg 3 times daily can be increased because she still feels anxious.  She is supposed to be scheduling appointment with Dr. Rodriguez but has not been able to.    Hypertension: Her blood pressure is high today at 160/110.  She states that her neck is hurting her today.  She is on clonidine 0.2 mg twice daily.  She has multiple allergies to other medications.    Seasonal allergies: She is complaining that her allergies have been bothering her more lately.  She already takes Xyzal and Singulair daily.  She complains of her ears crackling and popping.  Objective   Vital Signs:   BP (!) 160/110   Pulse 75   Temp 98.6 °F (37 °C)   Ht 168.9 cm (66.5\")   Wt 83.5 kg (184 lb)   SpO2 97%   BMI 29.25 kg/m²     Physical Exam  Vitals reviewed.   Constitutional:       Appearance: Normal appearance. She is well-developed.   HENT:      Right Ear: Hearing, ear canal and external ear normal. There is no impacted cerumen. Tympanic membrane is not injected.      Left Ear: Hearing, ear canal and external ear normal. There is no impacted cerumen. Tympanic membrane is not injected.      Ears:      Comments: Fluid bubbles noted on RT TM.  Neck:      Thyroid: No thyroid mass, thyromegaly or thyroid tenderness.   Cardiovascular:      Rate and Rhythm: Normal rate and regular rhythm.      Heart sounds: No murmur heard.   No friction rub. No gallop.    Pulmonary:      Effort: Pulmonary effort is normal.      Breath sounds: Normal breath sounds. No wheezing or rhonchi.   Lymphadenopathy:      Cervical: No cervical adenopathy. "   Skin:     General: Skin is warm and dry.   Neurological:      Mental Status: She is alert and oriented to person, place, and time.      Cranial Nerves: No cranial nerve deficit.   Psychiatric:         Mood and Affect: Mood and affect normal.         Behavior: Behavior normal.         Thought Content: Thought content normal. Thought content does not include homicidal or suicidal ideation.         Judgment: Judgment normal.        Result Review :                 Assessment and Plan    Diagnoses and all orders for this visit:    1. Essential hypertension (Primary)  Assessment & Plan:  Hypertension is worsening.  Medication changes per orders.  Blood pressure will be reassessed in 3 months.    Orders:  -     Ambulatory Referral to Psychiatry    2. Severe episode of recurrent major depressive disorder, without psychotic features (CMS/HCC)  Assessment & Plan:  Patient's depression is recurrent and is severe without psychosis. Their depression is currently active and the condition is worsening. This will be reassessed at the next regular appointment. F/U as described:patient will continue current medication therapy and patient referred to Mental Health Specialist.    Orders:  -     Ambulatory Referral to Psychiatry    3. Post traumatic stress disorder  -     Ambulatory Referral to Psychiatry    4. Generalized anxiety disorder  Assessment & Plan:  Psychological condition is worsening.  Referral to psychological counseling.  Psychological condition  will be reassessed at the next regular appointment.    Orders:  -     Ambulatory Referral to Psychiatry    5. Allergic rhinitis, unspecified seasonality, unspecified trigger  Comments:  We will add on Flonase to help with her allergies.  Advised proper administration of Flonase.    Other orders  -     hydroCHLOROthiazide (HYDRODIURIL) 12.5 MG tablet; Take 1 tablet by mouth Daily.  Dispense: 30 tablet; Refill: 3  -     EPINEPHrine (EPIPEN) 0.3 MG/0.3ML solution auto-injector  injection; Inject 0.3 mL under the skin into the appropriate area as directed See Admin Instructions.  Dispense: 1 each; Refill: 2  -     Trintellix 20 MG tablet; Take 20 mg by mouth Daily.  Dispense: 30 tablet; Refill: 2  -     fluticasone (Flonase) 50 MCG/ACT nasal spray; 2 sprays into the nostril(s) as directed by provider Daily.  Dispense: 16 g; Refill: 5      Follow Up   Return in about 3 months (around 10/13/2021) for 30 min apt for complex pt.  Patient was given instructions and counseling regarding her condition or for health maintenance advice. Please see specific information pulled into the AVS if appropriate.

## 2021-07-13 NOTE — ASSESSMENT & PLAN NOTE
Patient's depression is recurrent and is severe without psychosis. Their depression is currently active and the condition is worsening. This will be reassessed at the next regular appointment. F/U as described:patient will continue current medication therapy and patient referred to Mental Health Specialist.

## 2021-07-13 NOTE — PATIENT INSTRUCTIONS
"Hypertension, Adult  High blood pressure (hypertension) is when the force of blood pumping through the arteries is too strong. The arteries are the blood vessels that carry blood from the heart throughout the body. Hypertension forces the heart to work harder to pump blood and may cause arteries to become narrow or stiff. Untreated or uncontrolled hypertension can cause a heart attack, heart failure, a stroke, kidney disease, and other problems.  A blood pressure reading consists of a higher number over a lower number. Ideally, your blood pressure should be below 120/80. The first (\"top\") number is called the systolic pressure. It is a measure of the pressure in your arteries as your heart beats. The second (\"bottom\") number is called the diastolic pressure. It is a measure of the pressure in your arteries as the heart relaxes.  What are the causes?  The exact cause of this condition is not known. There are some conditions that result in or are related to high blood pressure.  What increases the risk?  Some risk factors for high blood pressure are under your control. The following factors may make you more likely to develop this condition:  · Smoking.  · Having type 2 diabetes mellitus, high cholesterol, or both.  · Not getting enough exercise or physical activity.  · Being overweight.  · Having too much fat, sugar, calories, or salt (sodium) in your diet.  · Drinking too much alcohol.  Some risk factors for high blood pressure may be difficult or impossible to change. Some of these factors include:  · Having chronic kidney disease.  · Having a family history of high blood pressure.  · Age. Risk increases with age.  · Race. You may be at higher risk if you are .  · Gender. Men are at higher risk than women before age 45. After age 65, women are at higher risk than men.  · Having obstructive sleep apnea.  · Stress.  What are the signs or symptoms?  High blood pressure may not cause symptoms. Very high " blood pressure (hypertensive crisis) may cause:  · Headache.  · Anxiety.  · Shortness of breath.  · Nosebleed.  · Nausea and vomiting.  · Vision changes.  · Severe chest pain.  · Seizures.  How is this diagnosed?  This condition is diagnosed by measuring your blood pressure while you are seated, with your arm resting on a flat surface, your legs uncrossed, and your feet flat on the floor. The cuff of the blood pressure monitor will be placed directly against the skin of your upper arm at the level of your heart. It should be measured at least twice using the same arm. Certain conditions can cause a difference in blood pressure between your right and left arms.  Certain factors can cause blood pressure readings to be lower or higher than normal for a short period of time:  · When your blood pressure is higher when you are in a health care provider's office than when you are at home, this is called white coat hypertension. Most people with this condition do not need medicines.  · When your blood pressure is higher at home than when you are in a health care provider's office, this is called masked hypertension. Most people with this condition may need medicines to control blood pressure.  If you have a high blood pressure reading during one visit or you have normal blood pressure with other risk factors, you may be asked to:  · Return on a different day to have your blood pressure checked again.  · Monitor your blood pressure at home for 1 week or longer.  If you are diagnosed with hypertension, you may have other blood or imaging tests to help your health care provider understand your overall risk for other conditions.  How is this treated?  This condition is treated by making healthy lifestyle changes, such as eating healthy foods, exercising more, and reducing your alcohol intake. Your health care provider may prescribe medicine if lifestyle changes are not enough to get your blood pressure under control, and  if:  · Your systolic blood pressure is above 130.  · Your diastolic blood pressure is above 80.  Your personal target blood pressure may vary depending on your medical conditions, your age, and other factors.  Follow these instructions at home:  Eating and drinking    · Eat a diet that is high in fiber and potassium, and low in sodium, added sugar, and fat. An example eating plan is called the DASH (Dietary Approaches to Stop Hypertension) diet. To eat this way:  ? Eat plenty of fresh fruits and vegetables. Try to fill one half of your plate at each meal with fruits and vegetables.  ? Eat whole grains, such as whole-wheat pasta, brown rice, or whole-grain bread. Fill about one fourth of your plate with whole grains.  ? Eat or drink low-fat dairy products, such as skim milk or low-fat yogurt.  ? Avoid fatty cuts of meat, processed or cured meats, and poultry with skin. Fill about one fourth of your plate with lean proteins, such as fish, chicken without skin, beans, eggs, or tofu.  ? Avoid pre-made and processed foods. These tend to be higher in sodium, added sugar, and fat.  · Reduce your daily sodium intake. Most people with hypertension should eat less than 1,500 mg of sodium a day.  · Do not drink alcohol if:  ? Your health care provider tells you not to drink.  ? You are pregnant, may be pregnant, or are planning to become pregnant.  · If you drink alcohol:  ? Limit how much you use to:  § 0-1 drink a day for women.  § 0-2 drinks a day for men.  ? Be aware of how much alcohol is in your drink. In the [place] one drink equals one 12 oz bottle of beer (355 mL), one 5 oz glass of wine (148 mL), or one 1½ oz glass of hard liquor (44 mL).  Lifestyle    · Work with your health care provider to maintain a healthy body weight or to lose weight. Ask what an ideal weight is for you.  · Get at least 30 minutes of exercise most days of the week. Activities may include walking, swimming, or biking.  · Include exercise to  strengthen your muscles (resistance exercise), such as Pilates or lifting weights, as part of your weekly exercise routine. Try to do these types of exercises for 30 minutes at least 3 days a week.  · Do not use any products that contain nicotine or tobacco, such as cigarettes, e-cigarettes, and chewing tobacco. If you need help quitting, ask your health care provider.  · Monitor your blood pressure at home as told by your health care provider.  · Keep all follow-up visits as told by your health care provider. This is important.  Medicines  · Take over-the-counter and prescription medicines only as told by your health care provider. Follow directions carefully. Blood pressure medicines must be taken as prescribed.  · Do not skip doses of blood pressure medicine. Doing this puts you at risk for problems and can make the medicine less effective.  · Ask your health care provider about side effects or reactions to medicines that you should watch for.  Contact a health care provider if you:  · Think you are having a reaction to a medicine you are taking.  · Have headaches that keep coming back (recurring).  · Feel dizzy.  · Have swelling in your ankles.  · Have trouble with your vision.  Get help right away if you:  · Develop a severe headache or confusion.  · Have unusual weakness or numbness.  · Feel faint.  · Have severe pain in your chest or abdomen.  · Vomit repeatedly.  · Have trouble breathing.  Summary  · Hypertension is when the force of blood pumping through your arteries is too strong. If this condition is not controlled, it may put you at risk for serious complications.  · Your personal target blood pressure may vary depending on your medical conditions, your age, and other factors. For most people, a normal blood pressure is less than 120/80.  · Hypertension is treated with lifestyle changes, medicines, or a combination of both. Lifestyle changes include losing weight, eating a healthy, low-sodium diet,  "exercising more, and limiting alcohol.  This information is not intended to replace advice given to you by your health care provider. Make sure you discuss any questions you have with your health care provider.  Document Revised: 08/28/2019 Document Reviewed: 08/28/2019  [employer] Patient Education © 2021 [employer]    http://APA.org/depression-guideline\"> https://Farmivore.Pinshape\"> http://point-of-care.Ziptr.Pinshape/skills/\"> http://point-of-care.Ziptr.com\">   Managing Depression, Adult  Depression is a mental health condition that affects your thoughts, feelings, and actions. Being diagnosed with depression can bring you relief if you did not know why you have felt or behaved a certain way. It could also leave you feeling overwhelmed with uncertainty about your future. Preparing yourself to manage your symptoms can help you feel more positive about your future.  How to manage lifestyle changes  Managing stress    Stress is your body's reaction to life changes and events, both good and bad. Stress can add to your feelings of depression. Learning to manage your stress can help lessen your feelings of depression.  Try some of the following approaches to reducing your stress (stress reduction techniques):  · Listen to music that you enjoy and that inspires you.  · Try using a meditation anson or take a meditation class.  · Develop a practice that helps you connect with your spiritual self. Walk in nature, pray, or go to a place of Faith.  · Do some deep breathing. To do this, inhale slowly through your nose. Pause at the top of your inhale for a few seconds and then exhale slowly, letting your muscles relax.  · Practice yoga to help relax and work your muscles.  Choose a stress reduction technique that suits your lifestyle and personality. These techniques take time and practice to develop. Set aside 5-15 minutes a day to do them. Therapists can offer training in these " techniques. Other things you can do to manage stress include:  · Keeping a stress diary.  · Knowing your limits and saying no when you think something is too much.  · Paying attention to how you react to certain situations. You may not be able to control everything, but you can change your reaction.  · Adding humor to your life by watching funny films or TV shows.  · Making time for activities that you enjoy and that relax you.    Medicines  Medicines, such as antidepressants, are often a part of treatment for depression.  · Talk with your pharmacist or health care provider about all the medicines, supplements, and herbal products that you take, their possible side effects, and what medicines and other products are safe to take together.  · Make sure to report any side effects you may have to your health care provider.  Relationships  Your health care provider may suggest family therapy, couples therapy, or individual therapy as part of your treatment.  How to recognize changes  Everyone responds differently to treatment for depression. As you recover from depression, you may start to:  · Have more interest in doing activities.  · Feel less hopeless.  · Have more energy.  · Overeat less often, or have a better appetite.  · Have better mental focus.  It is important to recognize if your depression is not getting better or is getting worse. The symptoms you had in the beginning may return, such as:  · Tiredness (fatigue) or low energy.  · Eating too much or too little.  · Sleeping too much or too little.  · Feeling restless, agitated, or hopeless.  · Trouble focusing or making decisions.  · Unexplained physical complaints.  · Feeling irritable, angry, or aggressive.  If you or your family members notice these symptoms coming back, let your health care provider know right away.  Follow these instructions at home:  Activity    · Try to get some form of exercise each day, such as walking, biking, swimming, or lifting  weights.  · Practice stress reduction techniques.  · Engage your mind by taking a class or doing some volunteer work.  Lifestyle  · Get the right amount and quality of sleep.  · Cut down on using caffeine, tobacco, alcohol, and other potentially harmful substances.  · Eat a healthy diet that includes plenty of vegetables, fruits, whole grains, low-fat dairy products, and lean protein. Do not eat a lot of foods that are high in solid fats, added sugars, or salt (sodium).  General instructions  · Take over-the-counter and prescription medicines only as told by your health care provider.  · Keep all follow-up visits as told by your health care provider. This is important.  Where to find support  Talking to others    Friends and family members can be sources of support and guidance. Talk to trusted friends or family members about your condition. Explain your symptoms to them, and let them know that you are working with a health care provider to treat your depression. Tell friends and family members how they also can be helpful.  Finances  · Find appropriate mental health providers that fit with your financial situation.  · Talk with your health care provider about options to get reduced prices on your medicines.  Where to find more information  You can find support in your area from:  · Anxiety and Depression Association of [place] (ADAA): www.adaa.org  · Mental Health [place]: www.mentalhealthamer[place].net  · National [place] on Mental Illness: www.[place].org  Contact a health care provider if:  · You stop taking your antidepressant medicines, and you have any of these symptoms:  ? Nausea.  ? Headache.  ? Light-headedness.  ? Chills and body aches.  ? Not being able to sleep (insomnia).  · You or your friends and family think your depression is getting worse.  Get help right away if:  · You have thoughts of hurting yourself or others.  If you ever feel like you may hurt yourself or others, or have thoughts about taking  your own life, get help right away. Go to your nearest emergency department or:  · Call your local emergency services (001 in the [place]  · Call a suicide crisis helpline, such as the National Suicide Prevention Lifeline at 1-132.246.1893. This is open 24 hours a day in the [place]  · Text the Crisis Text Line at 819755 (in the [place]  Summary  · If you are diagnosed with depression, preparing yourself to manage your symptoms is a good way to feel positive about your future.  · Work with your health care provider on a management plan that includes stress reduction techniques, medicines (if applicable), therapy, and healthy lifestyle habits.  · Keep talking with your health care provider about how your treatment is working.  · If you have thoughts about taking your own life, call a suicide crisis helpline or text a crisis text line.  This information is not intended to replace advice given to you by your health care provider. Make sure you discuss any questions you have with your health care provider.  Document Revised: 10/28/2020 Document Reviewed: 10/28/2020  [employer] Patient Education © 2021 [employer]    Managing Anxiety, Adult  After being diagnosed with an anxiety disorder, you may be relieved to know why you have felt or behaved a certain way. You may also feel overwhelmed about the treatment ahead and what it will mean for your life. With care and support, you can manage this condition and recover from it.  How to manage lifestyle changes  Managing stress and anxiety    Stress is your body's reaction to life changes and events, both good and bad. Most stress will last just a few hours, but stress can be ongoing and can lead to more than just stress. Although stress can play a major role in anxiety, it is not the same as anxiety. Stress is usually caused by something external, such as a deadline, test, or competition. Stress normally passes after the triggering event has ended.   Anxiety is caused by something  internal, such as imagining a terrible outcome or worrying that something will go wrong that will devastate you. Anxiety often does not go away even after the triggering event is over, and it can become long-term (chronic) worry. It is important to understand the differences between stress and anxiety and to manage your stress effectively so that it does not lead to an anxious response.  Talk with your health care provider or a counselor to learn more about reducing anxiety and stress. He or she may suggest tension reduction techniques, such as:  · Music therapy. This can include creating or listening to music that you enjoy and that inspires you.  · Mindfulness-based meditation. This involves being aware of your normal breaths while not trying to control your breathing. It can be done while sitting or walking.  · Centering prayer. This involves focusing on a word, phrase, or sacred image that means something to you and brings you peace.  · Deep breathing. To do this, expand your stomach and inhale slowly through your nose. Hold your breath for 3-5 seconds. Then exhale slowly, letting your stomach muscles relax.  · Self-talk. This involves identifying thought patterns that lead to anxiety reactions and changing those patterns.  · Muscle relaxation. This involves tensing muscles and then relaxing them.  Choose a tension reduction technique that suits your lifestyle and personality. These techniques take time and practice. Set aside 5-15 minutes a day to do them. Therapists can offer counseling and training in these techniques. The training to help with anxiety may be covered by some insurance plans. Other things you can do to manage stress and anxiety include:  · Keeping a stress/anxiety diary. This can help you learn what triggers your reaction and then learn ways to manage your response.  · Thinking about how you react to certain situations. You may not be able to control everything, but you can control your  response.  · Making time for activities that help you relax and not feeling guilty about spending your time in this way.  · Visual imagery and yoga can help you stay calm and relax.    Medicines  Medicines can help ease symptoms. Medicines for anxiety include:  · Anti-anxiety drugs.  · Antidepressants.  Medicines are often used as a primary treatment for anxiety disorder. Medicines will be prescribed by a health care provider. When used together, medicines, psychotherapy, and tension reduction techniques may be the most effective treatment.  Relationships  Relationships can play a big part in helping you recover. Try to spend more time connecting with trusted friends and family members. Consider going to couples counseling, taking family education classes, or going to family therapy. Therapy can help you and others better understand your condition.  How to recognize changes in your anxiety  Everyone responds differently to treatment for anxiety. Recovery from anxiety happens when symptoms decrease and stop interfering with your daily activities at home or work. This may mean that you will start to:  · Have better concentration and focus. Worry will interfere less in your daily thinking.  · Sleep better.  · Be less irritable.  · Have more energy.  · Have improved memory.  It is important to recognize when your condition is getting worse. Contact your health care provider if your symptoms interfere with home or work and you feel like your condition is not improving.  Follow these instructions at home:  Activity  · Exercise. Most adults should do the following:  ? Exercise for at least 150 minutes each week. The exercise should increase your heart rate and make you sweat (moderate-intensity exercise).  ? Strengthening exercises at least twice a week.  · Get the right amount and quality of sleep. Most adults need 7-9 hours of sleep each night.  Lifestyle    · Eat a healthy diet that includes plenty of vegetables,  fruits, whole grains, low-fat dairy products, and lean protein. Do not eat a lot of foods that are high in solid fats, added sugars, or salt.  · Make choices that simplify your life.  · Do not use any products that contain nicotine or tobacco, such as cigarettes, e-cigarettes, and chewing tobacco. If you need help quitting, ask your health care provider.  · Avoid caffeine, alcohol, and certain over-the-counter cold medicines. These may make you feel worse. Ask your pharmacist which medicines to avoid.  General instructions  · Take over-the-counter and prescription medicines only as told by your health care provider.  · Keep all follow-up visits as told by your health care provider. This is important.  Where to find support  You can get help and support from these sources:  · Self-help groups.  · Online and community organizations.  · A trusted spiritual leader.  · Couples counseling.  · Family education classes.  · Family therapy.  Where to find more information  You may find that joining a support group helps you deal with your anxiety. The following sources can help you locate counselors or support groups near you:  · Mental Health [place]: www.mentalhealthamerica.net  · Anxiety and Depression Association of [place] (ADAA): www.adaa.org  · National [place] on Mental Illness (ANDREA): www.andrea.org  Contact a health care provider if you:  · Have a hard time staying focused or finishing daily tasks.  · Spend many hours a day feeling worried about everyday life.  · Become exhausted by worry.  · Start to have headaches, feel tense, or have nausea.  · Urinate more than normal.  · Have diarrhea.  Get help right away if you have:  · A racing heart and shortness of breath.  · Thoughts of hurting yourself or others.  If you ever feel like you may hurt yourself or others, or have thoughts about taking your own life, get help right away. You can go to your nearest emergency department or call:  · Your local emergency services  (911 in the [place]  · A suicide crisis helpline, such as the National Suicide Prevention Lifeline at 1-679.442.1037. This is open 24 hours a day.  Summary  · Taking steps to learn and use tension reduction techniques can help calm you and help prevent triggering an anxiety reaction.  · When used together, medicines, psychotherapy, and tension reduction techniques may be the most effective treatment.  · Family, friends, and partners can play a big part in helping you recover from an anxiety disorder.  This information is not intended to replace advice given to you by your health care provider. Make sure you discuss any questions you have with your health care provider.  Document Revised: 05/19/2020 Document Reviewed: 05/19/2020  [employer] Patient Education © 2021 [employer]

## 2021-07-13 NOTE — ASSESSMENT & PLAN NOTE
Psychological condition is worsening.  Referral to psychological counseling.  Psychological condition  will be reassessed at the next regular appointment.

## 2021-07-15 VITALS
HEART RATE: 83 BPM | OXYGEN SATURATION: 99 % | DIASTOLIC BLOOD PRESSURE: 102 MMHG | SYSTOLIC BLOOD PRESSURE: 196 MMHG | HEIGHT: 66 IN | TEMPERATURE: 98.1 F | BODY MASS INDEX: 31.02 KG/M2 | WEIGHT: 193 LBS

## 2021-07-20 ENCOUNTER — OFFICE VISIT (OUTPATIENT)
Dept: PSYCHIATRY | Facility: CLINIC | Age: 56
End: 2021-07-20

## 2021-07-20 VITALS
HEIGHT: 67 IN | WEIGHT: 195 LBS | SYSTOLIC BLOOD PRESSURE: 134 MMHG | DIASTOLIC BLOOD PRESSURE: 76 MMHG | BODY MASS INDEX: 30.61 KG/M2

## 2021-07-20 DIAGNOSIS — F41.1 GENERALIZED ANXIETY DISORDER: Primary | ICD-10-CM

## 2021-07-20 DIAGNOSIS — F33.1 MAJOR DEPRESSIVE DISORDER, RECURRENT EPISODE, MODERATE (HCC): ICD-10-CM

## 2021-07-20 PROCEDURE — 90792 PSYCH DIAG EVAL W/MED SRVCS: CPT | Performed by: NURSE PRACTITIONER

## 2021-07-20 NOTE — PATIENT INSTRUCTIONS
1.  Please return to clinic at your next scheduled visit.  Contact the clinic ([phone]) at least 24 hours prior in the event you need to cancel.  2.  Do no harm to yourself or others.    3.  Avoid alcohol and drugs.    4.  Take all medications as prescribed.  Please contact the clinic with any concerns. If you are in need of medication refills, please call the clinic at [phone].    5. Should you want to get in touch with your provider, Dr. Latricia Rodriguez, please utilize [employer] or contact the office ([phone]), and staff will be able to page Dr. Rodriguez directly.  6.  In the event you have personal crisis, contact the following crisis numbers: Suicide Prevention Hotline 1-683.897.2169; ANDREA Helpline 7-184-400-TBZU; [facility] Emergency Room [phone]; text HELLO to 487855; or 118.     SPECIFIC RECOMMENDATIONS:     1.      Medications discussed at this encounter: Rexulti 0.5 mg tablet by mouth daily for 14 days, then 1mg by mouth daily thereafter for 7 days; 2 sample packs given, 1 with only 0.5mg tabs x7, 2nd is a starter pack with 7 days of 0.5 and 1mg for 7 days.                   -      2.      Psychotherapy recommendations: Therapy list given to patient, patient to call to arrange appt      3.     Return to clinic: 3 weeks      Counseling Services    Amparo Hernandez  Address: [address], [place] [address]  Phone: [phone]  Website: [email or web address]  Services offered: Family, couples, and individual therapy. Clients ages 11 and up are seen for issues such as grief, low self-worth, adjustments, anxiety, trauma, marital and premarital, family, divorce, social skills, assertiveness skills, communication and conflict resolution skills.  Insurance accepted: [place], Humana, Cigna, Aetna, Optum, EAPs, , self pay.    [facility]  Address: [address], [place] [address]  Phone: [phone]  Website: [email or web address]  Services offered: Individual counseling for mood and anxiety disorders, PTSD and other trauma related issues, and substance-related disorders. Group counseling for anger management, domestic violence, parenting/parenting in recovery, and chemical dependence/substance abuse.  Insurance accepted: [employer], Cigna, Aetna, , UMR, United. Accept all Medicaid, except for Aetna [facility] and [place] Medicaid.     [facility]  Address: [address] [address]  Phone: [phone]  Website: [email or web address]  Services offered: Family, couples, and individual therapy for a wide variety of areas, such as anger management, LGBTQ support, crisis and conflict management, anxiety, depression, relationships, mood disorders, and more.  Insurance accepted: Medicaid, Medicare, and most commercial health insurances.    [facility]  Address: [address], [facility] [address]  Phone: [phone]  Other location: [address], [facility] [address]   Services offered: Family, couples, and individual therapy for a wide variety of areas, such as mood disorders, personality disorders, psychosis, addiction, anxiety, coping skills, grief, trauma and PTSD, transgender, self-harming, suicidal ideation, and other. Multiple types of therapy offered, including dialectical, trauma focused, and more.  Insurance accepted: [employer] BlueCross and [place], Caresouleymane, Humana, Medicaid, WellCare, Out of Network.    [facility]  Address: [address], [place] [address]  Phone: [phone]  Website: [email or web address]  Services offered: Individual and couples therapy, along with veterans group therapy and equine assisted psychotherapy. Providing service for depression, trauma, anxiety, abuse, phase of life changes, and more.  Insurance  accepted: Emm[place], [facility] and [facility], [place] EAP, [facility]  and EAP Services, Cigna,  One Source, Optum/United Healthcare/UMR, .    [facility]  Address: [address], [facility] [address] (office is rear entrance on [address]).  -Alicia London [phone]  -Aileen Young LCSW. [phone]  Website: [email or web address]  Services offered: Psychotherapy and counseling for a wide variety of areas, such as depression, OCD, PTSD, and more.  Insurance accepted: Accepts most insurance, including Medicare and Medicaid.      [facility]  Address: [address] [address]  Phone: [phone]  Website: [email or web address]  Services offered: Individual, family, and group therapy. Also provide maternal mental health support.   Insurance accepted: [facility], Optum United, Cigna, Passport Medicaid, CareSource, Humana / [place].    [facility]  Address: [address], [facility] [address]  Phone: [phone]  Website: [email or web address] [email or web address]  Services offered: Counseling for anxiety, depression, trauma, guidance on mindfulness and meditation, and more.  Insurance accepted: Accepts most major insurance plans, health savings accounts, and private pay. Documentation can be provided for you to submit to your insurance company for possible reimbursement.    [facility]  Address: [address] [address]  Other location: [address] [id number]  Phone: [phone] (also 24 hour crisis line)  Website: [email or web address]  Services offered: Short-term individual and family therapy with victims and their non-offending caregivers, and more with the goal to reduce the trauma experienced by victims of sexual abuse and sexual assault.   Insurance offered: Non-profit  organization - all services are free.    [facility]  Phone: [phone] (also 24 hour crisis line)  Website: [email or web address]  Services offered: Provides a variety of services to anyone experiencing spouse/partner violence to victims and their dependent children.   Insurance offered: Non-profit organization - all services are free.    [facility]   Address: [address] [id number]  Phone: [phone]  Other location: [address] [place], [place] [address]. [phone]  Website: [email or web address]  Services offered: Individual, group, and family counseling for adult behavioral health.   Insurance accepted: Medicaid, Medicare, and major insurance companies. Sliding scale available.    [facility]  Address: [address] [place], [place] [address]  Phone: [phone]  Website: [email or web address]  Services offered: Family, couples, therapeutic groups, and individual therapy for a wide variety of areas, such as anxiety, ADHD, grief and loss, depression, bipolar disorder, PTSD, trauma, anger management, self harm, and more.  Insurance offered: [place], , Humana, Aetna, [facility]/Optum, Cigna, [facility], [employer], UMR, [employer], Medicare, Medicaid, Passport, Humana Care Source.
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
## 2021-07-22 ENCOUNTER — CLINICAL SUPPORT (OUTPATIENT)
Dept: FAMILY MEDICINE CLINIC | Facility: CLINIC | Age: 56
End: 2021-07-22

## 2021-07-22 DIAGNOSIS — E53.8 VITAMIN B12 DEFICIENCY: ICD-10-CM

## 2021-07-22 PROCEDURE — 96372 THER/PROPH/DIAG INJ SC/IM: CPT | Performed by: NURSE PRACTITIONER

## 2021-07-22 RX ADMIN — CYANOCOBALAMIN 1000 MCG: 1000 INJECTION, SOLUTION INTRAMUSCULAR; SUBCUTANEOUS at 10:06

## 2021-08-10 ENCOUNTER — TELEMEDICINE (OUTPATIENT)
Dept: PSYCHIATRY | Facility: CLINIC | Age: 56
End: 2021-08-10

## 2021-08-10 DIAGNOSIS — F41.1 GENERALIZED ANXIETY DISORDER: Primary | ICD-10-CM

## 2021-08-10 DIAGNOSIS — F33.1 MAJOR DEPRESSIVE DISORDER, RECURRENT EPISODE, MODERATE (HCC): ICD-10-CM

## 2021-08-10 PROCEDURE — 99213 OFFICE O/P EST LOW 20 MIN: CPT | Performed by: NURSE PRACTITIONER

## 2021-08-10 NOTE — PATIENT INSTRUCTIONS
1.  Please return to clinic at your next scheduled visit.  Contact the clinic ([phone]) at least 24 hours prior in the event you need to cancel.  2.  Do no harm to yourself or others.    3.  Avoid alcohol and drugs.    4.  Take all medications as prescribed.  Please contact the clinic with any concerns. If you are in need of medication refills, please call the clinic at [phone].    5. Should you want to get in touch with your provider, Dr. Latricia Rodriguez, please utilize [email or web address] or contact the office ([phone]), and staff will be able to page Dr. Rodriguez directly.  6.  In the event you have personal crisis, contact the following crisis numbers: Suicide Prevention Hotline 1-349.899.2912; [facility] Helpline 4-971-900-VIRI; [facility] Emergency Room [phone]; text HELLO to 410435; or 030.     SPECIFIC RECOMMENDATIONS:     1.      Medications discussed at this encounter: Discontinued Rexulti due to adverse reaction described by patient and daughter.  Reiterated following hydroxyzine instructions on bottle to take 50 mg one tablet at night to help sleep as previously prescribed.                   -      2.      Psychotherapy recommendations: Continue to seek therapist/counselor, set up appt before next visit     3.     Return to clinic: 2 weeks

## 2021-08-10 NOTE — PROGRESS NOTES
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 of prescribed medication as well as the risks, benefits, and side effects including potential falls, possible impaired driving and metabolic adversities among others. Patient is agreeable to call the office with any worsening of symptoms or onset of side effects. Patient is agreeable to call 911 or go to the nearest ER should he/she begin having SI/HI. No medication side effects or related complaints today.     MEDS ORDERED DURING VISIT:  No orders of the defined types were placed in this encounter.      Return in about 2 weeks (around 8/24/2021) for Video visit.      I spent 31 minutes caring for Esme on this date of service. This time includes time spent by me in the following activities: preparing for the visit, reviewing tests, performing a medically appropriate examination and/or evaluation, counseling and educating the patient/family/caregiver, referring and communicating with other health care professionals, documenting information in the medical record and care coordination.     This document has been electronically signed by ROSA Quan  August 10, 2021 15:22 EDT      Part of this note may be an electronic transcription/translation of spoken language to printed text using the Dragon Dictation System.

## 2021-08-16 ENCOUNTER — CLINICAL SUPPORT (OUTPATIENT)
Dept: FAMILY MEDICINE CLINIC | Facility: CLINIC | Age: 56
End: 2021-08-16

## 2021-08-16 DIAGNOSIS — E53.8 VITAMIN B12 DEFICIENCY: ICD-10-CM

## 2021-08-16 PROCEDURE — 96372 THER/PROPH/DIAG INJ SC/IM: CPT | Performed by: NURSE PRACTITIONER

## 2021-08-16 RX ADMIN — CYANOCOBALAMIN 1000 MCG: 1000 INJECTION, SOLUTION INTRAMUSCULAR; SUBCUTANEOUS at 10:30

## 2021-08-30 ENCOUNTER — CLINICAL SUPPORT (OUTPATIENT)
Dept: FAMILY MEDICINE CLINIC | Facility: CLINIC | Age: 56
End: 2021-08-30

## 2021-08-30 DIAGNOSIS — E55.9 VITAMIN D DEFICIENCY: ICD-10-CM

## 2021-08-30 DIAGNOSIS — E53.8 VITAMIN B12 DEFICIENCY: ICD-10-CM

## 2021-08-30 PROCEDURE — 96372 THER/PROPH/DIAG INJ SC/IM: CPT | Performed by: NURSE PRACTITIONER

## 2021-08-30 RX ADMIN — CYANOCOBALAMIN 1000 MCG: 1000 INJECTION, SOLUTION INTRAMUSCULAR; SUBCUTANEOUS at 11:00

## 2021-09-15 ENCOUNTER — CLINICAL SUPPORT (OUTPATIENT)
Dept: FAMILY MEDICINE CLINIC | Facility: CLINIC | Age: 56
End: 2021-09-15

## 2021-09-15 DIAGNOSIS — E53.8 VITAMIN B12 DEFICIENCY: Primary | ICD-10-CM

## 2021-09-15 PROCEDURE — 96372 THER/PROPH/DIAG INJ SC/IM: CPT | Performed by: NURSE PRACTITIONER

## 2021-09-15 RX ORDER — CYANOCOBALAMIN 1000 UG/ML
1000 INJECTION, SOLUTION INTRAMUSCULAR; SUBCUTANEOUS
Status: DISCONTINUED | OUTPATIENT
Start: 2021-09-15 | End: 2022-02-22

## 2021-09-15 RX ORDER — EPINEPHRINE 0.3 MG/.3ML
0.3 INJECTION SUBCUTANEOUS SEE ADMIN INSTRUCTIONS
Qty: 1 EACH | Refills: 2 | Status: SHIPPED | OUTPATIENT
Start: 2021-09-15 | End: 2021-11-30 | Stop reason: SDUPTHER

## 2021-09-15 RX ADMIN — CYANOCOBALAMIN 1000 MCG: 1000 INJECTION, SOLUTION INTRAMUSCULAR; SUBCUTANEOUS at 09:20

## 2021-09-30 ENCOUNTER — CLINICAL SUPPORT (OUTPATIENT)
Dept: FAMILY MEDICINE CLINIC | Facility: CLINIC | Age: 56
End: 2021-09-30

## 2021-09-30 DIAGNOSIS — E53.8 VITAMIN B12 DEFICIENCY: ICD-10-CM

## 2021-09-30 PROCEDURE — 96372 THER/PROPH/DIAG INJ SC/IM: CPT | Performed by: NURSE PRACTITIONER

## 2021-09-30 RX ORDER — OMEPRAZOLE 40 MG/1
40 CAPSULE, DELAYED RELEASE ORAL DAILY
Qty: 30 CAPSULE | Refills: 0 | Status: SHIPPED | OUTPATIENT
Start: 2021-09-30 | End: 2021-11-05

## 2021-09-30 RX ORDER — LEVOCETIRIZINE DIHYDROCHLORIDE 5 MG/1
5 TABLET, FILM COATED ORAL DAILY
Qty: 30 TABLET | Refills: 0 | Status: SHIPPED | OUTPATIENT
Start: 2021-09-30 | End: 2021-11-05

## 2021-09-30 RX ORDER — MONTELUKAST SODIUM 10 MG/1
10 TABLET ORAL DAILY
Qty: 30 TABLET | Refills: 0 | Status: SHIPPED | OUTPATIENT
Start: 2021-09-30 | End: 2021-11-05

## 2021-09-30 RX ORDER — VALACYCLOVIR HYDROCHLORIDE 1 G/1
1000 TABLET, FILM COATED ORAL DAILY
Qty: 30 TABLET | Refills: 0 | Status: SHIPPED | OUTPATIENT
Start: 2021-09-30 | End: 2022-01-18 | Stop reason: SDUPTHER

## 2021-09-30 RX ADMIN — CYANOCOBALAMIN 1000 MCG: 1000 INJECTION, SOLUTION INTRAMUSCULAR; SUBCUTANEOUS at 10:06

## 2021-10-15 ENCOUNTER — CLINICAL SUPPORT (OUTPATIENT)
Dept: FAMILY MEDICINE CLINIC | Facility: CLINIC | Age: 56
End: 2021-10-15

## 2021-10-15 DIAGNOSIS — E53.8 B12 DEFICIENCY: ICD-10-CM

## 2021-10-15 PROCEDURE — 96372 THER/PROPH/DIAG INJ SC/IM: CPT | Performed by: NURSE PRACTITIONER

## 2021-10-15 RX ADMIN — CYANOCOBALAMIN 1000 MCG: 1000 INJECTION, SOLUTION INTRAMUSCULAR; SUBCUTANEOUS at 09:36

## 2021-10-25 ENCOUNTER — OFFICE VISIT (OUTPATIENT)
Dept: OBSTETRICS AND GYNECOLOGY | Facility: CLINIC | Age: 56
End: 2021-10-25

## 2021-10-25 VITALS
BODY MASS INDEX: 34.49 KG/M2 | HEART RATE: 79 BPM | DIASTOLIC BLOOD PRESSURE: 92 MMHG | WEIGHT: 202 LBS | SYSTOLIC BLOOD PRESSURE: 159 MMHG | HEIGHT: 64 IN

## 2021-10-25 DIAGNOSIS — I10 ESSENTIAL HYPERTENSION: ICD-10-CM

## 2021-10-25 DIAGNOSIS — Z01.419 ENCOUNTER FOR GYNECOLOGICAL EXAMINATION WITHOUT ABNORMAL FINDING: Primary | ICD-10-CM

## 2021-10-25 DIAGNOSIS — A60.04 HERPES SIMPLEX VULVOVAGINITIS: ICD-10-CM

## 2021-10-25 LAB
GLUCOSE UR STRIP-MCNC: NEGATIVE MG/DL
PROT UR STRIP-MCNC: NEGATIVE MG/DL

## 2021-10-25 PROCEDURE — 3008F BODY MASS INDEX DOCD: CPT | Performed by: NURSE PRACTITIONER

## 2021-10-25 PROCEDURE — G0123 SCREEN CERV/VAG THIN LAYER: HCPCS | Performed by: NURSE PRACTITIONER

## 2021-10-25 PROCEDURE — G0101 CA SCREEN;PELVIC/BREAST EXAM: HCPCS | Performed by: NURSE PRACTITIONER

## 2021-10-25 PROCEDURE — 88175 CYTOPATH C/V AUTO FLUID REDO: CPT | Performed by: NURSE PRACTITIONER

## 2021-10-25 PROCEDURE — 81002 URINALYSIS NONAUTO W/O SCOPE: CPT | Performed by: NURSE PRACTITIONER

## 2021-10-25 PROCEDURE — 2014F MENTAL STATUS ASSESS: CPT | Performed by: NURSE PRACTITIONER

## 2021-10-25 PROCEDURE — 87624 HPV HI-RISK TYP POOLED RSLT: CPT | Performed by: NURSE PRACTITIONER

## 2021-10-25 RX ORDER — DICYCLOMINE HYDROCHLORIDE 10 MG/1
10 CAPSULE ORAL 3 TIMES DAILY
COMMUNITY
Start: 2021-08-25 | End: 2022-01-18 | Stop reason: SDUPTHER

## 2021-10-25 RX ORDER — VALACYCLOVIR HYDROCHLORIDE 1 G/1
1000 TABLET, FILM COATED ORAL DAILY
Qty: 90 TABLET | Refills: 3 | Status: SHIPPED | OUTPATIENT
Start: 2021-10-25 | End: 2021-10-26

## 2021-10-25 NOTE — PROGRESS NOTES
"Chief Complaint  Follow-up and B12 Injection    Subjective          Esem Mcneil presents to [facility]  History of Present Illness  She presents today for 3-month follow-up.    Hypertension: Her blood pressure previously had been elevated, uncontrolled.  She is currently on clonidine 0.2 mg twice daily.  She has multiple allergies to medications.  I had prescribed her hydrochlorothiazide 12.5 mg once daily on her last visit but she is not taking it now states it gave her a \"headache.\"    Severe recurrent major depression: She has been following with a mental health provider.  She stopped taking Trintellix because she thinks it is causing her tardive dyskinesea.  She also takes Atarax 50 mg 3 times daily. She is complaining of hearing a humming in her head lately. She states she is not going back to psychiatry because they are only wanting her to do therapy.    She has a history of anemia, both iron deficiency and vitamin B12 deficiency.  She states she cannot tolerate p.o. iron because it does not absorb due to her history of gastric bypass.  She is not due for a vitamin B12 injection today.  Objective   Vital Signs:   /92   Pulse 69   Temp 98.5 °F (36.9 °C)   Ht 162.6 cm (64\")   Wt 93.1 kg (205 lb 3.2 oz)   SpO2 100%   BMI 35.22 kg/m²     Physical Exam  Vitals reviewed.   Constitutional:       Appearance: Normal appearance. She is well-developed.   Neck:      Thyroid: No thyroid mass, thyromegaly or thyroid tenderness.   Cardiovascular:      Rate and Rhythm: Normal rate and regular rhythm.      Heart sounds: No murmur heard.  No friction rub. No gallop.    Pulmonary:      Effort: Pulmonary effort is normal.      Breath sounds: Normal breath sounds. No wheezing or rhonchi.   Lymphadenopathy:      Cervical: No cervical adenopathy.   Skin:     General: Skin is warm and dry.   Neurological:      Mental Status: She is alert and oriented to person, place, and time.    "   Cranial Nerves: No cranial nerve deficit.   Psychiatric:         Mood and Affect: Mood is depressed. Affect is blunt.         Behavior: Behavior normal.         Thought Content: Thought content normal. Thought content does not include homicidal or suicidal ideation.         Judgment: Judgment is inappropriate.        Result Review :                 Assessment and Plan    Diagnoses and all orders for this visit:    1. Essential hypertension (Primary)  Assessment & Plan:  Hypertension is worsening.  Continue current treatment regimen.  Patient is noncompliant with taking any new medications.  I will refer her to cardiology for further work-up and treatment of uncontrolled hypertension.    Blood pressure will be reassessed at the next regular appointment.    Orders:  -     Ambulatory Referral to Cardiology    2. Anemia, unspecified type  Assessment & Plan:  Chronic anemia, unable to tolerate iron due to history of gastric bypass surgery.  I will refer her to hematology for evaluation and treatment of her anemia.  We discussed she may need iron infusions.    Orders:  -     Ambulatory Referral to Hematology / Oncology    3. Severe episode of recurrent major depressive disorder, without psychotic features (HCC)  Assessment & Plan:  Patient's depression is single episode and is severe without psychosis. Their depression is currently active and the condition is worsening.  She has multiple allergies to medications and is being noncompliant with her current treatment.  This will be reassessed at the next regular appointment. F/U as described:Instructed patient that she has to find her another psychiatrist if she does not want to go to the scene when she was seeing.      Other orders  -     Cancel: FluLaval/Fluarix/Fluzone >6 Months ([id number])  Patient had wanted a flu shot today but she left before the flu shot was able to be given.    Follow Up   Return in about 6 months (around 4/26/2022) for Next scheduled follow  up.  Patient was given instructions and counseling regarding her condition or for health maintenance advice. Please see specific information pulled into the AVS if appropriate.

## 2021-10-25 NOTE — PROGRESS NOTES
HPI:   56 y.o..Presents for well woman exam. Contraception or HRT: Post menopausal  Menses: NO VAGINAL BLEEDING  Pain:  None  Last pap abnormal, ASCUS hpv+, HISTORY OF GENITAL HERPES, DESIRES RF ACYCLOVIR  Pt has no complaints today.      Past Medical History:   Diagnosis Date   • Acid reflux disease    • ADHD    • Alcoholism in remission ([facility])    • Allergic rhinitis due to allergen 08/15/2017   • Anemia    • Anxiety disorder 2017   • Asthma 08/15/2017   • Broken bones    • Congestive heart failure (CHF) ([facility])    • Constipation    • Depression 2017   • Diabetes ([facility])    • Essential hypertension 2017   • Gall stones    • Genital herpes 08/15/2017   • GERD (gastroesophageal reflux disease) 2020    Discussed symptoms with patient. Encouraged patient to add Famotidine to current med regimen rather than Bentyl. Patient will follow-up in 3 mos and we will see if Famotidine helped with her symptoms. Patient understood and agreed with plan.    • Head injury    • Heart attack ([facility])    • Heart disease    • Heart murmur    • Hemorrhoids    • History of gastric bypass 2021   • History of heart surgery 2021   • History of LAD (lymphadenopathy) 2021    coronary artery thrombosis   • HPV (human papilloma virus) infection 2020   • Hyperlipidemia 2020   • Hypertension    • IBS (irritable bowel syndrome) 2020    discussed risks of long term bentyl. Will see if sx improve with better tx of GERD sx. pt not currently having diarrhea or constipation, bloating or pain.    • Kidney stones    • Lymphedema of both lower extremities 2021   • Major depressive disorder 2021   • Migraine headache    • Night sweats    • Obsessive-compulsive disorder    • Pap smear abnormality of cervix 2021   • Post traumatic stress disorder    • Seizure ([facility])    • Self-injurious behavior    • Shortness of breath    • Sinus trouble    • STD exposure    • Substance abuse ([facility])     • Suicide attempt (HCC)    • Vitamin B12 deficiency 2021      Past Surgical History:   Procedure Laterality Date   • ABDOMINOPLASTY     • ADENOIDECTOMY     • APPENDECTOMY     • CARPAL TUNNEL RELEASE Bilateral     1992 right wrist-  left hand- left hand 2016   •  SECTION     • CHOLECYSTECTOMY     • COLONOSCOPY  2018    cologard   • GASTRIC BYPASS      rour-en-y   • OTHER SURGICAL HISTORY      excision of uvula   • OTHER SURGICAL HISTORY      tubes and overy removal - removal of skin on arms     • OTHER SURGICAL HISTORY  2013    stent placement   • TONSILLECTOMY     • TUMOR REMOVAL       &  fatty      Family History   Problem Relation Age of Onset   • Coronary artery disease Mother    • Alcohol abuse Mother    • Anxiety disorder Mother    • Depression Mother    • Prostate cancer Father    • Alcohol abuse Father    • Depression Father    • Coronary artery disease Brother    • Diabetes Brother    • Alcohol abuse Brother    • Anxiety disorder Brother    • Depression Brother    • Stroke Maternal Grandmother    • Coronary artery disease Maternal Grandmother    • Coronary artery disease Maternal Grandfather    • Stroke Paternal Grandmother    • Coronary artery disease Paternal Grandmother    • Coronary artery disease Paternal Grandfather    • Alcohol abuse Paternal Grandfather    • Heart disease Other    • Schizophrenia Other    • Coronary artery disease Other    • Ovarian cancer Other    • Coronary artery disease Other    • Diabetes Daughter    • ADD / ADHD Daughter    • Alcohol abuse Daughter    • Anxiety disorder Daughter    • Depression Daughter    • Drug abuse Daughter    • Self-Injurious Behavior  Daughter    • Suicide Attempts Daughter    • Alcohol abuse Maternal Aunt    • Alcohol abuse Paternal Aunt    • Alcohol abuse Maternal Uncle    • Dementia Maternal Uncle    • Schizophrenia Maternal Uncle    • Alcohol abuse Paternal Uncle      "    PCP: does manage PMHx and preventative labs      PHYSICAL EXAM:  /92   Pulse 79   Ht 162.6 cm (64\")   Wt 91.6 kg (202 lb)   BMI 34.67 kg/m²   General- NAD, alert and oriented, appropriate  Psych- Normal mood, good memory  Neck- No masses, no thyroid enlargement  Lymphatic- No palpable neck, axillary, or groin nodes  CV- Regular rhythm, no murnurs  Resp- CTA to bases, no wheezes  Abdomen- Soft, non distended, non tender, no masses  Breast left-  Bilaterally symmetrical, no masses, non tender, no nipple discharge  Breast right- Bilaterally symmetrical, no masses, non tender, no nipple discharge  External genitalia- Normal female, no lesions  Urethra/meatus- Normal, no masses, non tender, no prolapse  Bladder- Normal, no masses, non tender, no prolapse  Vagina- Normal, no atrophy, no lesions, no discharge, no prolapse  Cvx- Normal, no lesions, no discharge, No cervical motion tenderness  Uterus- Normal size, shape & consistency.  Non tender, mobile, & no prolapse  Adnexa- No mass, non tender  Anus/Rectum/Perineum- Not performed  Ext- No edema, no cyanosis    Skin- No lesions, no rashes, no acanthosis nigricans        ASSESSMENT and PLAN:  WWE    Diagnoses and all orders for this visit:    1. Encounter for gynecological examination without abnormal finding (Primary)  -     POC Urinalysis Dipstick  -     IGP,rfx Aptima HPV All Pth  -     HPV DNA Probe, Direct - ThinPrep Vial, Cervix, Endocervix    2. Essential hypertension    3. Herpes simplex vulvovaginitis    Other orders  -     valACYclovir (Valtrex) 1000 MG tablet; Take 1 tablet by mouth Daily for 90 days.  Dispense: 90 tablet; Refill: 3       Glucose, UA   Date Value Ref Range Status   10/25/2021 Negative Negative, 1000 mg/dL (3+) mg/dL Final      Protein, POC   Date Value Ref Range Status   10/25/2021 Negative Negative mg/dL Final            Preventative:   BREAST HEALTH- Monthly self breast exam importance and how to reviewed. MMG and/or MRI (prn) " reviewed per society guidelines and her individual history. Screen: Updated today.  CERVICAL CANCER Screening- Reviewed current ASCCP guidelines for screening w and wo cotest HPV, age specific.  Screen: Updated today.  COLON CANCER Screening- Reviewed current medical society guidelines and options.  Screen:  Already up to date.  Follow up PCP/Specialist PMHx and Labs      She understands the importance of having any ordered tests to be performed in a timely fashion.  The risks of not performing them include, but are not limited to, advanced cancer stages, bone loss from osteoporosis and/or subsequent increase in morbidity and/or mortality.  She is encouraged to review her results online and/or contact or office if she has questions.     Follow Up:  Return for AS NEEDED.,       Emily Weller, APRN  10/25/2021

## 2021-10-26 ENCOUNTER — OFFICE VISIT (OUTPATIENT)
Dept: FAMILY MEDICINE CLINIC | Facility: CLINIC | Age: 56
End: 2021-10-26

## 2021-10-26 VITALS
OXYGEN SATURATION: 100 % | HEIGHT: 64 IN | SYSTOLIC BLOOD PRESSURE: 160 MMHG | HEART RATE: 69 BPM | TEMPERATURE: 98.5 F | BODY MASS INDEX: 35.03 KG/M2 | DIASTOLIC BLOOD PRESSURE: 92 MMHG | WEIGHT: 205.2 LBS

## 2021-10-26 DIAGNOSIS — F33.2 SEVERE EPISODE OF RECURRENT MAJOR DEPRESSIVE DISORDER, WITHOUT PSYCHOTIC FEATURES (HCC): ICD-10-CM

## 2021-10-26 DIAGNOSIS — I10 ESSENTIAL HYPERTENSION: Primary | ICD-10-CM

## 2021-10-26 DIAGNOSIS — D64.9 ANEMIA, UNSPECIFIED TYPE: ICD-10-CM

## 2021-10-26 PROCEDURE — 99214 OFFICE O/P EST MOD 30 MIN: CPT | Performed by: NURSE PRACTITIONER

## 2021-10-26 NOTE — ASSESSMENT & PLAN NOTE
Patient's depression is single episode and is severe without psychosis. Their depression is currently active and the condition is worsening.  She has multiple allergies to medications and is being noncompliant with her current treatment.  This will be reassessed at the next regular appointment. F/U as described:Instructed patient that she has to find her another psychiatrist if she does not want to go to the scene when she was seeing.

## 2021-10-26 NOTE — ASSESSMENT & PLAN NOTE
Hypertension is worsening.  Continue current treatment regimen.  Patient is noncompliant with taking any new medications.  I will refer her to cardiology for further work-up and treatment of uncontrolled hypertension.    Blood pressure will be reassessed at the next regular appointment.

## 2021-10-26 NOTE — ASSESSMENT & PLAN NOTE
Chronic anemia, unable to tolerate iron due to history of gastric bypass surgery.  I will refer her to hematology for evaluation and treatment of her anemia.  We discussed she may need iron infusions.

## 2021-10-29 ENCOUNTER — CLINICAL SUPPORT (OUTPATIENT)
Dept: FAMILY MEDICINE CLINIC | Facility: CLINIC | Age: 56
End: 2021-10-29

## 2021-10-29 ENCOUNTER — OFFICE VISIT (OUTPATIENT)
Dept: CARDIOLOGY | Facility: CLINIC | Age: 56
End: 2021-10-29

## 2021-10-29 VITALS
BODY MASS INDEX: 35.68 KG/M2 | HEART RATE: 78 BPM | SYSTOLIC BLOOD PRESSURE: 182 MMHG | WEIGHT: 209 LBS | DIASTOLIC BLOOD PRESSURE: 82 MMHG | HEIGHT: 64 IN

## 2021-10-29 DIAGNOSIS — E78.5 HYPERLIPIDEMIA, UNSPECIFIED HYPERLIPIDEMIA TYPE: ICD-10-CM

## 2021-10-29 DIAGNOSIS — E53.8 VITAMIN B12 DEFICIENCY: ICD-10-CM

## 2021-10-29 DIAGNOSIS — I24.0: ICD-10-CM

## 2021-10-29 DIAGNOSIS — I10 ESSENTIAL HYPERTENSION: ICD-10-CM

## 2021-10-29 DIAGNOSIS — I25.10 CORONARY ARTERY DISEASE INVOLVING NATIVE CORONARY ARTERY OF NATIVE HEART WITHOUT ANGINA PECTORIS: ICD-10-CM

## 2021-10-29 DIAGNOSIS — I35.1 NONRHEUMATIC AORTIC VALVE INSUFFICIENCY: Primary | ICD-10-CM

## 2021-10-29 PROCEDURE — 99204 OFFICE O/P NEW MOD 45 MIN: CPT | Performed by: INTERNAL MEDICINE

## 2021-10-29 PROCEDURE — 96372 THER/PROPH/DIAG INJ SC/IM: CPT | Performed by: NURSE PRACTITIONER

## 2021-10-29 RX ORDER — CLONIDINE HYDROCHLORIDE 0.1 MG/1
TABLET ORAL
Qty: 60 TABLET | Refills: 2 | Status: SHIPPED | OUTPATIENT
Start: 2021-10-29 | End: 2021-12-16 | Stop reason: SDUPTHER

## 2021-10-29 RX ORDER — SPIRONOLACTONE 25 MG/1
12.5 TABLET ORAL DAILY
Qty: 90 TABLET | Refills: 3 | Status: SHIPPED | OUTPATIENT
Start: 2021-10-29 | End: 2021-12-16

## 2021-10-29 RX ADMIN — CYANOCOBALAMIN 1000 MCG: 1000 INJECTION, SOLUTION INTRAMUSCULAR; SUBCUTANEOUS at 09:59

## 2021-10-29 NOTE — PROGRESS NOTES
Chief Complaint  Hypertension (Use to see Dr. Jenkins)    Subjective            Esme Mcneil presents to [facility]  History of present illness:    Patient is a 56-year-old female with past medical history significant for apparent stent in the LAD back in 2003.  He also sounds like she has had a murmur for some time and was told it was due to her aortic valve.  She states her last echocardiogram was 5 years ago.  She presents to our office due to blood pressure being elevated.  Her primary care physician apparently has tried multiple different blood pressure medications and she has significant side effects and stopped them.  She notes no chest pain.      Past Medical History:   Diagnosis Date   • Acid reflux disease    • ADHD    • Alcoholism in remission ([facility])    • Allergic rhinitis due to allergen 08/15/2017   • Anemia    • Anxiety disorder 05/02/2017   • Asthma 08/15/2017   • Broken bones    • Congestive heart failure (CHF) ([facility])    • Constipation    • Depression 05/02/2017   • Diabetes ([facility])    • Essential hypertension 05/02/2017   • Gall stones    • Genital herpes 08/15/2017   • GERD (gastroesophageal reflux disease) 08/20/2020    Discussed symptoms with patient. Encouraged patient to add Famotidine to current med regimen rather than Bentyl. Patient will follow-up in 3 mos and we will see if Famotidine helped with her symptoms. Patient understood and agreed with plan.    • Head injury    • Heart attack ([facility])    • Heart disease    • Heart murmur    • Hemorrhoids    • History of gastric bypass 02/08/2021   • History of heart surgery 05/11/2021   • History of LAD (lymphadenopathy) 05/11/2021    coronary artery thrombosis   • HPV (human papilloma virus) infection 09/08/2020   • Hyperlipidemia 08/20/2020   • Hypertension    • IBS (irritable bowel syndrome) 08/20/2020    discussed risks of long term bentyl. Will see if sx improve with better tx of GERD sx. pt not currently having  "diarrhea or constipation, bloating or pain.    • Kidney stones    • Lymphedema of both lower extremities 2021   • Major depressive disorder 2021   • Migraine headache    • Night sweats    • Obsessive-compulsive disorder    • Pap smear abnormality of cervix 2021   • Post traumatic stress disorder    • Seizure (HCC)    • Self-injurious behavior    • Shortness of breath    • Sinus trouble    • STD exposure    • Substance abuse (HCC)    • Suicide attempt (HCC)    • Vitamin B12 deficiency 2021           Past Surgical History:   Procedure Laterality Date   • ABDOMINOPLASTY     • ADENOIDECTOMY     • APPENDECTOMY     • CARPAL TUNNEL RELEASE Bilateral     1992 right wrist-  left hand- left hand 2016   •  SECTION     • CHOLECYSTECTOMY     • COLONOSCOPY  2018    cologard   • CORONARY STENT PLACEMENT     • GASTRIC BYPASS      rour-en-y   • OTHER SURGICAL HISTORY      excision of uvula   • OTHER SURGICAL HISTORY      tubes and overy removal - removal of skin on arms     • OTHER SURGICAL HISTORY  2013    stent placement   • TONSILLECTOMY     • TUMOR REMOVAL       &  fatty          Social History     Socioeconomic History   • Marital status:    Tobacco Use   • Smoking status: Former Smoker     Packs/day: 2.00     Years: 20.00     Pack years: 40.00     Types: Cigarettes     Quit date:      Years since quittin.8   • Smokeless tobacco: Never Used   Vaping Use   • Vaping Use: Never used   Substance and Sexual Activity   • Alcohol use: Not Currently     Comment: former   • Drug use: Yes     Types: Marijuana     Comment: \"upper, downers, in betweeners, our coffe table was always full\"   • Sexual activity: Defer           Family History   Problem Relation Age of Onset   • Coronary artery disease Mother    • Alcohol abuse Mother    • Anxiety disorder Mother    • Depression Mother    • Prostate cancer Father    • Alcohol " "abuse Father    • Depression Father    • Coronary artery disease Brother    • Diabetes Brother    • Alcohol abuse Brother    • Anxiety disorder Brother    • Depression Brother    • Stroke Maternal Grandmother    • Coronary artery disease Maternal Grandmother    • Coronary artery disease Maternal Grandfather    • Stroke Paternal Grandmother    • Coronary artery disease Paternal Grandmother    • Coronary artery disease Paternal Grandfather    • Alcohol abuse Paternal Grandfather    • Heart disease Other    • Schizophrenia Other    • Coronary artery disease Other    • Ovarian cancer Other    • Coronary artery disease Other    • Diabetes Daughter    • ADD / ADHD Daughter    • Alcohol abuse Daughter    • Anxiety disorder Daughter    • Depression Daughter    • Drug abuse Daughter    • Self-Injurious Behavior  Daughter    • Suicide Attempts Daughter    • Alcohol abuse Maternal Aunt    • Alcohol abuse Paternal Aunt    • Alcohol abuse Maternal Uncle    • Dementia Maternal Uncle    • Schizophrenia Maternal Uncle    • Alcohol abuse Paternal Uncle             Allergies   Allergen Reactions   • Bee Venom Anaphylaxis   • Rexulti [Brexpiprazole] Other (See Comments)     Lethargic, difficulty walking and talking   • Zolpidem Mental Status Change and Other (See Comments)     \"IT MAKE ME ATTEMPT SUICIDE\"    • Amphetamine-Dextroamphetamine Mental Status Change   • Duloxetine Hcl Headache   • Lexapro [Escitalopram] Mental Status Change   • Losartan Nausea Only and Other (See Comments)     Blood pressure gets too low   • Nefazodone Other (See Comments)   • Olanzapine Dizziness   • Paxil [Paroxetine] Other (See Comments)     \"makes me feel numb\"   • Seroquel [Quetiapine] Other (See Comments)     \"makes me sleepy\" \"made me grumpy, I don't like it\"   • Trazodone Other (See Comments)     Heavily sedated day after taking medication   • Venlafaxine Mental Status Change   • Amlodipine GI Intolerance     UNKNOWN REACTION    • Ampicillin Rash " "  • Cephalexin Hives   • Codeine Itching   • Gabapentin Nausea Only   • Hydrocodone Itching and Other (See Comments)     \"pancreas issues\"   • Hydrocodone-Acetaminophen Itching   • Hydromorphone Nausea And Vomiting   • Ibuprofen Nausea And Vomiting   • Lisinopril Nausea Only   • Mirtazapine Other (See Comments)     \"MADE ME FEEL VERY LOOPY\"      • Oxycodone-Acetaminophen Itching   • Prednisone Other (See Comments)     \" CAUSED PANCREATITIS\"      • Sulfa Antibiotics Hives            Current Outpatient Medications:   •  albuterol sulfate  (90 Base) MCG/ACT inhaler, Inhale 2 puffs As Needed., Disp: , Rfl:   •  ascorbic acid (VITAMIN C) 500 MG tablet, Take 1 tablet by mouth Daily., Disp: , Rfl:   •  aspirin 81 MG EC tablet, Take 81 mg by mouth Daily., Disp: , Rfl:   •  caffeine 200 MG tablet, Take 1 tablet by mouth Daily., Disp: , Rfl:   •  cloNIDine (CATAPRES) 0.1 MG tablet, TAKE 2 TABLETS BY MOUTH EVERY NIGHT AT BEDTIME, Disp: 60 tablet, Rfl: 2  •  dicyclomine (BENTYL) 10 MG capsule, Take 10 mg by mouth 3 (Three) Times a Day., Disp: , Rfl:   •  EPINEPHrine (EPIPEN) 0.3 MG/0.3ML solution auto-injector injection, Inject 0.3 mL under the skin into the appropriate area as directed See Admin Instructions., Disp: 1 each, Rfl: 2  •  fluticasone (Flonase) 50 MCG/ACT nasal spray, 2 sprays into the nostril(s) as directed by provider Daily., Disp: 16 g, Rfl: 5  •  hydrOXYzine (ATARAX) 50 MG tablet, Take 1 tablet by mouth 3 (Three) Times a Day., Disp: , Rfl:   •  levocetirizine (XYZAL) 5 MG tablet, Take 1 tablet by mouth Daily., Disp: 30 tablet, Rfl: 0  •  montelukast (SINGULAIR) 10 MG tablet, Take 1 tablet by mouth Daily., Disp: 30 tablet, Rfl: 0  •  omeprazole (priLOSEC) 40 MG capsule, Take 1 capsule by mouth Daily., Disp: 30 capsule, Rfl: 0  •  valACYclovir (VALTREX) 1000 MG tablet, Take 1 tablet by mouth Daily., Disp: 30 tablet, Rfl: 0  •  spironolactone (ALDACTONE) 25 MG tablet, Take 0.5 tablets by mouth Daily., Disp: " "90 tablet, Rfl: 3    Current Facility-Administered Medications:   •  cyanocobalamin injection 1,000 mcg, 1,000 mcg, Intramuscular, Q14 Days, Priscilla Diallo, ROSA, 1,000 mcg at 10/29/21 0959      ROS:  Negative cardiac review of systems.    Objective     BP (!) 182/82 (Patient Position: Sitting)   Pulse 78   Ht 162.6 cm (64\")   Wt 94.8 kg (209 lb)   BMI 35.87 kg/m²       General Appearance:   · well developed  · well nourished  HENT:   · oropharynx moist  · lips not cyanotic  Respiratory:  · no respiratory distress  · normal breath sounds  · no rales  Cardiovascular:  · no jugular venous distention  · regular rhythm  · S1 normal, S2 normal  · no S3, no S4   · no murmur  · no rub, no thrill  · No carotid bruit  · pedal pulses normal  · lower extremity edema: none    Musculoskeletal:  · no clubbing of fingers.   · normocephalic, head atraumatic  Skin:   · warm, dry  Psychiatric:  · judgement and insight appropriate  · normal mood and affect          Procedures                      ASSESSMENT:  Encounter Diagnoses   Name Primary?   • Nonrheumatic aortic valve insufficiency Yes   • Essential hypertension    • Hyperlipidemia, unspecified hyperlipidemia type    • Left anterior descending (LAD) coronary artery thrombosis (HCC)          PLAN:    1.  Continue the 81 mg aspirin.  2.  Patient is not currently on a cholesterol pill.  We may have to consider this in the future.  3.  We will get an echocardiogram as it has been 5 years since evaluation of her aortic insufficiency.  4.  Patient has side effects to a number of medications.  I have asked her to try spironolactone 12.5 mg p.o. daily.  If she tolerates this she will get a BMP done in 1 week and will stop by our office in 2 weeks for just a blood pressure check.  I told her about the long term adverse effects of having untreated blood pressure on the heart and kidneys.  Did tell her to take spironolactone at night before she goes to bed.  The spironolactone " will still be in her system throughout the day where is the clonidine we would be worn off probably before she wakes up.  5.  Patient will call us if she has side effects on this medication.  I did tell her if there just mild side effects to try to give her body some time to get used to it.          Patient was given instructions and counseling regarding her condition or for health maintenance advice. Please see specific information pulled into the AVS if appropriate.         Guero Garcia MD   10/29/2021  14:45 EDT

## 2021-11-03 LAB
CONV .: ABNORMAL
CYTOLOGIST CVX/VAG CYTO: ABNORMAL
CYTOLOGY CVX/VAG DOC CYTO: ABNORMAL
CYTOLOGY CVX/VAG DOC THIN PREP: ABNORMAL
DX ICD CODE: ABNORMAL
DX ICD CODE: ABNORMAL
HIV 1 & 2 AB SER-IMP: ABNORMAL
HPV I/H RISK 4 DNA CVX QL PROBE+SIG AMP: NEGATIVE
OTHER STN SPEC: ABNORMAL
PATHOLOGIST CVX/VAG CYTO: ABNORMAL
STAT OF ADQ CVX/VAG CYTO-IMP: ABNORMAL

## 2021-11-04 ENCOUNTER — TELEPHONE (OUTPATIENT)
Dept: OBSTETRICS AND GYNECOLOGY | Facility: CLINIC | Age: 56
End: 2021-11-04

## 2021-11-04 NOTE — TELEPHONE ENCOUNTER
----- Message from ROSA Schaeffer sent at 11/4/2021  1:46 PM EDT -----  In follow up on pt questions regarding ASCUS HPV-, atrophy, Inform common to experience atrophy related to menopause, this will often be the source of ASCUS, guidelines recommend repeating PAP and HPV screening in 1 year.

## 2021-11-05 RX ORDER — LEVOCETIRIZINE DIHYDROCHLORIDE 5 MG/1
TABLET, FILM COATED ORAL
Qty: 30 TABLET | Refills: 0 | Status: SHIPPED | OUTPATIENT
Start: 2021-11-05 | End: 2021-12-01

## 2021-11-05 RX ORDER — OMEPRAZOLE 40 MG/1
CAPSULE, DELAYED RELEASE ORAL
Qty: 30 CAPSULE | Refills: 0 | Status: SHIPPED | OUTPATIENT
Start: 2021-11-05 | End: 2021-12-13

## 2021-11-05 RX ORDER — MONTELUKAST SODIUM 10 MG/1
TABLET ORAL
Qty: 30 TABLET | Refills: 0 | Status: SHIPPED | OUTPATIENT
Start: 2021-11-05 | End: 2021-12-13

## 2021-11-09 ENCOUNTER — LAB (OUTPATIENT)
Dept: LAB | Facility: HOSPITAL | Age: 56
End: 2021-11-09

## 2021-11-09 ENCOUNTER — TELEPHONE (OUTPATIENT)
Dept: CARDIOLOGY | Facility: CLINIC | Age: 56
End: 2021-11-09

## 2021-11-09 DIAGNOSIS — I10 ESSENTIAL HYPERTENSION: ICD-10-CM

## 2021-11-09 LAB
ANION GAP SERPL CALCULATED.3IONS-SCNC: 5.8 MMOL/L (ref 5–15)
BUN SERPL-MCNC: 23 MG/DL (ref 6–20)
BUN/CREAT SERPL: 39 (ref 7–25)
CALCIUM SPEC-SCNC: 8.9 MG/DL (ref 8.6–10.5)
CHLORIDE SERPL-SCNC: 109 MMOL/L (ref 98–107)
CO2 SERPL-SCNC: 27.2 MMOL/L (ref 22–29)
CREAT SERPL-MCNC: 0.59 MG/DL (ref 0.57–1)
GFR SERPL CREATININE-BSD FRML MDRD: 106 ML/MIN/1.73
GLUCOSE SERPL-MCNC: 85 MG/DL (ref 65–99)
POTASSIUM SERPL-SCNC: 4.1 MMOL/L (ref 3.5–5.2)
SODIUM SERPL-SCNC: 142 MMOL/L (ref 136–145)

## 2021-11-09 PROCEDURE — 80048 BASIC METABOLIC PNL TOTAL CA: CPT

## 2021-11-09 PROCEDURE — 36415 COLL VENOUS BLD VENIPUNCTURE: CPT

## 2021-11-09 NOTE — TELEPHONE ENCOUNTER
Patient called. She is a [facility] patient and called there but Neymar Gonzalez is not in today she called here for help. She had a urine sent and is looking for results. Having lots of pelvic pain and is miserable. Per Carlos Alberto's note in the computer she has a uti and Macrobid is to be prescribed.   Pt. Requests rx go to [facility] in [place] Patient came in the office today for B/P check.    187/74  78 pulse

## 2021-11-16 ENCOUNTER — CLINICAL SUPPORT (OUTPATIENT)
Dept: FAMILY MEDICINE CLINIC | Facility: CLINIC | Age: 56
End: 2021-11-16

## 2021-11-16 DIAGNOSIS — E53.8 VITAMIN B12 DEFICIENCY: ICD-10-CM

## 2021-11-16 PROCEDURE — 96372 THER/PROPH/DIAG INJ SC/IM: CPT | Performed by: NURSE PRACTITIONER

## 2021-11-16 RX ADMIN — CYANOCOBALAMIN 1000 MCG: 1000 INJECTION, SOLUTION INTRAMUSCULAR; SUBCUTANEOUS at 10:17

## 2021-11-17 ENCOUNTER — TELEPHONE (OUTPATIENT)
Dept: CARDIOLOGY | Facility: CLINIC | Age: 56
End: 2021-11-17

## 2021-11-17 NOTE — TELEPHONE ENCOUNTER
Did we call and let her know that her kidneys and potassium were fine on the BMP there were no abnormalities?

## 2021-11-17 NOTE — TELEPHONE ENCOUNTER
Patient called wanting results of her blood work.  I called patient to inform her that I would send Dr. Garcia a message.  That I would call her back as soon as Dr. Garcia reads her results.

## 2021-11-19 ENCOUNTER — TELEPHONE (OUTPATIENT)
Dept: CARDIOLOGY | Facility: CLINIC | Age: 56
End: 2021-11-19

## 2021-11-19 ENCOUNTER — TELEPHONE (OUTPATIENT)
Dept: PSYCHIATRY | Facility: CLINIC | Age: 56
End: 2021-11-19

## 2021-11-19 NOTE — TELEPHONE ENCOUNTER
Received VM from patient.    Returned call. Patient wanted to verify whether she had kidney disease. Advised there was no indication of kidney disease.

## 2021-11-19 NOTE — TELEPHONE ENCOUNTER
I have not seen this patient since August and only seen one  time, she will need to schedule an appointment. Thank you

## 2021-11-30 ENCOUNTER — CLINICAL SUPPORT (OUTPATIENT)
Dept: FAMILY MEDICINE CLINIC | Facility: CLINIC | Age: 56
End: 2021-11-30

## 2021-11-30 DIAGNOSIS — E53.8 VITAMIN B12 DEFICIENCY: ICD-10-CM

## 2021-11-30 PROCEDURE — 96372 THER/PROPH/DIAG INJ SC/IM: CPT | Performed by: NURSE PRACTITIONER

## 2021-11-30 RX ORDER — EPINEPHRINE 0.3 MG/.3ML
0.3 INJECTION SUBCUTANEOUS SEE ADMIN INSTRUCTIONS
Qty: 1 EACH | Refills: 1 | Status: SHIPPED | OUTPATIENT
Start: 2021-11-30 | End: 2022-01-18 | Stop reason: SDUPTHER

## 2021-11-30 RX ADMIN — CYANOCOBALAMIN 1000 MCG: 1000 INJECTION, SOLUTION INTRAMUSCULAR; SUBCUTANEOUS at 09:44

## 2021-12-01 RX ORDER — LEVOCETIRIZINE DIHYDROCHLORIDE 5 MG/1
TABLET, FILM COATED ORAL
Qty: 30 TABLET | Refills: 0 | Status: SHIPPED | OUTPATIENT
Start: 2021-12-01 | End: 2022-01-18 | Stop reason: SDUPTHER

## 2021-12-13 RX ORDER — MONTELUKAST SODIUM 10 MG/1
TABLET ORAL
Qty: 30 TABLET | Refills: 2 | Status: SHIPPED | OUTPATIENT
Start: 2021-12-13 | End: 2022-01-18 | Stop reason: SDUPTHER

## 2021-12-13 RX ORDER — OMEPRAZOLE 40 MG/1
CAPSULE, DELAYED RELEASE ORAL
Qty: 30 CAPSULE | Refills: 2 | Status: SHIPPED | OUTPATIENT
Start: 2021-12-13 | End: 2022-01-18 | Stop reason: SDUPTHER

## 2021-12-15 ENCOUNTER — CLINICAL SUPPORT (OUTPATIENT)
Dept: FAMILY MEDICINE CLINIC | Facility: CLINIC | Age: 56
End: 2021-12-15

## 2021-12-15 DIAGNOSIS — E53.8 VITAMIN B12 DEFICIENCY: ICD-10-CM

## 2021-12-15 PROCEDURE — 96372 THER/PROPH/DIAG INJ SC/IM: CPT | Performed by: NURSE PRACTITIONER

## 2021-12-15 RX ADMIN — CYANOCOBALAMIN 1000 MCG: 1000 INJECTION, SOLUTION INTRAMUSCULAR; SUBCUTANEOUS at 09:23

## 2021-12-16 ENCOUNTER — OFFICE VISIT (OUTPATIENT)
Dept: CARDIOLOGY | Facility: CLINIC | Age: 56
End: 2021-12-16

## 2021-12-16 VITALS
HEART RATE: 80 BPM | HEIGHT: 67 IN | BODY MASS INDEX: 33.12 KG/M2 | WEIGHT: 211 LBS | DIASTOLIC BLOOD PRESSURE: 90 MMHG | SYSTOLIC BLOOD PRESSURE: 153 MMHG

## 2021-12-16 DIAGNOSIS — I10 ESSENTIAL HYPERTENSION: ICD-10-CM

## 2021-12-16 DIAGNOSIS — I25.10 CORONARY ARTERY DISEASE INVOLVING NATIVE CORONARY ARTERY OF NATIVE HEART WITHOUT ANGINA PECTORIS: Primary | ICD-10-CM

## 2021-12-16 DIAGNOSIS — E78.2 HYPERLIPEMIA, MIXED: ICD-10-CM

## 2021-12-16 PROCEDURE — 99214 OFFICE O/P EST MOD 30 MIN: CPT | Performed by: INTERNAL MEDICINE

## 2021-12-16 RX ORDER — CLONIDINE HYDROCHLORIDE 0.1 MG/1
0.1 TABLET ORAL
Qty: 30 TABLET | Refills: 6 | Status: SHIPPED | OUTPATIENT
Start: 2021-12-16 | End: 2022-02-18

## 2021-12-16 RX ORDER — SPIRONOLACTONE 25 MG/1
25 TABLET ORAL DAILY
Qty: 90 TABLET | Refills: 3 | Status: SHIPPED | OUTPATIENT
Start: 2021-12-16 | End: 2021-12-30 | Stop reason: SDUPTHER

## 2021-12-16 NOTE — PROGRESS NOTES
Chief Complaint  Fatigue and Hypertension    Subjective            Esme Mcneil presents to [facility] CARDIOLOG[facility]  History of present illness:    Patient is overall doing okay.  She denies any chest pain.  She did not get the echocardiogram done yet as it is scheduled in January.  She is taking the half dose of the spironolactone during the day and 2 clonidine at night.  She does note fatigue.  They are apparently looking into giving her IV iron and she is getting vitamin B12 shots.      Past Medical History:   Diagnosis Date   • Acid reflux disease    • ADHD    • Alcoholism in remission ([facility])    • Allergic rhinitis due to allergen 08/15/2017   • Anemia    • Anxiety disorder 05/02/2017   • Asthma 08/15/2017   • Broken bones    • Congestive heart failure (CHF) ([facility])    • Constipation    • Depression 05/02/2017   • Diabetes ([facility])    • Essential hypertension 05/02/2017   • Gall stones    • Genital herpes 08/15/2017   • GERD (gastroesophageal reflux disease) 08/20/2020    Discussed symptoms with patient. Encouraged patient to add Famotidine to current med regimen rather than Bentyl. Patient will follow-up in 3 mos and we will see if Famotidine helped with her symptoms. Patient understood and agreed with plan.    • Head injury    • Heart attack ([facility])    • Heart disease    • Heart murmur    • Hemorrhoids    • History of gastric bypass 02/08/2021   • History of heart surgery 05/11/2021   • History of LAD (lymphadenopathy) 05/11/2021    coronary artery thrombosis   • HPV (human papilloma virus) infection 09/08/2020   • Hyperlipidemia 08/20/2020   • Hypertension    • IBS (irritable bowel syndrome) 08/20/2020    discussed risks of long term bentyl. Will see if sx improve with better tx of GERD sx. pt not currently having diarrhea or constipation, bloating or pain.    • Kidney stones    • Lymphedema of both lower extremities 05/11/2021   • Major depressive disorder 02/08/2021   • Migraine headache    •  "Night sweats    • Obsessive-compulsive disorder    • Pap smear abnormality of cervix 2021   • Post traumatic stress disorder    • Seizure (HCC)    • Self-injurious behavior    • Shortness of breath    • Sinus trouble    • STD exposure    • Substance abuse (HCC)    • Suicide attempt (HCC)    • Vitamin B12 deficiency 2021           Past Surgical History:   Procedure Laterality Date   • ABDOMINOPLASTY     • ADENOIDECTOMY     • APPENDECTOMY     • CARPAL TUNNEL RELEASE Bilateral     1992 right wrist-  left hand- left hand 2016   •  SECTION     • CHOLECYSTECTOMY     • COLONOSCOPY  2018    cologard   • CORONARY STENT PLACEMENT     • GASTRIC BYPASS      [id number]   • OTHER SURGICAL HISTORY      excision of uvula   • OTHER SURGICAL HISTORY      tubes and overy removal - removal of skin on arms     • OTHER SURGICAL HISTORY  2013    stent placement   • TONSILLECTOMY     • TUMOR REMOVAL       &  fatty          Social History     Socioeconomic History   • Marital status:    Tobacco Use   • Smoking status: Former Smoker     Packs/day: 2.00     Years: 20.00     Pack years: 40.00     Types: Cigarettes     Quit date:      Years since quittin.9   • Smokeless tobacco: Never Used   Vaping Use   • Vaping Use: Never used   Substance and Sexual Activity   • Alcohol use: Not Currently     Comment: former   • Drug use: Yes     Types: Marijuana     Comment: \"upper, downers, in betweeners, our coffe table was always full\"   • Sexual activity: Defer           Family History   Problem Relation Age of Onset   • Coronary artery disease Mother    • Alcohol abuse Mother    • Anxiety disorder Mother    • Depression Mother    • Prostate cancer Father    • Alcohol abuse Father    • Depression Father    • Coronary artery disease Brother    • Diabetes Brother    • Alcohol abuse Brother    • Anxiety disorder Brother    • Depression Brother    • Stroke " "Maternal Grandmother    • Coronary artery disease Maternal Grandmother    • Coronary artery disease Maternal Grandfather    • Stroke Paternal Grandmother    • Coronary artery disease Paternal Grandmother    • Coronary artery disease Paternal Grandfather    • Alcohol abuse Paternal Grandfather    • Heart disease Other    • Schizophrenia Other    • Coronary artery disease Other    • Ovarian cancer Other    • Coronary artery disease Other    • Diabetes Daughter    • ADD / ADHD Daughter    • Alcohol abuse Daughter    • Anxiety disorder Daughter    • Depression Daughter    • Drug abuse Daughter    • Self-Injurious Behavior  Daughter    • Suicide Attempts Daughter    • Alcohol abuse Maternal Aunt    • Alcohol abuse Paternal Aunt    • Alcohol abuse Maternal Uncle    • Dementia Maternal Uncle    • Schizophrenia Maternal Uncle    • Alcohol abuse Paternal Uncle             Allergies   Allergen Reactions   • Bee Venom Anaphylaxis   • Rexulti [Brexpiprazole] Other (See Comments)     Lethargic, difficulty walking and talking   • Zolpidem Mental Status Change and Other (See Comments)     \"IT MAKE ME ATTEMPT SUICIDE\"    • Amphetamine-Dextroamphetamine Mental Status Change   • Duloxetine Hcl Headache   • Lexapro [Escitalopram] Mental Status Change   • Losartan Nausea Only and Other (See Comments)     Blood pressure gets too low   • Nefazodone Other (See Comments)   • Olanzapine Dizziness   • Paxil [Paroxetine] Other (See Comments)     \"makes me feel numb\"   • Seroquel [Quetiapine] Other (See Comments)     \"makes me sleepy\" \"made me grumpy, I don't like it\"   • Trazodone Other (See Comments)     Heavily sedated day after taking medication   • Venlafaxine Mental Status Change   • Amlodipine GI Intolerance     UNKNOWN REACTION    • Ampicillin Rash   • Cephalexin Hives   • Codeine Itching   • Gabapentin Nausea Only   • Hydrocodone Itching and Other (See Comments)     \"pancreas issues\"   • Hydrocodone-Acetaminophen Itching   • " "Hydromorphone Nausea And Vomiting   • Ibuprofen Nausea And Vomiting   • Lisinopril Nausea Only   • Mirtazapine Other (See Comments)     \"MADE ME FEEL VERY LOOPY\"      • Oxycodone-Acetaminophen Itching   • Prednisone Other (See Comments)     \" CAUSED PANCREATITIS\"      • Sulfa Antibiotics Hives            Current Outpatient Medications:   •  albuterol sulfate  (90 Base) MCG/ACT inhaler, Inhale 2 puffs As Needed., Disp: , Rfl:   •  ascorbic acid (VITAMIN C) 500 MG tablet, Take 1 tablet by mouth Daily., Disp: , Rfl:   •  aspirin 81 MG EC tablet, Take 81 mg by mouth Daily., Disp: , Rfl:   •  caffeine 200 MG tablet, Take 1 tablet by mouth Daily., Disp: , Rfl:   •  cloNIDine (CATAPRES) 0.1 MG tablet, Take 1 tablet by mouth every night at bedtime., Disp: 30 tablet, Rfl: 6  •  dicyclomine (BENTYL) 10 MG capsule, Take 10 mg by mouth 3 (Three) Times a Day., Disp: , Rfl:   •  EPINEPHrine (EPIPEN) 0.3 MG/0.3ML solution auto-injector injection, Inject 0.3 mL under the skin into the appropriate area as directed See Admin Instructions., Disp: 1 each, Rfl: 1  •  fluticasone (Flonase) 50 MCG/ACT nasal spray, 2 sprays into the nostril(s) as directed by provider Daily., Disp: 16 g, Rfl: 5  •  hydrOXYzine (ATARAX) 50 MG tablet, Take 1 tablet by mouth 3 (Three) Times a Day., Disp: , Rfl:   •  levocetirizine (XYZAL) 5 MG tablet, TAKE 1 TABLET BY MOUTH EVERY DAY, Disp: 30 tablet, Rfl: 0  •  montelukast (SINGULAIR) 10 MG tablet, TAKE 1 TABLET BY MOUTH EVERY DAY, Disp: 30 tablet, Rfl: 2  •  omeprazole (priLOSEC) 40 MG capsule, TAKE 1 CAPSULE BY MOUTH EVERY DAY, Disp: 30 capsule, Rfl: 2  •  spironolactone (ALDACTONE) 25 MG tablet, Take 1 tablet by mouth Daily., Disp: 90 tablet, Rfl: 3  •  valACYclovir (VALTREX) 1000 MG tablet, Take 1 tablet by mouth Daily., Disp: 30 tablet, Rfl: 0    Current Facility-Administered Medications:   •  cyanocobalamin injection 1,000 mcg, 1,000 mcg, Intramuscular, Q14 Days, Priscilla Diallo, APRN, 1,000 " "mcg at 12/15/21 0923      ROS:  Cardiac review of systems negative.    Objective     /90   Pulse 80   Ht 168.9 cm (66.5\")   Wt 95.7 kg (211 lb)   BMI 33.55 kg/m²       General Appearance:   · well developed  · well nourished  HENT:   · oropharynx moist  · lips not cyanotic  Respiratory:  · no respiratory distress  · normal breath sounds  · no rales  Cardiovascular:  · no jugular venous distention  · regular rhythm  · S1 normal, S2 normal  · no S3, no S4   · no murmur  · no rub, no thrill  · No carotid bruit  · pedal pulses normal  · lower extremity edema: none    Musculoskeletal:  · no clubbing of fingers.   · normocephalic, head atraumatic  Skin:   · warm, dry  Psychiatric:  · judgement and insight appropriate  · normal mood and affect          Procedures                      ASSESSMENT:  Encounter Diagnoses   Name Primary?   • Coronary artery disease involving native coronary artery of native heart without angina pectoris Yes   • Essential hypertension    • Hyperlipemia, mixed          PLAN:    1.  Continue the aspirin 81 mg daily.  Patient notes no significant bleeding problems.  2.  Patient denies any chest pain.  3.  We will increase patient spironolactone to 25 mg p.o. daily.  We will back down on her clonidine to 1 pill at night.  She thinks that the clonidine could be causing dry mouth and some of her fatigue.  4.  Patient has an echocardiogram scheduled in January for this heart murmur.  When she has it done we will give her a call with the results.  5.  When we see the patient back we will talk to her about a cholesterol pill.  I do not want to make multiple medication changes/additions in this patient at the same time due to her history of allergic reactions to multiple medications.          Patient was given instructions and counseling regarding her condition or for health maintenance advice. Please see specific information pulled into the AVS if appropriate.         Guero Garcia MD "   12/16/2021  10:26 EST

## 2021-12-30 RX ORDER — SPIRONOLACTONE 25 MG/1
25 TABLET ORAL DAILY
Qty: 90 TABLET | Refills: 3 | Status: SHIPPED | OUTPATIENT
Start: 2021-12-30 | End: 2022-01-04

## 2022-01-04 RX ORDER — SPIRONOLACTONE 25 MG/1
25 TABLET ORAL DAILY
Qty: 90 TABLET | Refills: 3 | Status: SHIPPED | OUTPATIENT
Start: 2022-01-04 | End: 2022-04-08

## 2022-01-13 ENCOUNTER — OFFICE VISIT (OUTPATIENT)
Dept: FAMILY MEDICINE CLINIC | Facility: CLINIC | Age: 57
End: 2022-01-13

## 2022-01-13 ENCOUNTER — APPOINTMENT (OUTPATIENT)
Dept: CARDIOLOGY | Facility: HOSPITAL | Age: 57
End: 2022-01-13

## 2022-01-13 VITALS
HEART RATE: 93 BPM | OXYGEN SATURATION: 98 % | HEIGHT: 67 IN | TEMPERATURE: 96.9 F | WEIGHT: 209 LBS | BODY MASS INDEX: 32.8 KG/M2 | DIASTOLIC BLOOD PRESSURE: 86 MMHG | SYSTOLIC BLOOD PRESSURE: 148 MMHG

## 2022-01-13 DIAGNOSIS — R11.0 NAUSEA: ICD-10-CM

## 2022-01-13 DIAGNOSIS — Z20.822 EXPOSURE TO COVID-19 VIRUS: ICD-10-CM

## 2022-01-13 DIAGNOSIS — R68.89 FLU-LIKE SYMPTOMS: Primary | ICD-10-CM

## 2022-01-13 LAB
EXPIRATION DATE: NORMAL
EXPIRATION DATE: NORMAL
FLUAV AG NPH QL: NEGATIVE
FLUBV AG NPH QL: NEGATIVE
INTERNAL CONTROL: NORMAL
INTERNAL CONTROL: NORMAL
Lab: NORMAL
Lab: NORMAL
S PYO AG THROAT QL: NEGATIVE

## 2022-01-13 PROCEDURE — 87804 INFLUENZA ASSAY W/OPTIC: CPT | Performed by: NURSE PRACTITIONER

## 2022-01-13 PROCEDURE — 87880 STREP A ASSAY W/OPTIC: CPT | Performed by: NURSE PRACTITIONER

## 2022-01-13 PROCEDURE — U0004 COV-19 TEST NON-CDC HGH THRU: HCPCS | Performed by: NURSE PRACTITIONER

## 2022-01-13 PROCEDURE — U0005 INFEC AGEN DETEC AMPLI PROBE: HCPCS | Performed by: NURSE PRACTITIONER

## 2022-01-13 PROCEDURE — 99214 OFFICE O/P EST MOD 30 MIN: CPT | Performed by: NURSE PRACTITIONER

## 2022-01-13 RX ORDER — ONDANSETRON 4 MG/1
4 TABLET, ORALLY DISINTEGRATING ORAL EVERY 8 HOURS PRN
Qty: 30 TABLET | Refills: 0 | OUTPATIENT
Start: 2022-01-13 | End: 2022-04-14

## 2022-01-13 NOTE — PROGRESS NOTES
Chief Complaint  Nausea and Exposure To Known Illness    Subjective          Medical History: has a past medical history of Acid reflux disease, ADHD, Alcoholism in remission ([facility]), Allergic rhinitis due to allergen (08/15/2017), Anemia, Anxiety disorder (2017), Asthma (08/15/2017), Broken bones, Congestive heart failure (CHF) ([facility]), Constipation, Depression (2017), Diabetes ([facility]), Essential hypertension (2017), Gall stones, Genital herpes (08/15/2017), GERD (gastroesophageal reflux disease) (2020), Head injury, Heart attack ([facility]), Heart disease, Heart murmur, Hemorrhoids, History of gastric bypass (2021), History of heart surgery (2021), History of LAD (lymphadenopathy) (2021), HPV (human papilloma virus) infection (2020), Hyperlipidemia (2020), Hypertension, IBS (irritable bowel syndrome) (2020), Kidney stones, Lymphedema of both lower extremities (2021), Major depressive disorder (2021), Migraine headache, Night sweats, Obsessive-compulsive disorder, Pap smear abnormality of cervix (2021), Post traumatic stress disorder, Seizure ([facility]), Self-injurious behavior, Shortness of breath, Sinus trouble, STD exposure, Substance abuse ([facility]), Suicide attempt ([facility]), and Vitamin B12 deficiency (2021).     Surgical History: has a past surgical history that includes Adenoidectomy (); Appendectomy (); Carpal tunnel release ();  section (); Cholecystectomy (); Colonoscopy (2018); Other surgical history (); Tumor removal; Gastric bypass (); Other surgical history; Tonsillectomy (); Abdominoplasty (); Other surgical history (2013); and Coronary stent placement.     Family History: family history includes ADD / ADHD in her daughter; Alcohol abuse in her brother, daughter, father, maternal aunt, maternal uncle, mother, paternal aunt, paternal grandfather, and paternal uncle; Anxiety disorder  in her brother, daughter, and mother; Coronary artery disease in her brother, maternal grandfather, maternal grandmother, mother, paternal grandfather, paternal grandmother, and other family members; Dementia in her maternal uncle; Depression in her brother, daughter, father, and mother; Diabetes in her brother and daughter; Drug abuse in her daughter; Heart disease in an other family member; Ovarian cancer in an other family member; Prostate cancer in her father; Schizophrenia in her maternal uncle and another family member; Self-Injurious Behavior  in her daughter; Stroke in her maternal grandmother and paternal grandmother; Suicide Attempts in her daughter.     Social History: reports that she quit smoking about 34 years ago. Her smoking use included cigarettes. She has a 40.00 pack-year smoking history. She has never used smokeless tobacco. She reports previous alcohol use. She reports current drug use. Drug: Marijuana.    Esme Mcneil presents to [facility]  COVID exposure on 1/10/2022, daughter has COVID    Patient of Priscilla LEE    Nausea  This is a new problem. The problem occurs intermittently. The problem has been waxing and waning. Associated symptoms include fatigue and nausea. Pertinent negatives include no abdominal pain, chest pain, congestion, headaches, urinary symptoms or vomiting. The symptoms are aggravated by eating. She has tried nothing for the symptoms. The treatment provided no relief.   Fatigue  This is a new problem. The current episode started yesterday. The problem occurs intermittently. Associated symptoms include fatigue and nausea. Pertinent negatives include no abdominal pain, chest pain, congestion, headaches, urinary symptoms or vomiting. Nothing aggravates the symptoms. She has tried nothing for the symptoms. The treatment provided no relief.       Objective   Vital Signs:   /86 (BP Location: Left arm, Patient Position:  "Sitting, Cuff Size: Adult)   Pulse 93   Temp 96.9 °F (36.1 °C) (Temporal)   Ht 168.9 cm (66.5\")   Wt 94.8 kg (209 lb)   SpO2 98%   BMI 33.23 kg/m²     Physical Exam  Vitals reviewed.   Constitutional:       Appearance: Normal appearance. She is well-developed.   HENT:      Head: Normocephalic and atraumatic.      Right Ear: Tympanic membrane and external ear normal.      Left Ear: Tympanic membrane and external ear normal.      Mouth/Throat:      Mouth: Mucous membranes are moist.      Pharynx: No oropharyngeal exudate or posterior oropharyngeal erythema.   Eyes:      Conjunctiva/sclera: Conjunctivae normal.      Pupils: Pupils are equal, round, and reactive to light.   Cardiovascular:      Rate and Rhythm: Normal rate and regular rhythm.      Heart sounds: No murmur heard.  No friction rub.   Pulmonary:      Effort: Pulmonary effort is normal.      Breath sounds: Normal breath sounds. No wheezing or rhonchi.   Abdominal:      Palpations: Abdomen is soft.   Skin:     General: Skin is warm and dry.   Neurological:      Mental Status: She is alert and oriented to person, place, and time.   Psychiatric:         Mood and Affect: Mood and affect normal.         Behavior: Behavior normal.         Thought Content: Thought content normal.         Judgment: Judgment normal.        Result Review :   The following data was reviewed by: ROSA Price on 01/13/2022:  Common labs    Common Labsle 5/10/21 5/10/21 5/10/21 11/9/21    2144 2144 2144    Glucose   99 85   BUN   18 23 (A)   Creatinine   0.65 0.59   eGFR Non African Am    106   Sodium   139 142   Potassium   4.2 4.1   Chloride   104 109 (A)   Calcium   8.8 8.9   Albumin   3.9    Total Bilirubin   0.31    Alkaline Phosphatase   82    AST (SGOT)   20    ALT (SGPT)   12    WBC 6.14      Hemoglobin 11.3 (A)      Hematocrit 35.8 (A)      Platelets 304      Hemoglobin A1C  5.5     (A) Abnormal value       Comments are available for some flowsheets but are " not being displayed.                       Current Outpatient Medications on File Prior to Visit   Medication Sig Dispense Refill   • albuterol sulfate  (90 Base) MCG/ACT inhaler Inhale 2 puffs As Needed.     • ascorbic acid (VITAMIN C) 500 MG tablet Take 1 tablet by mouth Daily.     • aspirin 81 MG EC tablet Take 81 mg by mouth Daily.     • caffeine 200 MG tablet Take 1 tablet by mouth Daily.     • cloNIDine (CATAPRES) 0.1 MG tablet Take 1 tablet by mouth every night at bedtime. 30 tablet 6   • dicyclomine (BENTYL) 10 MG capsule Take 10 mg by mouth 3 (Three) Times a Day.     • EPINEPHrine (EPIPEN) 0.3 MG/0.3ML solution auto-injector injection Inject 0.3 mL under the skin into the appropriate area as directed See Admin Instructions. 1 each 1   • fluticasone (Flonase) 50 MCG/ACT nasal spray 2 sprays into the nostril(s) as directed by provider Daily. 16 g 5   • hydrOXYzine (ATARAX) 50 MG tablet Take 1 tablet by mouth 3 (Three) Times a Day.     • levocetirizine (XYZAL) 5 MG tablet TAKE 1 TABLET BY MOUTH EVERY DAY 30 tablet 0   • montelukast (SINGULAIR) 10 MG tablet TAKE 1 TABLET BY MOUTH EVERY DAY 30 tablet 2   • omeprazole (priLOSEC) 40 MG capsule TAKE 1 CAPSULE BY MOUTH EVERY DAY 30 capsule 2   • spironolactone (ALDACTONE) 25 MG tablet Take 1 tablet by mouth Daily. 90 tablet 3   • valACYclovir (VALTREX) 1000 MG tablet Take 1 tablet by mouth Daily. 30 tablet 0     Current Facility-Administered Medications on File Prior to Visit   Medication Dose Route Frequency Provider Last Rate Last Admin   • cyanocobalamin injection 1,000 mcg  1,000 mcg Intramuscular Q14 Days Priscilla Diallo APRN   1,000 mcg at 12/15/21 0923      Lab Results   Component Value Date    RAPFLUA Negative 01/13/2022    RAPFLUB Negative 01/13/2022     Lab Results   Component Value Date    RAPSCRN Negative 01/13/2022       Assessment and Plan    Diagnoses and all orders for this visit:    1. Flu-like symptoms (Primary)  Comments:  Flu will  treat symptomatically.  Home, rest, drink plenty of fluids.  Patient verbalized understanding  Orders:  -     COVID-19,APTIMA PANTHER(MAGDALENA),[facility]/ [facility]YLEE, NP/OP SWAB IN UTM/VTM/[facility]ALINE TRANSPORT MEDIA,24 HR TAT - Swab, Nasopharynx  -     POCT Influenza A/B  -     POCT rapid strep A    2. Nausea  Comments:  Will give nausea medicine to take as needed.  Will check for COVID since patient has had a direct exposure from her daughter.  Orders:  -     POCT Influenza A/B  -     POCT rapid strep A    3. Exposure to COVID-19 virus  Comments:  Discussed quarantine until test is back.  Patient verbalized understanding.    Other orders  -     ondansetron ODT (ZOFRAN-ODT) 4 MG disintegrating tablet; Place 1 tablet on the tongue Every 8 (Eight) Hours As Needed for Nausea or Vomiting.  Dispense: 30 tablet; Refill: 0        Follow Up   Return for If symptoms do not improve new concerning symptoms.  Patient was given instructions and counseling regarding her condition or for health maintenance advice. Please see specific information pulled into the AVS if appropriate.

## 2022-01-14 LAB — SARS-COV-2 RNA PNL SPEC NAA+PROBE: NOT DETECTED

## 2022-01-18 RX ORDER — MONTELUKAST SODIUM 10 MG/1
10 TABLET ORAL DAILY
Qty: 30 TABLET | Refills: 2 | Status: SHIPPED | OUTPATIENT
Start: 2022-01-18 | End: 2022-06-22 | Stop reason: SDUPTHER

## 2022-01-18 RX ORDER — DICYCLOMINE HYDROCHLORIDE 10 MG/1
10 CAPSULE ORAL 3 TIMES DAILY
Qty: 90 CAPSULE | Refills: 5 | Status: SHIPPED | OUTPATIENT
Start: 2022-01-18 | End: 2022-06-22 | Stop reason: SDUPTHER

## 2022-01-18 RX ORDER — VALACYCLOVIR HYDROCHLORIDE 1 G/1
1000 TABLET, FILM COATED ORAL DAILY
Qty: 30 TABLET | Refills: 0 | Status: SHIPPED | OUTPATIENT
Start: 2022-01-18 | End: 2022-12-21 | Stop reason: SDUPTHER

## 2022-01-18 RX ORDER — OMEPRAZOLE 40 MG/1
40 CAPSULE, DELAYED RELEASE ORAL DAILY
Qty: 30 CAPSULE | Refills: 2 | Status: SHIPPED | OUTPATIENT
Start: 2022-01-18 | End: 2022-06-22 | Stop reason: SDUPTHER

## 2022-01-18 RX ORDER — EPINEPHRINE 0.3 MG/.3ML
0.3 INJECTION SUBCUTANEOUS SEE ADMIN INSTRUCTIONS
Qty: 1 EACH | Refills: 1 | Status: SHIPPED | OUTPATIENT
Start: 2022-01-18 | End: 2022-06-22 | Stop reason: SDUPTHER

## 2022-01-18 RX ORDER — LEVOCETIRIZINE DIHYDROCHLORIDE 5 MG/1
5 TABLET, FILM COATED ORAL DAILY
Qty: 30 TABLET | Refills: 0 | Status: SHIPPED | OUTPATIENT
Start: 2022-01-18 | End: 2022-02-16

## 2022-01-18 NOTE — TELEPHONE ENCOUNTER
Caller: Esme Mcneil    Relationship: Self    Best call back number: [phone]    Requested Prescriptions:   Requested Prescriptions     Pending Prescriptions Disp Refills   • levocetirizine (XYZAL) 5 MG tablet 30 tablet 0     Sig: Take 1 tablet by mouth Daily.   • valACYclovir (VALTREX) 1000 MG tablet 30 tablet 0     Sig: Take 1 tablet by mouth Daily.   • dicyclomine (BENTYL) 10 MG capsule       Sig: Take 1 capsule by mouth 3 (Three) Times a Day.   • omeprazole (priLOSEC) 40 MG capsule 30 capsule 2     Sig: Take 1 capsule by mouth Daily.   • montelukast (SINGULAIR) 10 MG tablet 30 tablet 2     Sig: Take 1 tablet by mouth Daily.   • EPINEPHrine (EPIPEN) 0.3 MG/0.3ML solution auto-injector injection 1 each 1     Sig: Inject 0.3 mL under the skin into the appropriate area as directed See Admin Instructions.        [address] [facility] [phone]  - [phone] FX     Additional details provided by patient:     Does the patient have less than a 3 day supply:  [x] Yes  [] No    Rich Caicedo Rep   01/18/22 09:57 EST

## 2022-01-28 ENCOUNTER — OFFICE VISIT (OUTPATIENT)
Dept: FAMILY MEDICINE CLINIC | Facility: CLINIC | Age: 57
End: 2022-01-28

## 2022-01-28 VITALS
SYSTOLIC BLOOD PRESSURE: 140 MMHG | BODY MASS INDEX: 33.37 KG/M2 | WEIGHT: 212.6 LBS | TEMPERATURE: 99.2 F | DIASTOLIC BLOOD PRESSURE: 84 MMHG | OXYGEN SATURATION: 93 % | HEART RATE: 92 BPM | HEIGHT: 67 IN

## 2022-01-28 DIAGNOSIS — J32.9 SINUSITIS, UNSPECIFIED CHRONICITY, UNSPECIFIED LOCATION: ICD-10-CM

## 2022-01-28 DIAGNOSIS — R82.90 ABNORMAL URINE ODOR: ICD-10-CM

## 2022-01-28 DIAGNOSIS — R51.9 ACUTE NONINTRACTABLE HEADACHE, UNSPECIFIED HEADACHE TYPE: ICD-10-CM

## 2022-01-28 DIAGNOSIS — J02.9 SORE THROAT: ICD-10-CM

## 2022-01-28 DIAGNOSIS — R05.9 COUGH: Primary | ICD-10-CM

## 2022-01-28 DIAGNOSIS — F33.2 SEVERE EPISODE OF RECURRENT MAJOR DEPRESSIVE DISORDER, WITHOUT PSYCHOTIC FEATURES: ICD-10-CM

## 2022-01-28 LAB
BILIRUB BLD-MCNC: NEGATIVE MG/DL
CLARITY, POC: CLEAR
COLOR UR: YELLOW
EXPIRATION DATE: NORMAL
FLUAV AG NPH QL: NEGATIVE
FLUBV AG NPH QL: NEGATIVE
GLUCOSE UR STRIP-MCNC: NEGATIVE MG/DL
INTERNAL CONTROL: NORMAL
INTERNAL CONTROL: NORMAL
KETONES UR QL: NEGATIVE
LEUKOCYTE EST, POC: NEGATIVE
Lab: NORMAL
NITRITE UR-MCNC: NEGATIVE MG/ML
PH UR: 5.5 [PH] (ref 5–8)
PROT UR STRIP-MCNC: NEGATIVE MG/DL
RBC # UR STRIP: NEGATIVE /UL
S PYO AG THROAT QL: NEGATIVE
SARS-COV-2 RNA PNL SPEC NAA+PROBE: NOT DETECTED
SP GR UR: 1.02 (ref 1–1.03)
UROBILINOGEN UR QL: NORMAL

## 2022-01-28 PROCEDURE — U0005 INFEC AGEN DETEC AMPLI PROBE: HCPCS | Performed by: NURSE PRACTITIONER

## 2022-01-28 PROCEDURE — 87804 INFLUENZA ASSAY W/OPTIC: CPT | Performed by: NURSE PRACTITIONER

## 2022-01-28 PROCEDURE — U0004 COV-19 TEST NON-CDC HGH THRU: HCPCS | Performed by: NURSE PRACTITIONER

## 2022-01-28 PROCEDURE — 99213 OFFICE O/P EST LOW 20 MIN: CPT | Performed by: NURSE PRACTITIONER

## 2022-01-28 PROCEDURE — 87880 STREP A ASSAY W/OPTIC: CPT | Performed by: NURSE PRACTITIONER

## 2022-01-28 PROCEDURE — 81003 URINALYSIS AUTO W/O SCOPE: CPT | Performed by: NURSE PRACTITIONER

## 2022-01-28 RX ORDER — HYDROCHLOROTHIAZIDE 12.5 MG/1
12.5 CAPSULE, GELATIN COATED ORAL DAILY
COMMUNITY
Start: 2022-01-13 | End: 2022-02-18

## 2022-01-28 RX ORDER — BENZONATATE 200 MG/1
200 CAPSULE ORAL 3 TIMES DAILY PRN
Qty: 21 CAPSULE | Refills: 0 | Status: SHIPPED | OUTPATIENT
Start: 2022-01-28 | End: 2022-02-04

## 2022-01-28 RX ORDER — AZITHROMYCIN 250 MG/1
TABLET, FILM COATED ORAL
Qty: 6 TABLET | Refills: 0 | Status: SHIPPED | OUTPATIENT
Start: 2022-01-28 | End: 2022-02-02

## 2022-01-28 RX ORDER — VORTIOXETINE 20 MG/1
1 TABLET, FILM COATED ORAL DAILY
COMMUNITY
Start: 2022-01-13 | End: 2022-02-18 | Stop reason: SINTOL

## 2022-01-28 NOTE — PROGRESS NOTES
"Chief Complaint  Cough (x 1 week), Nasal Congestion, Headache, and Earache (right ear)    Subjective          Esme Mcneil presents to [facility]  History of Present Illness   56-year-old female presents today with complaints of cough, runny nose, headache and right earache for 1 week.      She is also complaining of an odor to her urine.  She admits she does not drink water she does not like the taste of it.  Urinalysis in the office is negative for UTI.    She states that she had a severe headache after her last vitamin B12 shot about 1 month ago.  She states she has never had this before.    She has a long history of depression and anxiety, failed on multiple medications.  She states she is not currently taking any medications for her depression.  She states she has been seen ROSA Quan but she would like to see someone different.    She needs to have labs for her chronic conditions.  I will have her make a follow-up in about 2 weeks.    Objective   Vital Signs:   /84   Pulse 92   Temp 99.2 °F (37.3 °C)   Ht 168.9 cm (66.5\")   Wt 96.4 kg (212 lb 9.6 oz)   SpO2 93%   BMI 33.80 kg/m²     Physical Exam  Vitals reviewed.   Constitutional:       Appearance: Normal appearance. She is well-developed.   HENT:      Right Ear: Tympanic membrane, ear canal and external ear normal.      Left Ear: Tympanic membrane, ear canal and external ear normal.      Mouth/Throat:      Mouth: Mucous membranes are moist.      Pharynx: No pharyngeal swelling, oropharyngeal exudate or posterior oropharyngeal erythema.   Neck:      Thyroid: No thyroid mass, thyromegaly or thyroid tenderness.   Cardiovascular:      Rate and Rhythm: Normal rate and regular rhythm.      Heart sounds: No murmur heard.  No friction rub. No gallop.    Pulmonary:      Effort: Pulmonary effort is normal.      Breath sounds: Normal breath sounds. No wheezing or rhonchi.   Lymphadenopathy:      Cervical: No " cervical adenopathy.   Skin:     General: Skin is warm and dry.   Neurological:      Mental Status: She is alert and oriented to person, place, and time.      Cranial Nerves: No cranial nerve deficit.   Psychiatric:         Mood and Affect: Mood and affect normal.         Behavior: Behavior normal.         Thought Content: Thought content normal. Thought content does not include homicidal or suicidal ideation.         Judgment: Judgment normal.        Result Review :     Influenza A&B    [facility] 1/28/22   Rapid Influenza A Ag Negative   Rapid Influenza B Ag Negative           Strep    [facility] 1/28/22   POC Strep A, Molecular Negative                     Assessment and Plan    Diagnoses and all orders for this visit:    1. Cough (Primary)  Comments:  Tessalon Perles 3 times daily as needed cough.  Orders:  -     POCT rapid strep A  -     POCT Influenza A/B  -     Cancel: COVID-19,APTIMA PANTHER(MAGDALENA),[facility], NP/OP SWAB IN UTM/VTM/SALINE TRANSPORT MEDIA,24 HR TAT - Swab, Nasal Cavity  -     COVID-19,APTIMA PANTHER(MAGDALENA),[facility], NP/OP SWAB IN UTM/VTM/SALINE TRANSPORT MEDIA,24 HR TAT - Swab, Nasopharynx    2. Sore throat  Comments:  Rapid strep swab in office today is negative.  Orders:  -     POCT rapid strep A  -     COVID-19,APTIMA PANTHER(MAGDALENA),[facility], NP/OP SWAB IN UTM/VTM/SALINE TRANSPORT MEDIA,24 HR TAT - Swab, Nasopharynx    3. Acute nonintractable headache, unspecified headache type  -     POCT rapid strep A  -     POCT Influenza A/B  -     Cancel: COVID-19,APTIMA PANTHER(MAGDALENA),[facility], NP/OP SWAB IN UTM/VTM/SALINE TRANSPORT MEDIA,24 HR TAT - Swab, Nasal Cavity  -     COVID-19,APTIMA PANTHER(MAGDALENA),[facility], NP/OP SWAB IN UTM/VTM/SALINE TRANSPORT MEDIA,24 HR TAT - Swab, Nasopharynx    4. Sinusitis, unspecified chronicity, unspecified location  Comments:  I will treat her sinusitis with a Z-Mundo, she is very limited to what she can take due to her multiple allergies.    5.  Abnormal urine odor  -     POCT urinalysis dipstick, automated    Other orders  -     benzonatate (TESSALON) 200 MG capsule; Take 1 capsule by mouth 3 (Three) Times a Day As Needed for Cough for up to 7 days.  Dispense: 21 capsule; Refill: 0  -     azithromycin (ZITHROMAX) 250 MG tablet; Take 2 tablets by mouth Daily for 1 day, THEN 1 tablet Daily for 4 days.  Dispense: 6 tablet; Refill: 0    Rapid flu swab in office today is negative.  A Covid PCR swab was obtained.  Discussed with patient to quarantine for at least 10 days from onset of symptoms and symptoms have resolved.  COVID-19 care packet given to patient and discussed.  Patient instructed not to go anywhere except to seek medical care.  Instructed to seek medical care for any difficulty of breathing, unable to keep fluids down, or worsening of symptoms.    Follow Up   Return in about 2 weeks (around 2/11/2022) for 30 min apt for complex pt, Next scheduled follow up.  Patient was given instructions and counseling regarding her condition or for health maintenance advice. Please see specific information pulled into the AVS if appropriate.

## 2022-01-28 NOTE — ASSESSMENT & PLAN NOTE
I discussed with her that we will see if I can get her in to see Jyoti Samuels who comes to our office here.  We will discuss further on her next follow-up.

## 2022-01-28 NOTE — PATIENT INSTRUCTIONS
3 Key Steps to Take While Waiting for Your COVID-19 Test Result  To help stop the spread of COVID-19, take these 3 key steps NOW while waiting for your test results:  1. Stay home and monitor your health.  Stay home and monitor your health to help protect your friends, family, and others from possibly getting COVID-19 from you.  Stay home and away from others:  · If possible, stay away from others, especially people who are at higher risk for getting very sick from COVID-19, such as older adults and people with other medical conditions.  · If you have been in contact with someone with COVID-19, stay home and away from others for 14 days after your last contact with that person. Follow the recommendations of your local public health department if you need to quarantine.  · If you have a fever, cough or other symptoms of COVID-19, stay home and away from others (except to get medical care).  Monitor your health:  · Watch for fever, cough, shortness of breath, or other symptoms of COVID-19. Remember, symptoms may appear 2-14 days after exposure to COVID-19 and can include:  ? Fever or chills  ? Cough  ? Shortness of breath or difficulty breathing  ? Tiredness  ? Muscle or body aches  ? Headache  ? New loss of taste or smell  ? Sore throat  ? Congestion or runny nose  ? Nausea or vomiting  ? Diarrhea  2. Think about the people you have recently been around.  If you are diagnosed with COVID-19, a public health worker may call you to check on your health, discuss who you have been around, and ask where you spent time while you may have been able to spread COVID-19 to others. While you wait for your COVID-19 test result, think about everyone you have been around recently. This will be important information to give health workers if your test is positive.   Complete the information on the back of this page to help you remember everyone you have been around.   3. Answer the phone call from the health department.  If a public  health worker calls you, answer the call to help slow the spread of COVID-19 in your community.  · Discussions with health department staff are confidential. This means that your personal and medical information will be kept private and only shared with those who may need to know, like your health care provider.  · Your name will not be shared with those you came in contact with. The health department will only notify people you were in close contact with (within 6 feet for more than 15 minutes) that they might have been exposed to COVID-19.  Think about the people you have recently been around  If you test positive and are diagnosed with COVID-19, someone from the health department may call to check-in on your health, discuss who you have been around, and ask where you spent time while you may have been able to spread COVID-19 to others. This form can help you think about people you have recently been around so you will be ready if a public health worker calls you.  Things to think about. Have you:  · Gone to work or school?  · Gotten together with others (eaten out at a restaurant, gone out for drinks, exercised with others or gone to a gym, had friends or family over to your house, volunteered, gone to a party, pool, or park)?  · Gone to a store in person (e.g., grocery store, mall)?  · Gone to in-person appointments (e.g., salon, quiroga, doctor's or dentist's office)?  · Ridden in a car with others (e.g., rideshare) or taken public transportation?  · Been inside a Hinduism, Mosque, Confucianist or other places of Bahai?  Who lives with you?  · ______________________________________________________________________  · ______________________________________________________________________  · ______________________________________________________________________  · ______________________________________________________________________  Who have you been around (less than 6 feet for a total of 15 minutes or more) in the  last 10 days? (You may have more people to list than the space provided. If so, write on the front of this sheet or a separate piece of paper.)  Name ______________________________________________  · Phone number ____________________________________  · Date you last saw them _____________________________  · Where you last saw them ________________________________________________  Name ______________________________________________  · Phone number ____________________________________  · Date you last saw them _____________________________  · Where you last saw them ________________________________________________  Name ______________________________________________  · Phone number ____________________________________  · Date you last saw them _____________________________  · Where you last saw them ________________________________________________  Name ______________________________________________  · Phone number ____________________________________  · Date you last saw them _____________________________  · Where you last saw them ________________________________________________  Name ______________________________________________  · Phone number ____________________________________  · Date you last saw them _____________________________  · Where you last saw them ________________________________________________  What have you done in the last 10 days with other people?  Activity _____________________________________________  · Location _________________________________________  · Date ____________________________________________  Activity _____________________________________________  · Location _________________________________________  · Date ____________________________________________  Activity _____________________________________________  · Location _________________________________________  · Date ____________________________________________  Activity _____________________________________________  · Location  _________________________________________  · Date ____________________________________________  Activity _____________________________________________  · Location _________________________________________  · Date ____________________________________________  Centers for Disease Control and Prevention  cdc.gov/coronavirus  07/09/2021  This information is not intended to replace advice given to you by your health care provider. Make sure you discuss any questions you have with your health care provider.  Document Revised: 07/14/2021 Document Reviewed: 07/14/2021  [email or web address] Patient Education © 2021 [employer]  10 Things You Can Do to Manage Your COVID-19 Symptoms at Home  If you have possible or confirmed COVID-19:  1. Stay home except to get medical care.  2. Monitor your symptoms carefully. If your symptoms get worse, call your healthcare provider immediately.  3. Get rest and stay hydrated.  4. If you have a medical appointment, call the healthcare provider ahead of time and tell them that you have or may have COVID-19.  5. For medical emergencies, call 911 and notify the dispatch personnel that you have or may have COVID-19.  6. Cover your cough and sneezes with a tissue or use the inside of your elbow.  7. Wash your hands often with soap and water for at least 20 seconds or clean your hands with an alcohol-based hand  that contains at least 60% alcohol.  8. As much as possible, stay in a specific room and away from other people in your home. Also, you should use a separate bathroom, if available. If you need to be around other people in or outside of the home, wear a mask.  9. Avoid sharing personal items with other people in your household, like dishes, towels, and bedding.  10. Clean all surfaces that are touched often, like counters, tabletops, and doorknobs. Use household cleaning sprays or wipes according to the label instructions.  cdc.gov/coronavirus  07/16/2021  This information is not intended  to replace advice given to you by your health care provider. Make sure you discuss any questions you have with your health care provider.  Document Revised: 08/04/2021 Document Reviewed: 08/04/2021  [employer] Patient Education © 2021 [employer]

## 2022-01-31 ENCOUNTER — HOSPITAL ENCOUNTER (OUTPATIENT)
Dept: CARDIOLOGY | Facility: HOSPITAL | Age: 57
Discharge: HOME | End: 2022-01-31
Admitting: INTERNAL MEDICINE

## 2022-01-31 DIAGNOSIS — I35.1 NONRHEUMATIC AORTIC VALVE INSUFFICIENCY: ICD-10-CM

## 2022-01-31 PROCEDURE — 93306 TTE W/DOPPLER COMPLETE: CPT

## 2022-01-31 PROCEDURE — 93306 TTE W/DOPPLER COMPLETE: CPT | Performed by: INTERNAL MEDICINE

## 2022-02-02 LAB
AORTIC DIMENSIONLESS INDEX: 0.44 (DI)
BH CV ECHO MEAS - AO MAX PG: 33 MMHG
BH CV ECHO MEAS - AO MEAN PG: 19 MMHG
BH CV ECHO MEAS - AO ROOT DIAM: 2.9 CM
BH CV ECHO MEAS - AO V2 MAX: 290 CM/SEC
BH CV ECHO MEAS - AVA PLANIMETRY TRACED: 1.4 CM2
BH CV ECHO MEAS - EF(MOD-BP): 65 %
BH CV ECHO MEAS - IVSD: 1.4 CM
BH CV ECHO MEAS - LA DIMENSION(2D): 3.6 CM
BH CV ECHO MEAS - LAT PEAK E' VEL: 7 CM/SEC
BH CV ECHO MEAS - LV V1 MAX: 120 CM/SEC
BH CV ECHO MEAS - LVIDD: 4.8 CM
BH CV ECHO MEAS - LVIDS: 3.8 CM
BH CV ECHO MEAS - LVOT DIAM: 2 CM
BH CV ECHO MEAS - LVPWD: 1 CM
BH CV ECHO MEAS - MED PEAK E' VEL: 6 CM/SEC
BH CV ECHO MEAS - MV A MAX VEL: 106 CM/SEC
BH CV ECHO MEAS - MV DEC TIME: 143 MSEC
BH CV ECHO MEAS - MV E MAX VEL: 105 CM/SEC
BH CV ECHO MEAS - MV E/A: 1
BH CV ECHO MEASUREMENTS AVERAGE E/E' RATIO: 16.15
IVRT: 55 MSEC
LEFT ATRIUM VOLUME INDEX: 33 ML/M2
MAXIMAL PREDICTED HEART RATE: 165 BPM
STRESS TARGET HR: 140 BPM

## 2022-02-09 ENCOUNTER — OFFICE VISIT (OUTPATIENT)
Dept: FAMILY MEDICINE CLINIC | Facility: CLINIC | Age: 57
End: 2022-02-09

## 2022-02-09 VITALS
OXYGEN SATURATION: 95 % | SYSTOLIC BLOOD PRESSURE: 136 MMHG | HEART RATE: 87 BPM | BODY MASS INDEX: 33.24 KG/M2 | TEMPERATURE: 98.7 F | HEIGHT: 67 IN | DIASTOLIC BLOOD PRESSURE: 86 MMHG | WEIGHT: 211.8 LBS

## 2022-02-09 DIAGNOSIS — R06.02 SHORTNESS OF BREATH: ICD-10-CM

## 2022-02-09 DIAGNOSIS — R05.9 COUGH: Primary | ICD-10-CM

## 2022-02-09 DIAGNOSIS — Z20.822 EXPOSURE TO COVID-19 VIRUS: ICD-10-CM

## 2022-02-09 DIAGNOSIS — J45.40 MODERATE PERSISTENT ASTHMA WITHOUT COMPLICATION: ICD-10-CM

## 2022-02-09 LAB — SARS-COV-2 RNA PNL SPEC NAA+PROBE: DETECTED

## 2022-02-09 PROCEDURE — U0004 COV-19 TEST NON-CDC HGH THRU: HCPCS | Performed by: NURSE PRACTITIONER

## 2022-02-09 PROCEDURE — 99214 OFFICE O/P EST MOD 30 MIN: CPT | Performed by: NURSE PRACTITIONER

## 2022-02-09 RX ORDER — ALBUTEROL SULFATE 90 UG/1
2 AEROSOL, METERED RESPIRATORY (INHALATION) EVERY 4 HOURS PRN
Qty: 18 G | Refills: 5 | Status: SHIPPED | OUTPATIENT
Start: 2022-02-09 | End: 2022-06-22 | Stop reason: SDUPTHER

## 2022-02-09 RX ORDER — BUDESONIDE AND FORMOTEROL FUMARATE DIHYDRATE 160; 4.5 UG/1; UG/1
2 AEROSOL RESPIRATORY (INHALATION)
Qty: 10.2 G | Refills: 5 | Status: SHIPPED | OUTPATIENT
Start: 2022-02-09 | End: 2022-02-18

## 2022-02-09 RX ORDER — BROMPHENIRAMINE MALEATE, PSEUDOEPHEDRINE HYDROCHLORIDE, AND DEXTROMETHORPHAN HYDROBROMIDE 2; 30; 10 MG/5ML; MG/5ML; MG/5ML
5 SYRUP ORAL 4 TIMES DAILY PRN
Qty: 473 ML | Refills: 0 | Status: SHIPPED | OUTPATIENT
Start: 2022-02-09 | End: 2022-02-18

## 2022-02-09 NOTE — PROGRESS NOTES
"Chief Complaint  Nasal Congestion and Shortness of Breath    Subjective          Esme Mcneil presents to [facility]  History of Present Illness   56-year-old female presents today complaining of congestion, shortness of air and Covid exposure.  She was just seen in the office on 1/28/2022 with complaints of cough, runny nose and headache.  She was treated for sinus infection with a Z-Mundo and Tessalon Perles.  She was tested for strep, flu and Covid and all were negative.  She states that her daughter was positive for Covid 1 month ago and continues to test positive for Covid.    She does have a history of asthma. She states she is out of her albuterol inhaler. She does take Singulair 10 mg daily. She has an allergy to prednisone, it caused pancreatitis.  Objective   Vital Signs:   /86   Pulse 87   Temp 98.7 °F (37.1 °C)   Ht 168.9 cm (66.5\")   Wt 96.1 kg (211 lb 12.8 oz)   SpO2 95%   BMI 33.67 kg/m²     Physical Exam  Vitals reviewed.   Constitutional:       Appearance: Normal appearance. She is well-developed.   HENT:      Right Ear: Tympanic membrane, ear canal and external ear normal.      Left Ear: Tympanic membrane, ear canal and external ear normal.      Mouth/Throat:      Mouth: Mucous membranes are moist.      Pharynx: No pharyngeal swelling, oropharyngeal exudate or posterior oropharyngeal erythema.   Neck:      Thyroid: No thyroid mass, thyromegaly or thyroid tenderness.   Cardiovascular:      Rate and Rhythm: Normal rate and regular rhythm.      Heart sounds: No murmur heard.  No friction rub. No gallop.    Pulmonary:      Effort: Pulmonary effort is normal.      Breath sounds: Normal breath sounds. No wheezing or rhonchi.   Lymphadenopathy:      Cervical: No cervical adenopathy.   Skin:     General: Skin is warm and dry.   Neurological:      Mental Status: She is alert and oriented to person, place, and time.      Cranial Nerves: No cranial nerve " deficit.   Psychiatric:         Mood and Affect: Mood and affect normal.         Behavior: Behavior normal.         Thought Content: Thought content normal. Thought content does not include homicidal or suicidal ideation.         Judgment: Judgment normal.        Result Review :                 Assessment and Plan    Diagnoses and all orders for this visit:    1. Cough (Primary)  Comments:  Bromfed-DM as needed.  Orders:  -     COVID-19,APTIMA PANTHER(MAGDALENA),[facility], NP/OP SWAB IN UTM/VTM/SALINE TRANSPORT MEDIA,24 HR TAT - Swab, Nasopharynx  -     XR Chest PA & Lateral; Future    2. Shortness of breath  Comments:  Due to her continued cough and shortness of breath I will have her get a chest x-ray.  Orders:  -     COVID-19,APTIMA PANTHER(MAGDALENA),[facility], NP/OP SWAB IN UTM/VTM/SALINE TRANSPORT MEDIA,24 HR TAT - Swab, Nasopharynx  -     XR Chest PA & Lateral; Future    3. Exposure to COVID-19 virus  Comments:  We will go ahead and recheck her for Covid today as per her request.  Orders:  -     COVID-19,APTIMA PANTHER(MAGDALENA),[facility], NP/OP SWAB IN UTM/VTM/SALINE TRANSPORT MEDIA,24 HR TAT - Swab, Nasopharynx    4. Moderate persistent asthma without complication  Assessment & Plan:  Asthma is worsening.  The patient is experiencing frequent daytime asthma symptoms. She is experiencing frequent nighttime asthma symptoms.  Asthma information handout given.  Discussed monitoring symptoms and use of quick-relief medications and contacting us early in the course of exacerbations.  Warning signs of respiratory distress were reviewed with the patient.   Medications: continue Singulair every evening and albuterol inhaler as needed for rescue inhaler and begin Symbicort inhaler twice daily. Advised patient to rinse mouth after each use of the Symbicort inhaler. She was concerned about it having a steroid but I explained to her this is an inhaled steroid and will not cause pancreatitis.  Discussed medication dosage,  use, side effects, and goals of treatment in detail.    Follow up in 2 weeks, or sooner should new symptoms or problems arise.          Other orders  -     brompheniramine-pseudoephedrine-DM 30-2-10 MG/5ML syrup; Take 5 mL by mouth 4 (Four) Times a Day As Needed for Congestion or Cough.  Dispense: 473 mL; Refill: 0  -     albuterol sulfate  (90 Base) MCG/ACT inhaler; Inhale 2 puffs Every 4 (Four) Hours As Needed for Wheezing or Shortness of Air.  Dispense: 18 g; Refill: 5  -     budesonide-formoterol (Symbicort) 160-4.5 MCG/ACT inhaler; Inhale 2 puffs 2 (Two) Times a Day.  Dispense: 10.2 g; Refill: 5      Follow Up   Return in about 2 weeks (around 2/23/2022) for 30 min apt for complex pt.  Patient was given instructions and counseling regarding her condition or for health maintenance advice. Please see specific information pulled into the AVS if appropriate.

## 2022-02-09 NOTE — ASSESSMENT & PLAN NOTE
Asthma is worsening.  The patient is experiencing frequent daytime asthma symptoms. She is experiencing frequent nighttime asthma symptoms.  Asthma information handout given.  Discussed monitoring symptoms and use of quick-relief medications and contacting us early in the course of exacerbations.  Warning signs of respiratory distress were reviewed with the patient.   Medications: continue Singulair every evening and albuterol inhaler as needed for rescue inhaler and begin Symbicort inhaler twice daily. Advised patient to rinse mouth after each use of the Symbicort inhaler. She was concerned about it having a steroid but I explained to her this is an inhaled steroid and will not cause pancreatitis.  Discussed medication dosage, use, side effects, and goals of treatment in detail.    Follow up in 2 weeks, or sooner should new symptoms or problems arise.

## 2022-02-09 NOTE — PATIENT INSTRUCTIONS
Asthma, Adult    Asthma is a long-term (chronic) condition in which the airways get tight and narrow. The airways are the breathing passages that lead from the nose and mouth down into the lungs. A person with asthma will have times when symptoms get worse. These are called asthma attacks. They can cause coughing, whistling sounds when you breathe (wheezing), shortness of breath, and chest pain. They can make it hard to breathe. There is no cure for asthma, but medicines and lifestyle changes can help control it.  There are many things that can bring on an asthma attack or make asthma symptoms worse (triggers). Common triggers include:  · Mold.  · Dust.  · Cigarette smoke.  · Cockroaches.  · Things that can cause allergy symptoms (allergens). These include animal skin flakes (dander) and pollen from trees or grass.  · Things that pollute the air. These may include household , wood smoke, smog, or chemical odors.  · Cold air, weather changes, and wind.  · Crying or laughing hard.  · Stress.  · Certain medicines or drugs.  · Certain foods such as dried fruit, potato chips, and grape juice.  · Infections, such as a cold or the flu.  · Certain medical conditions or diseases.  · Exercise or tiring activities.  Asthma may be treated with medicines and by staying away from the things that cause asthma attacks. Types of medicines may include:  · Controller medicines. These help prevent asthma symptoms. They are usually taken every day.  · Fast-acting reliever or rescue medicines. These quickly relieve asthma symptoms. They are used as needed and provide short-term relief.  · Allergy medicines if your attacks are brought on by allergens.  · Medicines to help control the body's defense (immune) system.  Follow these instructions at home:  Avoiding triggers in your home  · Change your heating and air conditioning filter often.  · Limit your use of fireplaces and wood stoves.  · Get rid of pests (such as roaches and  mice) and their droppings.  · Throw away plants if you see mold on them.  · Clean your floors. Dust regularly. Use cleaning products that do not smell.  · Have someone vacuum when you are not home. Use a vacuum  with a HEPA filter if possible.  · Replace carpet with wood, tile, or vinyl khang. Carpet can trap animal skin flakes and dust.  · Use allergy-proof pillows, mattress covers, and box spring covers.  · Wash bed sheets and blankets every week in hot water. Dry them in a dryer.  · Keep your bedroom free of any triggers.  · Avoid pets and keep windows closed when things that cause allergy symptoms are in the air.  · Use blankets that are made of polyester or cotton.  · Clean bathrooms and lluvia with bleach. If possible, have someone repaint the walls in these rooms with mold-resistant paint. Keep out of the rooms that are being cleaned and painted.  · Wash your hands often with soap and water. If soap and water are not available, use hand .  · Do not allow anyone to smoke in your home.  General instructions  · Take over-the-counter and prescription medicines only as told by your doctor.  ? Talk with your doctor if you have questions about how or when to take your medicines.  ? Make note if you need to use your medicines more often than usual.  · Do not use any products that contain nicotine or tobacco, such as cigarettes and e-cigarettes. If you need help quitting, ask your doctor.  · Stay away from secondhand smoke.  · Avoid doing things outdoors when allergen counts are high and when air quality is low.  · Wear a ski mask when doing outdoor activities in the winter. The mask should cover your nose and mouth. Exercise indoors on cold days if you can.  · Warm up before you exercise. Take time to cool down after exercise.  · Use a peak flow meter as told by your doctor. A peak flow meter is a tool that measures how well the lungs are working.  · Keep track of the peak flow meter's readings.  Write them down.  · Follow your asthma action plan. This is a written plan for taking care of your asthma and treating your attacks.  · Make sure you get all the shots (vaccines) that your doctor recommends. Ask your doctor about a flu shot and a pneumonia shot.  · Keep all follow-up visits as told by your doctor. This is important.  Contact a doctor if:  · You have wheezing, shortness of breath, or a cough even while taking medicine to prevent attacks.  · The mucus you cough up (sputum) is thicker than usual.  · The mucus you cough up changes from clear or white to yellow, green, gray, or bloody.  · You have problems from the medicine you are taking, such as:  ? A rash.  ? Itching.  ? Swelling.  ? Trouble breathing.  · You need reliever medicines more than 2-3 times a week.  · Your peak flow reading is still at 50-79% of your personal best after following the action plan for 1 hour.  · You have a fever.  Get help right away if:  · You seem to be worse and are not responding to medicine during an asthma attack.  · You are short of breath even at rest.  · You get short of breath when doing very little activity.  · You have trouble eating, drinking, or talking.  · You have chest pain or tightness.  · You have a fast heartbeat.  · Your lips or fingernails start to turn blue.  · You are light-headed or dizzy, or you faint.  · Your peak flow is less than 50% of your personal best.  · You feel too tired to breathe normally.  Summary  · Asthma is a long-term (chronic) condition in which the airways get tight and narrow. An asthma attack can make it hard to breathe.  · Asthma cannot be cured, but medicines and lifestyle changes can help control it.  · Make sure you understand how to avoid triggers and how and when to use your medicines.  This information is not intended to replace advice given to you by your health care provider. Make sure you discuss any questions you have with your health care provider.  Document Revised:  04/21/2021 Document Reviewed: 04/21/2021  [employer]vier Patient Education © 2021 [employer]

## 2022-02-10 NOTE — PROGRESS NOTES
I tried to call patient last night but no answer. Please call patient with positive Covid results.  Discuss with patient to quarantine for at least 10 days from onset of symptoms and symptoms have resolved.  Instruct patient not to go anywhere except to seek medical care.  Instruct patient to seek medical care for any difficulty of breathing, unable to keep fluids down, or worsening of symptoms.

## 2022-02-15 ENCOUNTER — TELEPHONE (OUTPATIENT)
Dept: CARDIOLOGY | Facility: CLINIC | Age: 57
End: 2022-02-15

## 2022-02-15 NOTE — TELEPHONE ENCOUNTER
Received VM from patient.     Returned call. Patient stated that she was diagnosed with COVID and wanted to know if she should have her echo repeated. Advised there was no indication to repeat echo at this time. Call for any new or worsening symptoms.

## 2022-02-16 RX ORDER — LEVOCETIRIZINE DIHYDROCHLORIDE 5 MG/1
TABLET, FILM COATED ORAL
Qty: 30 TABLET | Refills: 0 | Status: SHIPPED | OUTPATIENT
Start: 2022-02-16 | End: 2022-03-16

## 2022-02-18 ENCOUNTER — OFFICE VISIT (OUTPATIENT)
Dept: FAMILY MEDICINE CLINIC | Facility: CLINIC | Age: 57
End: 2022-02-18

## 2022-02-18 VITALS
BODY MASS INDEX: 34.25 KG/M2 | HEIGHT: 67 IN | SYSTOLIC BLOOD PRESSURE: 146 MMHG | HEART RATE: 91 BPM | WEIGHT: 218.2 LBS | OXYGEN SATURATION: 98 % | TEMPERATURE: 98.5 F | DIASTOLIC BLOOD PRESSURE: 96 MMHG

## 2022-02-18 DIAGNOSIS — D64.9 ANEMIA, UNSPECIFIED TYPE: ICD-10-CM

## 2022-02-18 DIAGNOSIS — I10 ESSENTIAL HYPERTENSION: ICD-10-CM

## 2022-02-18 DIAGNOSIS — E53.8 VITAMIN B12 DEFICIENCY: ICD-10-CM

## 2022-02-18 DIAGNOSIS — F41.1 GENERALIZED ANXIETY DISORDER: ICD-10-CM

## 2022-02-18 DIAGNOSIS — Z86.16 HISTORY OF COVID-19: Primary | ICD-10-CM

## 2022-02-18 DIAGNOSIS — R91.1 LUNG NODULE: ICD-10-CM

## 2022-02-18 LAB
ALBUMIN SERPL-MCNC: 3.8 G/DL (ref 3.5–5.2)
ALBUMIN/GLOB SERPL: 1.3 G/DL
ALP SERPL-CCNC: 85 U/L (ref 39–117)
ALT SERPL W P-5'-P-CCNC: 13 U/L (ref 1–33)
ANION GAP SERPL CALCULATED.3IONS-SCNC: 9.1 MMOL/L (ref 5–15)
AST SERPL-CCNC: 19 U/L (ref 1–32)
BASOPHILS # BLD AUTO: 0.04 10*3/MM3 (ref 0–0.2)
BASOPHILS NFR BLD AUTO: 0.6 % (ref 0–1.5)
BILIRUB SERPL-MCNC: 0.5 MG/DL (ref 0–1.2)
BUN SERPL-MCNC: 23 MG/DL (ref 6–20)
BUN/CREAT SERPL: 29.9 (ref 7–25)
CALCIUM SPEC-SCNC: 9.7 MG/DL (ref 8.6–10.5)
CHLORIDE SERPL-SCNC: 103 MMOL/L (ref 98–107)
CHOLEST SERPL-MCNC: 185 MG/DL (ref 0–200)
CO2 SERPL-SCNC: 24.9 MMOL/L (ref 22–29)
CREAT SERPL-MCNC: 0.77 MG/DL (ref 0.57–1)
DEPRECATED RDW RBC AUTO: 44.8 FL (ref 37–54)
EOSINOPHIL # BLD AUTO: 0.24 10*3/MM3 (ref 0–0.4)
EOSINOPHIL NFR BLD AUTO: 3.5 % (ref 0.3–6.2)
ERYTHROCYTE [DISTWIDTH] IN BLOOD BY AUTOMATED COUNT: 13 % (ref 12.3–15.4)
FERRITIN SERPL-MCNC: 39.5 NG/ML (ref 13–150)
GFR SERPL CREATININE-BSD FRML MDRD: 78 ML/MIN/1.73
GLOBULIN UR ELPH-MCNC: 3 GM/DL
GLUCOSE SERPL-MCNC: 84 MG/DL (ref 65–99)
HCT VFR BLD AUTO: 38.8 % (ref 34–46.6)
HDLC SERPL-MCNC: 66 MG/DL (ref 40–60)
HGB BLD-MCNC: 12.7 G/DL (ref 12–15.9)
IMM GRANULOCYTES # BLD AUTO: 0.02 10*3/MM3 (ref 0–0.05)
IMM GRANULOCYTES NFR BLD AUTO: 0.3 % (ref 0–0.5)
IRON 24H UR-MRATE: 119 MCG/DL (ref 37–145)
IRON SATN MFR SERPL: 27 % (ref 20–50)
LDLC SERPL CALC-MCNC: 99 MG/DL (ref 0–100)
LDLC/HDLC SERPL: 1.45 {RATIO}
LYMPHOCYTES # BLD AUTO: 1.33 10*3/MM3 (ref 0.7–3.1)
LYMPHOCYTES NFR BLD AUTO: 19.5 % (ref 19.6–45.3)
MCH RBC QN AUTO: 30.8 PG (ref 26.6–33)
MCHC RBC AUTO-ENTMCNC: 32.7 G/DL (ref 31.5–35.7)
MCV RBC AUTO: 93.9 FL (ref 79–97)
MONOCYTES # BLD AUTO: 0.47 10*3/MM3 (ref 0.1–0.9)
MONOCYTES NFR BLD AUTO: 6.9 % (ref 5–12)
NEUTROPHILS NFR BLD AUTO: 4.71 10*3/MM3 (ref 1.7–7)
NEUTROPHILS NFR BLD AUTO: 69.2 % (ref 42.7–76)
NRBC BLD AUTO-RTO: 0 /100 WBC (ref 0–0.2)
PLATELET # BLD AUTO: 323 10*3/MM3 (ref 140–450)
PMV BLD AUTO: 9.9 FL (ref 6–12)
POTASSIUM SERPL-SCNC: 4 MMOL/L (ref 3.5–5.2)
PROT SERPL-MCNC: 6.8 G/DL (ref 6–8.5)
RBC # BLD AUTO: 4.13 10*6/MM3 (ref 3.77–5.28)
SODIUM SERPL-SCNC: 137 MMOL/L (ref 136–145)
T4 FREE SERPL-MCNC: 0.94 NG/DL (ref 0.93–1.7)
TIBC SERPL-MCNC: 441 MCG/DL (ref 298–536)
TRANSFERRIN SERPL-MCNC: 296 MG/DL (ref 200–360)
TRIGL SERPL-MCNC: 115 MG/DL (ref 0–150)
TSH SERPL DL<=0.05 MIU/L-ACNC: 2.04 UIU/ML (ref 0.27–4.2)
VLDLC SERPL-MCNC: 20 MG/DL (ref 5–40)
WBC NRBC COR # BLD: 6.81 10*3/MM3 (ref 3.4–10.8)

## 2022-02-18 PROCEDURE — 85025 COMPLETE CBC W/AUTO DIFF WBC: CPT | Performed by: NURSE PRACTITIONER

## 2022-02-18 PROCEDURE — 80053 COMPREHEN METABOLIC PANEL: CPT | Performed by: NURSE PRACTITIONER

## 2022-02-18 PROCEDURE — 83540 ASSAY OF IRON: CPT | Performed by: NURSE PRACTITIONER

## 2022-02-18 PROCEDURE — 84443 ASSAY THYROID STIM HORMONE: CPT | Performed by: NURSE PRACTITIONER

## 2022-02-18 PROCEDURE — 82728 ASSAY OF FERRITIN: CPT | Performed by: NURSE PRACTITIONER

## 2022-02-18 PROCEDURE — 84466 ASSAY OF TRANSFERRIN: CPT | Performed by: NURSE PRACTITIONER

## 2022-02-18 PROCEDURE — 84439 ASSAY OF FREE THYROXINE: CPT | Performed by: NURSE PRACTITIONER

## 2022-02-18 PROCEDURE — 80061 LIPID PANEL: CPT | Performed by: NURSE PRACTITIONER

## 2022-02-18 PROCEDURE — 99214 OFFICE O/P EST MOD 30 MIN: CPT | Performed by: NURSE PRACTITIONER

## 2022-02-18 NOTE — ASSESSMENT & PLAN NOTE
Hypertension is Elevated today, but stable.  Continue current treatment regimen.  Continue current medications.  Patient will continue to follow with cardiology.  Blood pressure will be reassessed at the next regular appointment.

## 2022-02-18 NOTE — ASSESSMENT & PLAN NOTE
Advised her that we only have the Covid PCR test here which will still test positive.  Advised that she can go to urgent care and have a rapid test done if she wants to check.

## 2022-02-18 NOTE — PROGRESS NOTES
"Chief Complaint  Shortness of Breath and Nasal Congestion    Subjective          Esme Mcneil presents to [facility]  History of Present Illness   56-year-old female presents today to follow-up from recent Covid infection.  She states she is feeling much better.  She is wanting to have another Covid test to see if she still has Covid.  She does not work anywhere but just wants to know if she still has Covid.  She states she was going to do something tomorrow so would like to know.  She states she did not get the Symbicort inhaler because her insurance did not cover it.  She states the albuterol inhaler made her feel very anxious after taking it.  She also states she was having some anxiety that was more than normal for her while having Covid.    She also states she did not get her chest x-ray because she had Covid and she was feeling better.  She states that she has a history of a lung nodule and has had CT scans in the past.    Hypertension: Her blood pressure is noted to be elevated today on arrival.  Blood pressure was rechecked and was 146/96.  She has been following with cardiology and states she was started on spironolactone 25 mg daily.  She denies taking hydrochlorothiazide.  She also states she stopped clonidine because it was given to her to help her sleep but made her too drowsy so she stopped taking it.    She has a history of iron deficiency anemia and vitamin B12 deficiency.  She is wanting to have her labs checked today.    Anxiety/depression: She stopped taking Trintellix because she states that it made her have jerking motion.  She states the jerking motions stopped after she stopped Trintellix.  She was supposed to see psychiatry but missed her appointment due to having Covid.  Advised patient she needs to reschedule her appointment.  Objective   Vital Signs:   /96   Pulse 91   Temp 98.5 °F (36.9 °C)   Ht 168.9 cm (66.5\")   Wt 99 kg (218 lb 3.2 oz) "   SpO2 98%   BMI 34.69 kg/m²     Physical Exam  Vitals reviewed.   Constitutional:       Appearance: Normal appearance. She is well-developed.   Neck:      Thyroid: No thyroid mass, thyromegaly or thyroid tenderness.   Cardiovascular:      Rate and Rhythm: Normal rate and regular rhythm.      Heart sounds: No murmur heard.  No friction rub. No gallop.    Pulmonary:      Effort: Pulmonary effort is normal.      Breath sounds: Normal breath sounds. No wheezing or rhonchi.   Lymphadenopathy:      Cervical: No cervical adenopathy.   Skin:     General: Skin is warm and dry.   Neurological:      Mental Status: She is alert and oriented to person, place, and time.      Cranial Nerves: No cranial nerve deficit.   Psychiatric:         Mood and Affect: Mood and affect normal.         Behavior: Behavior normal.         Thought Content: Thought content normal. Thought content does not include homicidal or suicidal ideation.         Judgment: Judgment normal.        Result Review :                 Assessment and Plan    Diagnoses and all orders for this visit:    1. History of COVID-19 (Primary)  Assessment & Plan:  Advised her that we only have the Covid PCR test here which will still test positive.  Advised that she can go to urgent care and have a rapid test done if she wants to check.      2. Lung nodule  Assessment & Plan:  Personal history of lung nodule newly identified, will order CT of the chest for follow-up.    Orders:  -     CT Chest Without Contrast; Future    3. Essential hypertension  Assessment & Plan:  Hypertension is Elevated today, but stable.  Continue current treatment regimen.  Continue current medications.  Patient will continue to follow with cardiology.  Blood pressure will be reassessed at the next regular appointment.    Orders:  -     Comprehensive Metabolic Panel  -     Lipid Panel    4. Vitamin B12 deficiency  -     Vitamin B12 & Folate    5. Anemia, unspecified type  Assessment & Plan:  We will  check labs today.    Orders:  -     CBC Auto Differential  -     Iron Profile  -     Ferritin  -     Vitamin B12 & Folate    6. Generalized anxiety disorder  Assessment & Plan:  Patient missed her appointment with psychiatry, advised to call and reschedule.    Orders:  -     TSH+Free T4      Follow Up   Return in about 3 months (around 5/18/2022) for Next scheduled follow up.  Patient was given instructions and counseling regarding her condition or for health maintenance advice. Please see specific information pulled into the AVS if appropriate.

## 2022-02-21 ENCOUNTER — LAB (OUTPATIENT)
Dept: FAMILY MEDICINE CLINIC | Facility: CLINIC | Age: 57
End: 2022-02-21

## 2022-02-21 DIAGNOSIS — E53.8 VITAMIN B12 DEFICIENCY: Primary | ICD-10-CM

## 2022-02-21 PROCEDURE — 82607 VITAMIN B-12: CPT | Performed by: NURSE PRACTITIONER

## 2022-02-21 PROCEDURE — 82746 ASSAY OF FOLIC ACID SERUM: CPT | Performed by: NURSE PRACTITIONER

## 2022-02-21 PROCEDURE — 36415 COLL VENOUS BLD VENIPUNCTURE: CPT | Performed by: NURSE PRACTITIONER

## 2022-02-22 LAB
FOLATE SERPL-MCNC: >20 NG/ML (ref 4.78–24.2)
VIT B12 BLD-MCNC: 549 PG/ML (ref 211–946)

## 2022-02-23 ENCOUNTER — TELEPHONE (OUTPATIENT)
Dept: PSYCHIATRY | Facility: CLINIC | Age: 57
End: 2022-02-23

## 2022-02-25 ENCOUNTER — OFFICE VISIT (OUTPATIENT)
Dept: BEHAVIORAL HEALTH | Facility: CLINIC | Age: 57
End: 2022-02-25

## 2022-02-25 VITALS
BODY MASS INDEX: 33.9 KG/M2 | SYSTOLIC BLOOD PRESSURE: 130 MMHG | WEIGHT: 216 LBS | HEIGHT: 67 IN | DIASTOLIC BLOOD PRESSURE: 90 MMHG

## 2022-02-25 DIAGNOSIS — F29 PSYCHOSIS, UNSPECIFIED PSYCHOSIS TYPE: ICD-10-CM

## 2022-02-25 DIAGNOSIS — G47.00 INSOMNIA, UNSPECIFIED TYPE: ICD-10-CM

## 2022-02-25 DIAGNOSIS — F39 MOOD DISORDER: Primary | ICD-10-CM

## 2022-02-25 PROCEDURE — 90792 PSYCH DIAG EVAL W/MED SRVCS: CPT | Performed by: PHYSICIAN ASSISTANT

## 2022-02-25 RX ORDER — ARIPIPRAZOLE 2 MG/1
TABLET ORAL
Qty: 30 TABLET | Refills: 1 | Status: SHIPPED | OUTPATIENT
Start: 2022-02-25 | End: 2022-03-14

## 2022-02-25 NOTE — PROGRESS NOTES
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 05/23/2023   Final   Lab on 11/09/2021   Component Date Value Ref Range Status   • Glucose 11/09/2021 85  65 - 99 mg/dL Final   • BUN 11/09/2021 23* 6 - 20 mg/dL Final   • Creatinine 11/09/2021 0.59  0.57 - 1.00 mg/dL Final   • Sodium 11/09/2021 142  136 - 145 mmol/L Final   • Potassium 11/09/2021 4.1  3.5 - 5.2 mmol/L Final   • Chloride 11/09/2021 109* 98 - 107 mmol/L Final   • CO2 11/09/2021 27.2  22.0 - 29.0 mmol/L Final   • Calcium 11/09/2021 8.9  8.6 - 10.5 mg/dL Final   • eGFR Non African Amer 11/09/2021 106  >60 mL/min/1.73 Final   • BUN/Creatinine Ratio 11/09/2021 39.0* 7.0 - 25.0 Final   • Anion Gap 11/09/2021 5.8  5.0 - 15.0 mmol/L Final   Admission on 10/30/2021, Discharged on 10/30/2021   Component Date Value Ref Range Status   • SARS Antigen 10/30/2021 Not Detected  Not Detected Final   • Internal Control 10/30/2021 Passed  Passed Final   • Lot Number 10/30/2021 [id number]   Final   • Expiration Date 10/30/2021 02/14/2023   Final   Office Visit on 10/25/2021   Component Date Value Ref Range Status   • Glucose, UA 10/25/2021 Negative  Negative, 1000 mg/dL (3+) mg/dL Final   • Protein, POC 10/25/2021 Negative  Negative mg/dL Final   • Diagnosis 10/25/2021 Comment*  Final    EPITHELIAL CELL ABNORMALITY.  ATYPICAL SQUAMOUS  CELLS OF UNDETERMINED SIGNIFICANCE (ASC-US).  CELLULAR CHANGES ASSOCIATED WITH ATROPHY ARE PRESENT.   • Specimen adequacy: 10/25/2021 Comment   Final    Satisfactory for evaluation.  Endocervical and/or squamous metaplastic  cells (endocervical component) are present.   • Clinician Provided ICD-10: 10/25/2021 Comment   Final    Z01.419   • Performed by: 10/25/2021 Comment   Final    Day Gonzales, Cytotechnologist (ASCP)   • Electronically signed by: 10/25/2021 Comment   Final    Tea Couch MD, Pathologist   • . 10/25/2021 .   Final   • Pathologist Provided ICD-10: 10/25/2021 Comment   Final    R87.610   • Note: 10/25/2021 Comment   Final    The Pap smear is a screening test designed to aid in the detection of  premalignant and malignant conditions of the uterine cervix.  It is not a  diagnostic procedure and should not be used as the sole means of detecting  cervical cancer.  Both false-positive and false-negative reports do occur.   • Method: 10/25/2021 Comment   Final    This liquid based ThinPrep(R) pap test was screened with the  use of an image guided system.   • Conv .conv 10/25/2021 Comment   Final    See below for HPV testing results.   • HPV Aptima 10/25/2021 Negative  Negative Final    This nucleic acid amplification test detects fourteen high-risk  HPV types (16,18,31,33,35,39,45,51,52,56,58,59,66,68) without  differentiation.   There may be more visits with results that are not included.       EKG Results:  No orders to display       Imaging Results:  No Images in the past 120 days found..      Assessment/Plan   Diagnoses and all orders for this visit:    1. Mood disorder (HCC) (Primary)  -     ARIPiprazole (Abilify) 2 MG tablet; Take 0.5 tab PO QHS x 2 weeks, if tolerated take 1 tab PO QHS  Dispense: 30 tablet; Refill: 1    2. Psychosis, unspecified psychosis type (HCC)  -     ARIPiprazole (Abilify) 2 MG tablet; Take 0.5 tab PO QHS x 2 weeks, if tolerated take 1 tab PO QHS  Dispense: 30 tablet;  Refill: 1    3. Insomnia, unspecified type  -     ARIPiprazole (Abilify) 2 MG tablet; Take 0.5 tab PO QHS x 2 weeks, if tolerated take 1 tab PO QHS  Dispense: 30 tablet; Refill: 1    Presentation seems most consistent with mood disorder, psychosis, and insomnia. Pt appears to have bizarre delusions, although at this time it is not clear if this is secondary to MDD or other mood disorder.  Consider substance-induced mood disorder as well.  We will start the patient on Abilify to target mood disorder, psychosis, and overall mood.  Discussed with the patient plan to start her on only one medication at a time and to start very slowly as she has tried and failed multiple medications in the past with frequent side effects.  Discussed that I will not be prescribing a controlled substance for sleep and counseled the patient on the need to take only her medications and to not take anyone else's medications.  Patient verbalized understanding and is agreeable to this plan.  Follow-up in 1 month.  Addressed all questions and concerns.    Visit Diagnoses:    ICD-10-CM ICD-9-CM   1. Mood disorder ([facility])  F39 296.90   2. Psychosis, unspecified psychosis type ([facility])  F29 298.9   3. Insomnia, unspecified type  G47.00 780.52       PLAN:  1. Safety: No acute safety concerns. I did  the patient on the risk of taking other people's medications and to not take anyone else's medications. Pt verbalized understanding and is agreeable to this.   2. Therapy: Pt declines at this time.   3. Risk Assessment: Risk of self-harm acutely is severe.  Risk factors include anxiety disorder, mood disorder, family history of daughter with suicide attempt, h/o suicide attempts and self harm in the past, AODA, and recent psychosocial stressors (pandemic). Protective factors include denies access to guns/weapons, no present SI, healthcare seeking, future orientation, willingness to engage in care.  Risk of self-harm chronically is also severe, but  could be further elevated in the event of treatment noncompliance and/or AODA.  4. Labs/Diagnostics Ordered:   No orders of the defined types were placed in this encounter.    5. Medications:   New Medications Ordered This Visit   Medications   • ARIPiprazole (Abilify) 2 MG tablet     Sig: Take 0.5 tab PO QHS x 2 weeks, if tolerated take 1 tab PO QHS     Dispense:  30 tablet     Refill:  1       Discussed all risks, benefits, alternatives, and side effects of Aripiprazole, including but not limited to GI upset, headache, activating/sedating effects, dyslipidemia, extrapyramidal symptoms (dystonia, drug-induced parkinsonism, akathisia, tardive dyskinesia), lowering of seizure threshold, hematologic abnormalities, hyperglycemia, impulse control disorders, increased mortality in elderly patients with dementia-related psychosis, neuroleptic malignant syndrome, sexual dysfunction, orthostatic hypotension, falls risk in older adults, and temperature dysregulation. Pt instructed to avoid driving and doing other tasks or actions that require to be alert until knowing how the drug affects them. Discussed the need for pt to immediately call the office for any new or worsening symptoms, such as worsening depression; feeling nervous or restless; suicidal thoughts or actions; or other changes changes in mood or behavior, and all other concerns. Pt educated on med compliance and the risks of suddenly stopping this medication or missing doses. Pt verbalized understanding and is agreeable to taking Aripiprazole. Addressed all questions and concerns.       6. Follow up:   F/u in one month.     TREATMENT PLAN/GOALS: Continue supportive psychotherapy efforts and medications as indicated. Treatment and medication options discussed during today's visit. Patient ackowledged and verbally consented to continue with current treatment plan and was educated on the importance of compliance with treatment and follow-up  appointments.    MEDICATION ISSUES:  DION reviewed as expected.  Discussed medication options and treatment plan of prescribed medication as well as the risks, benefits, and side effects including potential falls, possible impaired driving and metabolic adversities among others. Patient is agreeable to call the office with any worsening of symptoms or onset of side effects. Patient is agreeable to call 911 or go to the nearest ER should he/she begin having SI/HI. No medication side effects or related complaints today.            This document has been electronically signed by Jyoti Samuels PA-C  February 28, 2022 09:26 EST      Part of this note may be an electronic transcription/translation of spoken language to printed text using the Dragon Dictation System.

## 2022-02-28 ENCOUNTER — PATIENT ROUNDING (BHMG ONLY) (OUTPATIENT)
Dept: BEHAVIORAL HEALTH | Facility: CLINIC | Age: 57
End: 2022-02-28

## 2022-02-28 NOTE — PROGRESS NOTES
February 28, 2022    Hello, may I speak with Esme Mcneil?    My name is Hoa      I am  with [facility]  [address] [place] [id number].    Before we get started may I verify your date of birth? 1965    I am calling to officially welcome you to our practice and ask about your recent visit. Is this a good time to talk? No, left voicemail to why I was calling    Tell me about your visit with us. What things went well? N/A        We're always looking for ways to make our patients' experiences even better. Do you have recommendations on ways we may improve? N/A    Overall were you satisfied with your first visit to our practice? N/A       I appreciate you taking the time to speak with me today. Is there anything else I can do for you? N/A      Thank you, and have a great day.

## 2022-03-07 ENCOUNTER — TELEPHONE (OUTPATIENT)
Dept: FAMILY MEDICINE CLINIC | Facility: CLINIC | Age: 57
End: 2022-03-07

## 2022-03-14 ENCOUNTER — TELEPHONE (OUTPATIENT)
Dept: PSYCHIATRY | Facility: CLINIC | Age: 57
End: 2022-03-14

## 2022-03-14 DIAGNOSIS — G47.00 INSOMNIA, UNSPECIFIED TYPE: ICD-10-CM

## 2022-03-14 DIAGNOSIS — F41.1 GENERALIZED ANXIETY DISORDER: Primary | ICD-10-CM

## 2022-03-14 DIAGNOSIS — F33.1 MAJOR DEPRESSIVE DISORDER, RECURRENT EPISODE, MODERATE: ICD-10-CM

## 2022-03-14 RX ORDER — QUETIAPINE FUMARATE 50 MG/1
TABLET, FILM COATED ORAL
Qty: 60 TABLET | Refills: 0 | Status: SHIPPED | OUTPATIENT
Start: 2022-03-14 | End: 2022-03-21 | Stop reason: SDUPTHER

## 2022-03-14 NOTE — TELEPHONE ENCOUNTER
Pt took 1mg of abilify had a HA, nausea, and insomnia. Pt states she was not prescribed seroquel in the past and her brother gave her half of his seroquel tablet to sleep since she had insomnia at that time.  Patient does not recall exactly what happened with the Seroquel dose, but reviewing past notes it appears that patient reported it made her very sleepy and groggy.  Discussed that she was not actually prescribed this and did not appear to have side effects.  We will discontinue Abilify due to not tolerating this medication.  Discussed medication options and patient is amicable to trying Seroquel.  We will start the patient on Seroquel to target depression, anxiety, delusional disorder, likely OCD, insomnia, and overall mood.  Patient has upcoming scheduled appointment with me this Friday.  Addressed all questions and concerns.

## 2022-03-16 RX ORDER — LEVOCETIRIZINE DIHYDROCHLORIDE 5 MG/1
TABLET, FILM COATED ORAL
Qty: 30 TABLET | Refills: 0 | Status: SHIPPED | OUTPATIENT
Start: 2022-03-16 | End: 2022-04-19

## 2022-03-18 ENCOUNTER — OFFICE VISIT (OUTPATIENT)
Dept: BEHAVIORAL HEALTH | Facility: CLINIC | Age: 57
End: 2022-03-18

## 2022-03-18 VITALS
SYSTOLIC BLOOD PRESSURE: 140 MMHG | DIASTOLIC BLOOD PRESSURE: 90 MMHG | WEIGHT: 219 LBS | HEIGHT: 67 IN | BODY MASS INDEX: 34.37 KG/M2

## 2022-03-18 DIAGNOSIS — F39 MOOD DISORDER: Primary | ICD-10-CM

## 2022-03-18 DIAGNOSIS — G47.00 INSOMNIA, UNSPECIFIED TYPE: ICD-10-CM

## 2022-03-18 DIAGNOSIS — F29 PSYCHOSIS, UNSPECIFIED PSYCHOSIS TYPE: ICD-10-CM

## 2022-03-18 PROCEDURE — 99213 OFFICE O/P EST LOW 20 MIN: CPT | Performed by: PHYSICIAN ASSISTANT

## 2022-03-18 NOTE — PROGRESS NOTES
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  No orders to display       Imaging Results:  No Images in the past 120 days found..      Assessment/Plan   Diagnoses and all orders for this visit:    1. Mood disorder (HCC) (Primary)  -     QUEtiapine (SEROquel) 50 MG tablet; Take 0.5 tab PO QHS for sleep. Can take 0.5 tab PO one hour later if needed. Max 100mg QHS  Dispense: 60 tablet; Refill: 0    2. Psychosis, unspecified psychosis type (HCC)  -     QUEtiapine (SEROquel) 50 MG tablet; Take 0.5 tab PO QHS for sleep. Can take 0.5 tab PO one hour later if needed. Max 100mg QHS  Dispense: 60 tablet; Refill: 0    3. Insomnia, unspecified type  -     QUEtiapine (SEROquel) 50 MG tablet; Take 0.5 tab PO QHS for sleep. Can take 0.5 tab PO one hour later if needed. Max 100mg QHS  Dispense: 60 tablet; Refill: 0    Presentation seems most consistent with mood disorder, psychosis, and insomnia. Pt appears to have bizarre delusions, although at this time it is not clear if this is secondary to MDD or other mood disorder.  Consider substance-induced mood disorder as well.  Patient is tolerating Seroquel well although just recently started this medication.  Patient plans on continuing this medication.  We will continue Seroquel at current dose for mood,  depression, anxiety, psychosis, and overall mood.  Follow-up in 4 weeks.  Addressed all questions and concerns.  Counseled the patient on the need to be compliant with medication and to consistently take the Seroquel.    Visit Diagnoses:    ICD-10-CM ICD-9-CM   1. Mood disorder (HCC)  F39 296.90   2. Psychosis, unspecified psychosis type (HCC)  F29 298.9   3. Insomnia, unspecified type  G47.00 780.52       PLAN:  1. Safety: No acute safety concerns. I did  the patient on the risk of taking other people's medications and to not take anyone else's medications. Pt verbalized understanding and is agreeable to this.   2. Therapy: Pt declines at this time.   3. Risk Assessment: Risk of self-harm acutely is severe.  Risk factors include anxiety disorder, mood disorder, family history of daughter with suicide attempt, h/o suicide attempts and self harm in the past, AODA, and recent psychosocial stressors (pandemic). Protective factors include denies access to guns/weapons, no present SI, healthcare seeking, future orientation, willingness to engage in care.  Risk of self-harm chronically is also severe, but could be further elevated in the event of treatment noncompliance and/or AODA.  4. Labs/Diagnostics Ordered:   No orders of the defined types were placed in this encounter.    5. Medications:   New Medications Ordered This Visit   Medications   • QUEtiapine (SEROquel) 50 MG tablet     Sig: Take 0.5 tab PO QHS for sleep. Can take 0.5 tab PO one hour later if needed. Max 100mg QHS     Dispense:  60 tablet     Refill:  0       Discussed all risks, benefits, alternatives, and side effects of Quetiapine, including but not limited to GI upset, agitation, dizziness, headache, sexual dysfunction, sedation, weight gain, anticholinergic effects, cataract risk, dyslipidemia, extrapyramidal symptoms (dystonia, drug-induced parkinsonism, akathisia, tardive dyskinesia), lowering of seizure threshold, hematologic abnormalities,  hyperglycemia, hypothyroidism, increased mortality in elderly patients with dementia-related psychosis, neuroleptic malignant syndrome, orthostatic hypotension, falls risk in older adults, prolonged QT interval, and temperature dysregulation. Pt instructed to avoid driving and doing other tasks or actions that require to be alert until knowing how the drug affects them. Pt educated on the need to practice safe sex while taking this med. Discussed the need for pt to immediately call the office for any new or worsening symptoms, such as worsening depression; feeling nervous or restless; suicidal thoughts or actions; or other changes changes in mood or behavior, and all other concerns. Pt educated on med compliance and the risks of suddenly stopping this medication or missing doses. Pt verbalized understanding and is agreeable to taking Quetiapine. Addressed all questions and concerns.       6. Follow up:   F/u in one month.     TREATMENT PLAN/GOALS: Continue supportive psychotherapy efforts and medications as indicated. Treatment and medication options discussed during today's visit. Patient ackowledged and verbally consented to continue with current treatment plan and was educated on the importance of compliance with treatment and follow-up appointments.    MEDICATION ISSUES:  DION reviewed as expected.  Discussed medication options and treatment plan of prescribed medication as well as the risks, benefits, and side effects including potential falls, possible impaired driving and metabolic adversities among others. Patient is agreeable to call the office with any worsening of symptoms or onset of side effects. Patient is agreeable to call 911 or go to the nearest ER should he/she begin having SI/HI. No medication side effects or related complaints today.            This document has been electronically signed by Jyoti Samuels PA-C  March 21, 2022 17:01 EDT      Part of this note may be an electronic  transcription/translation of spoken language to printed text using the Dragon Dictation System.

## 2022-03-21 RX ORDER — QUETIAPINE FUMARATE 50 MG/1
TABLET, FILM COATED ORAL
Qty: 60 TABLET | Refills: 0 | Status: SHIPPED | OUTPATIENT
Start: 2022-03-21 | End: 2022-04-01

## 2022-03-22 ENCOUNTER — TELEPHONE (OUTPATIENT)
Dept: FAMILY MEDICINE CLINIC | Facility: CLINIC | Age: 57
End: 2022-03-22

## 2022-03-22 NOTE — TELEPHONE ENCOUNTER
Caller: Esme Mcneil    Relationship: Self    Best call back number: [phone]    What is the best time to reach you: ANY    Who are you requesting to speak with (clinical staff, provider,  specific staff member): PONCHO DAVIS    Do you know the name of the person who called: PONCHO DAVIS    What was the call regarding: MEDICATION ISSUE, PATIENT WOULD NOT GO INTO DETAILS    Do you require a callback: YES

## 2022-03-24 ENCOUNTER — TELEPHONE (OUTPATIENT)
Dept: PSYCHIATRY | Facility: CLINIC | Age: 57
End: 2022-03-24

## 2022-03-24 ENCOUNTER — OFFICE VISIT (OUTPATIENT)
Dept: CARDIOLOGY | Facility: CLINIC | Age: 57
End: 2022-03-24

## 2022-03-24 VITALS
HEIGHT: 67 IN | BODY MASS INDEX: 34.06 KG/M2 | HEART RATE: 81 BPM | SYSTOLIC BLOOD PRESSURE: 179 MMHG | DIASTOLIC BLOOD PRESSURE: 93 MMHG | WEIGHT: 217 LBS

## 2022-03-24 DIAGNOSIS — I10 PRIMARY HYPERTENSION: ICD-10-CM

## 2022-03-24 DIAGNOSIS — I35.0 NONRHEUMATIC AORTIC VALVE STENOSIS: ICD-10-CM

## 2022-03-24 DIAGNOSIS — I25.10 CORONARY ARTERY DISEASE INVOLVING NATIVE CORONARY ARTERY OF NATIVE HEART WITHOUT ANGINA PECTORIS: Primary | ICD-10-CM

## 2022-03-24 DIAGNOSIS — E78.2 MIXED HYPERLIPIDEMIA: ICD-10-CM

## 2022-03-24 PROCEDURE — 99213 OFFICE O/P EST LOW 20 MIN: CPT | Performed by: INTERNAL MEDICINE

## 2022-03-24 RX ORDER — ROSUVASTATIN CALCIUM 5 MG/1
5 TABLET, COATED ORAL NIGHTLY
Qty: 90 TABLET | Refills: 3 | Status: SHIPPED | OUTPATIENT
Start: 2022-03-24 | End: 2022-04-08
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
## 2022-03-28 ENCOUNTER — TELEPHONE (OUTPATIENT)
Dept: PSYCHIATRY | Facility: CLINIC | Age: 57
End: 2022-03-28

## 2022-03-30 ENCOUNTER — HOSPITAL ENCOUNTER (OUTPATIENT)
Dept: CT IMAGING | Facility: HOSPITAL | Age: 57
Discharge: HOME | End: 2022-03-30
Admitting: NURSE PRACTITIONER

## 2022-03-30 DIAGNOSIS — R91.1 LUNG NODULE: ICD-10-CM

## 2022-03-30 PROCEDURE — 71250 CT THORAX DX C-: CPT

## 2022-04-01 DIAGNOSIS — F41.1 GENERALIZED ANXIETY DISORDER: Primary | ICD-10-CM

## 2022-04-01 DIAGNOSIS — F33.1 MAJOR DEPRESSIVE DISORDER, RECURRENT EPISODE, MODERATE: ICD-10-CM

## 2022-04-01 DIAGNOSIS — I71.20 THORACIC AORTIC ANEURYSM WITHOUT RUPTURE: Primary | ICD-10-CM

## 2022-04-01 DIAGNOSIS — G47.00 INSOMNIA, UNSPECIFIED TYPE: ICD-10-CM

## 2022-04-01 RX ORDER — LAMOTRIGINE 25 MG/1
25 TABLET ORAL
Qty: 30 TABLET | Refills: 0 | Status: SHIPPED | OUTPATIENT
Start: 2022-04-01 | End: 2023-03-27

## 2022-04-07 ENCOUNTER — TELEPHONE (OUTPATIENT)
Dept: FAMILY MEDICINE CLINIC | Facility: CLINIC | Age: 57
End: 2022-04-07

## 2022-04-08 ENCOUNTER — OFFICE VISIT (OUTPATIENT)
Dept: CARDIOLOGY | Facility: CLINIC | Age: 57
End: 2022-04-08

## 2022-04-08 VITALS
DIASTOLIC BLOOD PRESSURE: 78 MMHG | HEIGHT: 67 IN | WEIGHT: 225 LBS | HEART RATE: 68 BPM | SYSTOLIC BLOOD PRESSURE: 174 MMHG | BODY MASS INDEX: 35.31 KG/M2

## 2022-04-08 DIAGNOSIS — I71.20 THORACIC AORTIC ANEURYSM WITHOUT RUPTURE: ICD-10-CM

## 2022-04-08 DIAGNOSIS — I10 PRIMARY HYPERTENSION: Primary | ICD-10-CM

## 2022-04-08 PROCEDURE — 99214 OFFICE O/P EST MOD 30 MIN: CPT | Performed by: INTERNAL MEDICINE

## 2022-04-08 RX ORDER — ROSUVASTATIN CALCIUM 20 MG/1
20 TABLET, COATED ORAL NIGHTLY
Qty: 90 TABLET | Refills: 3 | Status: SHIPPED | OUTPATIENT
Start: 2022-04-08 | End: 2022-09-26

## 2022-04-08 RX ORDER — SPIRONOLACTONE 50 MG/1
50 TABLET, FILM COATED ORAL DAILY
Qty: 90 TABLET | Refills: 1 | Status: SHIPPED | OUTPATIENT
Start: 2022-04-08 | End: 2022-11-29 | Stop reason: SDUPTHER
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
## 2022-04-18 ENCOUNTER — APPOINTMENT (OUTPATIENT)
Dept: GENERAL RADIOLOGY | Facility: HOSPITAL | Age: 57
End: 2022-04-18

## 2022-04-18 ENCOUNTER — HOSPITAL ENCOUNTER (EMERGENCY)
Facility: HOSPITAL | Age: 57
Discharge: HOME | End: 2022-04-18
Attending: EMERGENCY MEDICINE | Admitting: EMERGENCY MEDICINE

## 2022-04-18 VITALS
TEMPERATURE: 99 F | SYSTOLIC BLOOD PRESSURE: 159 MMHG | OXYGEN SATURATION: 98 % | HEART RATE: 74 BPM | RESPIRATION RATE: 16 BRPM | HEIGHT: 67 IN | BODY MASS INDEX: 34.33 KG/M2 | DIASTOLIC BLOOD PRESSURE: 84 MMHG | WEIGHT: 218.7 LBS

## 2022-04-18 DIAGNOSIS — B34.9 VIRAL ILLNESS: Primary | ICD-10-CM

## 2022-04-18 DIAGNOSIS — J01.90 ACUTE SINUSITIS, RECURRENCE NOT SPECIFIED, UNSPECIFIED LOCATION: ICD-10-CM

## 2022-04-18 LAB
FLUAV AG NPH QL: NEGATIVE
FLUBV AG NPH QL IA: NEGATIVE
SARS-COV-2 RNA PNL SPEC NAA+PROBE: NOT DETECTED

## 2022-04-18 PROCEDURE — 71045 X-RAY EXAM CHEST 1 VIEW: CPT

## 2022-04-18 PROCEDURE — U0004 COV-19 TEST NON-CDC HGH THRU: HCPCS | Performed by: NURSE PRACTITIONER

## 2022-04-18 PROCEDURE — 99283 EMERGENCY DEPT VISIT LOW MDM: CPT

## 2022-04-18 PROCEDURE — 96374 THER/PROPH/DIAG INJ IV PUSH: CPT

## 2022-04-18 PROCEDURE — 87804 INFLUENZA ASSAY W/OPTIC: CPT | Performed by: NURSE PRACTITIONER

## 2022-04-18 PROCEDURE — 25010000002 DIPHENHYDRAMINE PER 50 MG: Performed by: NURSE PRACTITIONER

## 2022-04-18 RX ORDER — DIPHENHYDRAMINE HYDROCHLORIDE 50 MG/ML
25 INJECTION INTRAMUSCULAR; INTRAVENOUS ONCE
Status: COMPLETED | OUTPATIENT
Start: 2022-04-18 | End: 2022-04-18

## 2022-04-18 RX ORDER — DOXYCYCLINE HYCLATE 100 MG/1
100 TABLET, DELAYED RELEASE ORAL 2 TIMES DAILY
Qty: 20 TABLET | Refills: 0 | Status: SHIPPED | OUTPATIENT
Start: 2022-04-18 | End: 2022-05-09

## 2022-04-18 RX ADMIN — DIPHENHYDRAMINE HYDROCHLORIDE 25 MG: 50 INJECTION INTRAMUSCULAR; INTRAVENOUS at 12:28
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
## 2022-04-18 NOTE — ED NOTES
Pt states she has a cough and and a rash, was seen at [facility] recently, within the last week

## 2022-04-19 RX ORDER — LEVOCETIRIZINE DIHYDROCHLORIDE 5 MG/1
TABLET, FILM COATED ORAL
Qty: 30 TABLET | Refills: 0 | Status: SHIPPED | OUTPATIENT
Start: 2022-04-19 | End: 2022-05-10

## 2022-04-21 ENCOUNTER — LAB (OUTPATIENT)
Dept: LAB | Facility: HOSPITAL | Age: 57
End: 2022-04-21

## 2022-04-21 DIAGNOSIS — I10 PRIMARY HYPERTENSION: ICD-10-CM

## 2022-04-21 LAB
ANION GAP SERPL CALCULATED.3IONS-SCNC: 11.7 MMOL/L (ref 5–15)
BUN SERPL-MCNC: 26 MG/DL (ref 6–20)
BUN/CREAT SERPL: 32.1 (ref 7–25)
CALCIUM SPEC-SCNC: 9.7 MG/DL (ref 8.6–10.5)
CHLORIDE SERPL-SCNC: 107 MMOL/L (ref 98–107)
CO2 SERPL-SCNC: 23.3 MMOL/L (ref 22–29)
CREAT SERPL-MCNC: 0.81 MG/DL (ref 0.57–1)
EGFRCR SERPLBLD CKD-EPI 2021: 85.9 ML/MIN/1.73
GLUCOSE SERPL-MCNC: 92 MG/DL (ref 65–99)
POTASSIUM SERPL-SCNC: 4.5 MMOL/L (ref 3.5–5.2)
SODIUM SERPL-SCNC: 142 MMOL/L (ref 136–145)

## 2022-04-21 PROCEDURE — 80048 BASIC METABOLIC PNL TOTAL CA: CPT

## 2022-04-21 PROCEDURE — 36415 COLL VENOUS BLD VENIPUNCTURE: CPT

## 2022-04-25 ENCOUNTER — TELEPHONE (OUTPATIENT)
Dept: CARDIOLOGY | Facility: CLINIC | Age: 57
End: 2022-04-25

## 2022-04-25 NOTE — TELEPHONE ENCOUNTER
On my recent office note I did not change her dose of Crestor, she was already on 20 mg daily at that visit, the only medication change was Aldactone.  When 2 medications are changed at the same time it is difficult to say which one is causing side effects.    I would recommend given her uncontrolled hypertension that she stay on the higher dose of Aldactone since she has so many other intolerances to blood pressure medication.  In order to determine which medication would be causing the upset stomach she can hold the Crestor at 5 mg and go back to the 50 mg of Aldactone and see if the symptoms recur.  Otherwise I am not going to know which one it is    CBD

## 2022-04-25 NOTE — TELEPHONE ENCOUNTER
Received VM from patient    SW patient. Patient c/o upset stomach with increased dosage of Crestor 20 mg and Aldactone 50 mg. Patient states she went back to original dose of Aldactone 25 mg and Crestor 5 mg. Patient states upset stomach has resolved.     Advised I would discuss with Dr Miller and return call with recommendations.

## 2022-05-09 ENCOUNTER — OFFICE VISIT (OUTPATIENT)
Dept: FAMILY MEDICINE CLINIC | Facility: CLINIC | Age: 57
End: 2022-05-09

## 2022-05-09 VITALS
HEIGHT: 67 IN | DIASTOLIC BLOOD PRESSURE: 82 MMHG | HEART RATE: 93 BPM | SYSTOLIC BLOOD PRESSURE: 142 MMHG | OXYGEN SATURATION: 99 % | WEIGHT: 227.4 LBS | TEMPERATURE: 98.1 F | BODY MASS INDEX: 35.69 KG/M2

## 2022-05-09 DIAGNOSIS — R21 RASH AND NONSPECIFIC SKIN ERUPTION: ICD-10-CM

## 2022-05-09 DIAGNOSIS — I25.10 CORONARY ARTERY DISEASE DUE TO CALCIFIED CORONARY LESION: ICD-10-CM

## 2022-05-09 DIAGNOSIS — I71.21 THORACIC ASCENDING AORTIC ANEURYSM: ICD-10-CM

## 2022-05-09 DIAGNOSIS — I25.84 CORONARY ARTERY DISEASE DUE TO CALCIFIED CORONARY LESION: ICD-10-CM

## 2022-05-09 DIAGNOSIS — R05.8 PRODUCTIVE COUGH: ICD-10-CM

## 2022-05-09 DIAGNOSIS — R06.02 SHORTNESS OF BREATH: Primary | ICD-10-CM

## 2022-05-09 DIAGNOSIS — Z86.16 HISTORY OF COVID-19: ICD-10-CM

## 2022-05-09 DIAGNOSIS — J45.40 MODERATE PERSISTENT ASTHMA WITHOUT COMPLICATION: ICD-10-CM

## 2022-05-09 PROCEDURE — 99215 OFFICE O/P EST HI 40 MIN: CPT | Performed by: NURSE PRACTITIONER

## 2022-05-09 PROCEDURE — 94618 PULMONARY STRESS TESTING: CPT | Performed by: NURSE PRACTITIONER

## 2022-05-09 RX ORDER — LEVALBUTEROL TARTRATE 45 UG/1
1-2 AEROSOL, METERED ORAL EVERY 4 HOURS PRN
Qty: 15 G | Refills: 2 | Status: SHIPPED | OUTPATIENT
Start: 2022-05-09 | End: 2022-06-22 | Stop reason: SDUPTHER

## 2022-05-09 NOTE — ASSESSMENT & PLAN NOTE
Asthma is worsening.  The patient is experiencing frequent daytime asthma symptoms. She is experiencing no nighttime asthma symptoms.  Medications: begin Xopenex inhaler as needed.  Discussed medication dosage, use, side effects, and goals of treatment in detail.    Discussed technique for using MDIs and/or nebulizer.  Follow up in 1 month, or sooner should new symptoms or problems arise.  Since she has side effects to albuterol, I will try her on Xopenex which has less side effects for causing jitteriness or heart racing.  I have ordered a pulmonary function test.

## 2022-05-09 NOTE — PROGRESS NOTES
Chief Complaint  Shortness of Breath (History of COVID 3 months ago)    Subjective          Esme Mcneil presents to [facility]  History of Present Illness   56-year-old female presents today for a follow-up.  She is accompanied by her daughter today.  She had COVID 3 months ago and she has been complaining of shortness of breath when she walks to her mailbox ever since.  Her O2 sats are 99% on room air on arrival today.  She had a CT of the chest on 3/30/2022 which had an incidental finding of an a sending thoracic aortic aneurysm measuring 4.5 cm.  She also has severe coronary artery arthrosclerotic calcification.  She has a history of a cardiac stent.  She is on Crestor prophylactically.  Lipid panel 3 months ago was therapeutic.  She follows with cardiologist Dr. Miller.  She is also complaining of productive cough with light green thick sputum.  She denies any fever or chills.  She has asthma.  She states that she cannot take albuterol inhaler because it causes her to be jittery and increases her heart rate.  She also has allergies to steroids.  She had a chest x-ray on 4/18/2022 which was unremarkable.    She states that she has had a rash that comes and goes on her lower legs that is itchy.  She states she takes Benadryl and it relieves it.  She states she has seen an allergist in the past but the cause of the rash has not been figured out.    Current Outpatient Medications on File Prior to Visit   Medication Sig Dispense Refill   • albuterol sulfate  (90 Base) MCG/ACT inhaler Inhale 2 puffs Every 4 (Four) Hours As Needed for Wheezing or Shortness of Air. 18 g 5   • ascorbic acid (VITAMIN C) 500 MG tablet Take 1 tablet by mouth Daily.     • aspirin 81 MG EC tablet Take 81 mg by mouth Daily.     • Benzocaine-Menthol (Cepacol) 15-2.3 MG lozenge Dissolve 1 lozenge in the mouth 4 (Four) Times a Day. 16 lozenge 0   • caffeine 200 MG tablet Take 1 tablet by mouth  "Daily.     • dicyclomine (BENTYL) 10 MG capsule Take 1 capsule by mouth 3 (Three) Times a Day. 90 capsule 5   • EPINEPHrine (EPIPEN) 0.3 MG/0.3ML solution auto-injector injection Inject 0.3 mL under the skin into the appropriate area as directed See Admin Instructions. 1 each 1   • levocetirizine (XYZAL) 5 MG tablet Take 1 tablet by mouth once daily 30 tablet 0   • montelukast (SINGULAIR) 10 MG tablet Take 1 tablet by mouth Daily. 30 tablet 2   • omeprazole (priLOSEC) 40 MG capsule Take 1 capsule by mouth Daily. 30 capsule 2   • promethazine-dextromethorphan (PROMETHAZINE-DM) 6.25-15 MG/5ML syrup Take 5 mL by mouth 4 (Four) Times a Day As Needed for Cough. 118 mL 0   • rosuvastatin (Crestor) 20 MG tablet Take 1 tablet by mouth Every Night. 90 tablet 3   • spironolactone (ALDACTONE) 50 MG tablet Take 1 tablet by mouth Daily. 90 tablet 1   • valACYclovir (VALTREX) 1000 MG tablet Take 1 tablet by mouth Daily. 30 tablet 0   • lamoTRIgine (LaMICtal) 25 MG tablet Take 1 tablet by mouth every night at bedtime for 30 days. 30 tablet 0   • [DISCONTINUED] azithromycin (Zithromax Z-Mundo) 250 MG tablet Take 2 tab po today then take 1 tab po qd x 4 days 6 tablet 0   • [DISCONTINUED] benzonatate (TESSALON) 200 MG capsule Take 1 capsule by mouth 3 (Three) Times a Day As Needed for Cough. 40 capsule 0   • [DISCONTINUED] doxycycline (DORYX) 100 MG enteric coated tablet Take 1 tablet by mouth 2 (Two) Times a Day. 20 tablet 0     No current facility-administered medications on file prior to visit.       Objective   Vital Signs:   /82   Pulse 93   Temp 98.1 °F (36.7 °C)   Ht 168.9 cm (66.5\")   Wt 103 kg (227 lb 6.4 oz)   SpO2 99%   BMI 36.15 kg/m²     Physical Exam  Vitals reviewed.   Constitutional:       Appearance: Normal appearance. She is well-developed.   Neck:      Thyroid: No thyroid mass, thyromegaly or thyroid tenderness.   Cardiovascular:      Rate and Rhythm: Normal rate and regular rhythm.      Heart sounds: No " murmur heard.    No friction rub. No gallop.   Pulmonary:      Effort: Pulmonary effort is normal.      Breath sounds: Normal breath sounds. No wheezing or rhonchi.   Lymphadenopathy:      Cervical: No cervical adenopathy.   Skin:     General: Skin is warm and dry.   Neurological:      Mental Status: She is alert and oriented to person, place, and time.      Cranial Nerves: No cranial nerve deficit.   Psychiatric:         Mood and Affect: Mood and affect normal.         Behavior: Behavior normal.         Thought Content: Thought content normal. Thought content does not include homicidal or suicidal ideation.         Judgment: Judgment normal.        Result Review :     CMP    CMP 11/9/21 2/18/22 4/21/22   Glucose 85 84 92   BUN 23 (A) 23 (A) 26 (A)   Creatinine 0.59 0.77 0.81   eGFR Non African Am 106 78    Sodium 142 137 142   Potassium 4.1 4.0 4.5   Chloride 109 (A) 103 107   Calcium 8.9 9.7 9.7   Albumin  3.80    Total Bilirubin  0.5    Alkaline Phosphatase  85    AST (SGOT)  19    ALT (SGPT)  13    (A) Abnormal value       Comments are available for some flowsheets but are not being displayed.           CBC    CBC 2/18/22   WBC 6.81   RBC 4.13   Hemoglobin 12.7   Hematocrit 38.8   MCV 93.9   MCH 30.8   MCHC 32.7   RDW 13.0   Platelets 323           Lipid Panel    Lipid Panel 2/18/22   Total Cholesterol 185   Triglycerides 115   HDL Cholesterol 66 (A)   VLDL Cholesterol 20   LDL Cholesterol  99   LDL/HDL Ratio 1.45   (A) Abnormal value            TSH    TSH 2/18/22   TSH 2.040               Data reviewed: Radiologic studies CT chest and chest x-ray.          Assessment and Plan    Diagnoses and all orders for this visit:    1. Shortness of breath (Primary)  Comments:  6-minute walk test is unremarkable, no drop in O2 sats.  I discussed with her that she is not a candidate for oxygen.  Orders:  -     Walking Oximetry  -     Full Pulmonary Function Test With Bronchodilator; Future    2. Productive  cough  Comments:  I will have her collect a sputum for culture.  Orders:  -     Respiratory Culture - Sputum, Cough    3. Moderate persistent asthma without complication  Assessment & Plan:  Asthma is worsening.  The patient is experiencing frequent daytime asthma symptoms. She is experiencing no nighttime asthma symptoms.  Medications: begin Xopenex inhaler as needed.  Discussed medication dosage, use, side effects, and goals of treatment in detail.    Discussed technique for using MDIs and/or nebulizer.  Follow up in 1 month, or sooner should new symptoms or problems arise.  Since she has side effects to albuterol, I will try her on Xopenex which has less side effects for causing jitteriness or heart racing.  I have ordered a pulmonary function test.      4. Thoracic ascending aortic aneurysm (HCC)  Assessment & Plan:  I discussed with her that she will need to have annual scans to monitor the aortic aneurysm but that it is stable at this time.  I advised her that if she has any chest pain that she needs to call EMS.      5. Coronary artery disease due to calcified coronary lesion  Assessment & Plan:  I explained to her why she is on Crestor for prevention of stroke/MIs due to her coronary artery disease.      6. History of COVID-19    7. Rash and nonspecific skin eruption  Comments:  Rash resolved at this time.  Advised her if she has a rash again to make an appointment to be seen.    Other orders  -     levalbuterol (XOPENEX HFA) 45 MCG/ACT inhaler; Inhale 1-2 puffs Every 4 (Four) Hours As Needed for Shortness of Air.  Dispense: 15 g; Refill: 2    I spent 45 minutes caring for Esme on this date of service. This time includes time spent by me in the following activities:preparing for the visit, reviewing tests, performing a medically appropriate examination and/or evaluation , counseling and educating the patient/family/caregiver, ordering medications, tests, or procedures, documenting information in the  medical record and independently interpreting results and communicating that information with the patient/family/caregiver  Follow Up   Return in about 4 weeks (around 6/6/2022) for to est care with Dr. Acevedo.  Patient was given instructions and counseling regarding her condition or for health maintenance advice. Please see specific information pulled into the AVS if appropriate.

## 2022-05-09 NOTE — ASSESSMENT & PLAN NOTE
I discussed with her that she will need to have annual scans to monitor the aortic aneurysm but that it is stable at this time.  I advised her that if she has any chest pain that she needs to call EMS.

## 2022-05-09 NOTE — ASSESSMENT & PLAN NOTE
I explained to her why she is on Crestor for prevention of stroke/MIs due to her coronary artery disease.

## 2022-05-10 RX ORDER — LEVOCETIRIZINE DIHYDROCHLORIDE 5 MG/1
TABLET, FILM COATED ORAL
Qty: 30 TABLET | Refills: 0 | Status: SHIPPED | OUTPATIENT
Start: 2022-05-10 | End: 2022-06-22 | Stop reason: SDUPTHER

## 2022-06-20 ENCOUNTER — OFFICE VISIT (OUTPATIENT)
Dept: FAMILY MEDICINE CLINIC | Facility: CLINIC | Age: 57
End: 2022-06-20

## 2022-06-20 VITALS
TEMPERATURE: 98.4 F | DIASTOLIC BLOOD PRESSURE: 80 MMHG | HEIGHT: 67 IN | HEART RATE: 64 BPM | OXYGEN SATURATION: 99 % | BODY MASS INDEX: 35.6 KG/M2 | SYSTOLIC BLOOD PRESSURE: 148 MMHG | WEIGHT: 226.8 LBS

## 2022-06-20 DIAGNOSIS — I10 ESSENTIAL HYPERTENSION: Primary | ICD-10-CM

## 2022-06-20 DIAGNOSIS — Z12.31 BREAST CANCER SCREENING BY MAMMOGRAM: ICD-10-CM

## 2022-06-20 DIAGNOSIS — G56.01 CARPAL TUNNEL SYNDROME OF RIGHT WRIST: ICD-10-CM

## 2022-06-20 PROCEDURE — 99214 OFFICE O/P EST MOD 30 MIN: CPT | Performed by: FAMILY MEDICINE

## 2022-06-22 NOTE — TELEPHONE ENCOUNTER
Mr Mcneil had an appt with Dr Acevedo on Monday 20th  and she told Dr Acevedo about her refills. She is stating that they have not been filled at this time, so she is putting the request in again. She is also asking for a call back and would like to know when the have been put in for pickup.

## 2022-06-23 RX ORDER — LEVOCETIRIZINE DIHYDROCHLORIDE 5 MG/1
5 TABLET, FILM COATED ORAL DAILY
Qty: 30 TABLET | Refills: 0 | Status: SHIPPED | OUTPATIENT
Start: 2022-06-23 | End: 2022-07-20

## 2022-06-23 RX ORDER — ALBUTEROL SULFATE 90 UG/1
2 AEROSOL, METERED RESPIRATORY (INHALATION) EVERY 4 HOURS PRN
Qty: 18 G | Refills: 5 | Status: SHIPPED | OUTPATIENT
Start: 2022-06-23

## 2022-06-23 RX ORDER — DICYCLOMINE HYDROCHLORIDE 10 MG/1
10 CAPSULE ORAL 3 TIMES DAILY
Qty: 90 CAPSULE | Refills: 5 | Status: SHIPPED | OUTPATIENT
Start: 2022-06-23

## 2022-06-23 RX ORDER — EPINEPHRINE 0.3 MG/.3ML
0.3 INJECTION SUBCUTANEOUS SEE ADMIN INSTRUCTIONS
Qty: 1 EACH | Refills: 1 | Status: SHIPPED | OUTPATIENT
Start: 2022-06-23 | End: 2022-12-05

## 2022-06-23 RX ORDER — OMEPRAZOLE 40 MG/1
40 CAPSULE, DELAYED RELEASE ORAL DAILY
Qty: 30 CAPSULE | Refills: 2 | Status: SHIPPED | OUTPATIENT
Start: 2022-06-23 | End: 2022-09-13

## 2022-06-23 RX ORDER — MONTELUKAST SODIUM 10 MG/1
10 TABLET ORAL DAILY
Qty: 30 TABLET | Refills: 2 | Status: SHIPPED | OUTPATIENT
Start: 2022-06-23 | End: 2022-09-13

## 2022-06-23 RX ORDER — LEVALBUTEROL TARTRATE 45 UG/1
1-2 AEROSOL, METERED ORAL EVERY 4 HOURS PRN
Qty: 15 G | Refills: 2 | Status: SHIPPED | OUTPATIENT
Start: 2022-06-23 | End: 2023-02-13

## 2022-07-20 RX ORDER — LEVOCETIRIZINE DIHYDROCHLORIDE 5 MG/1
TABLET, FILM COATED ORAL
Qty: 30 TABLET | Refills: 0 | Status: SHIPPED | OUTPATIENT
Start: 2022-07-20 | End: 2022-08-17

## 2022-08-10 ENCOUNTER — HOSPITAL ENCOUNTER (OUTPATIENT)
Dept: MAMMOGRAPHY | Facility: HOSPITAL | Age: 57
Discharge: HOME | End: 2022-08-10
Admitting: FAMILY MEDICINE

## 2022-08-10 DIAGNOSIS — Z12.31 BREAST CANCER SCREENING BY MAMMOGRAM: ICD-10-CM

## 2022-08-10 PROCEDURE — 77063 BREAST TOMOSYNTHESIS BI: CPT

## 2022-08-10 PROCEDURE — 77067 SCR MAMMO BI INCL CAD: CPT

## 2022-08-17 RX ORDER — LEVOCETIRIZINE DIHYDROCHLORIDE 5 MG/1
TABLET, FILM COATED ORAL
Qty: 30 TABLET | Refills: 0 | Status: SHIPPED | OUTPATIENT
Start: 2022-08-17 | End: 2022-09-13

## 2022-09-13 RX ORDER — LEVOCETIRIZINE DIHYDROCHLORIDE 5 MG/1
TABLET, FILM COATED ORAL
Qty: 30 TABLET | Refills: 0 | Status: SHIPPED | OUTPATIENT
Start: 2022-09-13 | End: 2022-10-19

## 2022-09-13 RX ORDER — MONTELUKAST SODIUM 10 MG/1
TABLET ORAL
Qty: 30 TABLET | Refills: 0 | Status: SHIPPED | OUTPATIENT
Start: 2022-09-13 | End: 2022-10-19

## 2022-09-13 RX ORDER — OMEPRAZOLE 40 MG/1
CAPSULE, DELAYED RELEASE ORAL
Qty: 30 CAPSULE | Refills: 0 | Status: SHIPPED | OUTPATIENT
Start: 2022-09-13 | End: 2022-10-19

## 2022-09-26 ENCOUNTER — OFFICE VISIT (OUTPATIENT)
Dept: CARDIOLOGY | Facility: CLINIC | Age: 57
End: 2022-09-26

## 2022-09-26 VITALS
WEIGHT: 222.6 LBS | HEART RATE: 86 BPM | HEIGHT: 66 IN | BODY MASS INDEX: 35.77 KG/M2 | DIASTOLIC BLOOD PRESSURE: 74 MMHG | SYSTOLIC BLOOD PRESSURE: 157 MMHG

## 2022-09-26 DIAGNOSIS — E78.2 MIXED HYPERLIPIDEMIA: ICD-10-CM

## 2022-09-26 DIAGNOSIS — I25.10 CORONARY ARTERY DISEASE INVOLVING NATIVE CORONARY ARTERY OF NATIVE HEART WITHOUT ANGINA PECTORIS: Primary | ICD-10-CM

## 2022-09-26 DIAGNOSIS — I10 ESSENTIAL HYPERTENSION: ICD-10-CM

## 2022-09-26 DIAGNOSIS — I35.0 NONRHEUMATIC AORTIC VALVE STENOSIS: ICD-10-CM

## 2022-09-26 PROBLEM — F10.21 ALCOHOLISM IN REMISSION: Status: RESOLVED | Noted: 2021-07-13 | Resolved: 2022-09-26

## 2022-09-26 PROBLEM — I50.9 CONGESTIVE HEART FAILURE: Status: RESOLVED | Noted: 2021-07-13 | Resolved: 2022-09-26

## 2022-09-26 PROCEDURE — 99214 OFFICE O/P EST MOD 30 MIN: CPT

## 2022-09-26 RX ORDER — EZETIMIBE 10 MG/1
10 TABLET ORAL DAILY
Qty: 30 TABLET | Refills: 3 | Status: SHIPPED | OUTPATIENT
Start: 2022-09-26 | End: 2023-02-13
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
## 2022-10-19 RX ORDER — LEVOCETIRIZINE DIHYDROCHLORIDE 5 MG/1
TABLET, FILM COATED ORAL
Qty: 30 TABLET | Refills: 0 | Status: SHIPPED | OUTPATIENT
Start: 2022-10-19 | End: 2022-11-17

## 2022-10-19 RX ORDER — OMEPRAZOLE 40 MG/1
CAPSULE, DELAYED RELEASE ORAL
Qty: 30 CAPSULE | Refills: 0 | Status: SHIPPED | OUTPATIENT
Start: 2022-10-19 | End: 2022-11-17

## 2022-10-19 RX ORDER — MONTELUKAST SODIUM 10 MG/1
TABLET ORAL
Qty: 30 TABLET | Refills: 0 | Status: SHIPPED | OUTPATIENT
Start: 2022-10-19 | End: 2022-11-17

## 2022-10-24 ENCOUNTER — TELEPHONE (OUTPATIENT)
Dept: OBSTETRICS AND GYNECOLOGY | Facility: CLINIC | Age: 57
End: 2022-10-24

## 2022-10-24 NOTE — TELEPHONE ENCOUNTER
Patient called this pm.  She states currently having an outbreak and she has an Rx for Valtrex but she is requesting the cream for additional help.  Last seen 10-25-21.  Last filled 10-25-21 Valtrex #90 with 3 refills.

## 2022-10-25 NOTE — TELEPHONE ENCOUNTER
Patient called back she states it is sore.  She is requesting the lidocaine be sent to her pharmacy

## 2022-10-27 ENCOUNTER — TELEPHONE (OUTPATIENT)
Dept: OBSTETRICS AND GYNECOLOGY | Facility: CLINIC | Age: 57
End: 2022-10-27

## 2022-10-27 RX ORDER — LIDOCAINE 50 MG/G
1 OINTMENT TOPICAL 3 TIMES DAILY PRN
Qty: 30 G | Refills: 0 | Status: SHIPPED | OUTPATIENT
Start: 2022-10-27 | End: 2022-11-22

## 2022-10-27 NOTE — TELEPHONE ENCOUNTER
The pharmacist Travis at [facility] called and said that Xylocaine is on backorder.  He wants to know if there is something else you may want the patient to try instead?  Please advise.  Thank you.

## 2022-10-27 NOTE — TELEPHONE ENCOUNTER
The pharmacist said that he has a 5 % lidocaine ointment but that is it.  Is this appropriate for the patient?

## 2022-10-27 NOTE — TELEPHONE ENCOUNTER
Lidocaine ointment 5% apply to affected area 2-3 times daily prn moderate to severe pain with genital ulcers#30 g NR

## 2022-10-28 NOTE — TELEPHONE ENCOUNTER
SHEY To Read:  Ok.  I have spoken with Dean at the pharmacy and they have received the medication.  It does need a prior authorization according to her insurance. I have left a voicemail message for the patient to call the office back so I can let her know the status of the medication.  Once a prior authorization has been received, we will fill this out and send back to her insurance to see if they will approve the medication.  We will notify her of the determination once we have received this information.

## 2022-11-16 RX ORDER — MONTELUKAST SODIUM 10 MG/1
TABLET ORAL
Qty: 30 TABLET | Refills: 0 | OUTPATIENT
Start: 2022-11-16

## 2022-11-16 RX ORDER — OMEPRAZOLE 40 MG/1
CAPSULE, DELAYED RELEASE ORAL
Qty: 30 CAPSULE | Refills: 0 | OUTPATIENT
Start: 2022-11-16

## 2022-11-16 RX ORDER — LEVOCETIRIZINE DIHYDROCHLORIDE 5 MG/1
TABLET, FILM COATED ORAL
Qty: 30 TABLET | Refills: 0 | OUTPATIENT
Start: 2022-11-16

## 2022-11-17 RX ORDER — OMEPRAZOLE 40 MG/1
CAPSULE, DELAYED RELEASE ORAL
Qty: 30 CAPSULE | Refills: 0 | Status: SHIPPED | OUTPATIENT
Start: 2022-11-17 | End: 2022-12-19

## 2022-11-17 RX ORDER — MONTELUKAST SODIUM 10 MG/1
TABLET ORAL
Qty: 30 TABLET | Refills: 0 | Status: SHIPPED | OUTPATIENT
Start: 2022-11-17 | End: 2022-12-19

## 2022-11-17 RX ORDER — LEVOCETIRIZINE DIHYDROCHLORIDE 5 MG/1
TABLET, FILM COATED ORAL
Qty: 30 TABLET | Refills: 0 | Status: SHIPPED | OUTPATIENT
Start: 2022-11-17 | End: 2022-12-19

## 2022-11-22 NOTE — TELEPHONE ENCOUNTER
HUB to Read  Per previous message from Emily Weller, no medication refills on Lidocaine.  This was for short-term use for the patient.  If the patient has not found relief, or symptoms remain she will need to schedule an appointment for evaluation.

## 2022-11-23 RX ORDER — SPIRONOLACTONE 50 MG/1
TABLET, FILM COATED ORAL
Qty: 90 TABLET | Refills: 0 | OUTPATIENT
Start: 2022-11-23

## 2022-11-29 DIAGNOSIS — A60.00 GENITAL HERPES SIMPLEX, UNSPECIFIED SITE: Primary | ICD-10-CM

## 2022-11-29 RX ORDER — SPIRONOLACTONE 50 MG/1
50 TABLET, FILM COATED ORAL DAILY
Qty: 90 TABLET | Refills: 3 | Status: SHIPPED | OUTPATIENT
Start: 2022-11-29

## 2022-11-29 RX ORDER — SPIRONOLACTONE 50 MG/1
TABLET, FILM COATED ORAL
Qty: 90 TABLET | Refills: 3 | Status: SHIPPED | OUTPATIENT
Start: 2022-11-29

## 2022-11-29 RX ORDER — LIDOCAINE 50 MG/G
OINTMENT TOPICAL
Qty: 36 EACH | Refills: 0 | OUTPATIENT
Start: 2022-11-29

## 2022-11-29 NOTE — TELEPHONE ENCOUNTER
Patient states she has been without medication since Thursday- requested refill from pharmacy. \    Refill sent to [facility] pharmacy as requested by patient.

## 2022-11-29 NOTE — TELEPHONE ENCOUNTER
Rx Refill Note  Requested Prescriptions     Pending Prescriptions Disp Refills   • lidocaine (XYLOCAINE) 5 % ointment 36 each 0     Sig: Apply  application topically to the appropriate area as directed 3 times daily as needed for moderate pain      Last seen 10-25-21.  Last filled 10-27-22 Lidocaine 5% ointment #30 with no refills      No appointment scheduled  {    Florecita Serrano MA  11/29/22, 08:10 EST

## 2022-12-05 RX ORDER — EPINEPHRINE 0.3 MG/.3ML
INJECTION SUBCUTANEOUS
Qty: 2 EACH | Refills: 0 | Status: SHIPPED | OUTPATIENT
Start: 2022-12-05 | End: 2023-01-20

## 2022-12-19 RX ORDER — OMEPRAZOLE 40 MG/1
CAPSULE, DELAYED RELEASE ORAL
Qty: 30 CAPSULE | Refills: 0 | Status: SHIPPED | OUTPATIENT
Start: 2022-12-19 | End: 2023-01-16

## 2022-12-19 RX ORDER — LEVOCETIRIZINE DIHYDROCHLORIDE 5 MG/1
TABLET, FILM COATED ORAL
Qty: 30 TABLET | Refills: 0 | Status: SHIPPED | OUTPATIENT
Start: 2022-12-19 | End: 2023-01-16

## 2022-12-19 RX ORDER — MONTELUKAST SODIUM 10 MG/1
TABLET ORAL
Qty: 30 TABLET | Refills: 0 | Status: SHIPPED | OUTPATIENT
Start: 2022-12-19 | End: 2023-01-16

## 2022-12-21 DIAGNOSIS — A60.04 HERPES, VULVAR: Primary | ICD-10-CM

## 2022-12-21 RX ORDER — VALACYCLOVIR HYDROCHLORIDE 1 G/1
1000 TABLET, FILM COATED ORAL DAILY
Qty: 90 TABLET | Refills: 3 | Status: SHIPPED | OUTPATIENT
Start: 2022-12-21

## 2022-12-21 NOTE — TELEPHONE ENCOUNTER
Rx Refill Note  Requested Prescriptions     Pending Prescriptions Disp Refills   • valACYclovir (VALTREX) 1000 MG tablet 90 tablet 3     Sig: Take 1 tablet by mouth Daily.      Last seen 10-25-21.  Last filled 10-25-21 Valtrex #90 with 3 refills      No appointment scheduled  23}                      Would you like a call back once the refill request has been completed: [] Yes [] No    If the office needs to give you a call back, can they leave a voicemail: [] Yes [] No    Florecita Serrano MA  12/21/22, 10:40 EST

## 2022-12-25 ENCOUNTER — APPOINTMENT (OUTPATIENT)
Dept: GENERAL RADIOLOGY | Facility: HOSPITAL | Age: 57
End: 2022-12-25

## 2022-12-25 ENCOUNTER — HOSPITAL ENCOUNTER (EMERGENCY)
Facility: HOSPITAL | Age: 57
Discharge: HOME | End: 2022-12-25
Attending: EMERGENCY MEDICINE | Admitting: EMERGENCY MEDICINE

## 2022-12-25 VITALS
TEMPERATURE: 98.8 F | HEIGHT: 66 IN | WEIGHT: 231.48 LBS | BODY MASS INDEX: 37.2 KG/M2 | RESPIRATION RATE: 16 BRPM | DIASTOLIC BLOOD PRESSURE: 99 MMHG | SYSTOLIC BLOOD PRESSURE: 145 MMHG | OXYGEN SATURATION: 98 % | HEART RATE: 67 BPM

## 2022-12-25 DIAGNOSIS — S52.125A CLOSED NONDISPLACED FRACTURE OF HEAD OF LEFT RADIUS, INITIAL ENCOUNTER: Primary | ICD-10-CM

## 2022-12-25 PROCEDURE — 73080 X-RAY EXAM OF ELBOW: CPT

## 2022-12-25 PROCEDURE — 73110 X-RAY EXAM OF WRIST: CPT

## 2022-12-25 PROCEDURE — 25010000002 KETOROLAC TROMETHAMINE PER 15 MG: Performed by: EMERGENCY MEDICINE

## 2022-12-25 PROCEDURE — 99283 EMERGENCY DEPT VISIT LOW MDM: CPT

## 2022-12-25 PROCEDURE — 96372 THER/PROPH/DIAG INJ SC/IM: CPT

## 2022-12-25 PROCEDURE — 73060 X-RAY EXAM OF HUMERUS: CPT

## 2022-12-25 RX ORDER — TRAMADOL HYDROCHLORIDE 50 MG/1
50 TABLET ORAL EVERY 8 HOURS PRN
Qty: 12 TABLET | Refills: 0 | Status: SHIPPED | OUTPATIENT
Start: 2022-12-25

## 2022-12-25 RX ORDER — KETOROLAC TROMETHAMINE 30 MG/ML
60 INJECTION, SOLUTION INTRAMUSCULAR; INTRAVENOUS ONCE
Status: COMPLETED | OUTPATIENT
Start: 2022-12-25 | End: 2022-12-25

## 2022-12-25 RX ORDER — TRAMADOL HYDROCHLORIDE 50 MG/1
50 TABLET ORAL EVERY 8 HOURS PRN
Qty: 12 TABLET | Refills: 0 | Status: SHIPPED | OUTPATIENT
Start: 2022-12-25 | End: 2022-12-25 | Stop reason: SDUPTHER

## 2022-12-25 RX ADMIN — KETOROLAC TROMETHAMINE 60 MG: 30 INJECTION, SOLUTION INTRAMUSCULAR at 11:51
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
## 2022-12-25 NOTE — ED NOTES
Applied Orthoglass sugar tongue splint to the left forearm. Applied left shoulder immobilizer. Positive pulse motor sensory distill post aplication.

## 2022-12-27 ENCOUNTER — OFFICE VISIT (OUTPATIENT)
Dept: ORTHOPEDIC SURGERY | Facility: CLINIC | Age: 57
End: 2022-12-27

## 2022-12-27 VITALS — HEIGHT: 66 IN | BODY MASS INDEX: 37.12 KG/M2 | WEIGHT: 231 LBS

## 2022-12-27 DIAGNOSIS — S52.102A CLOSED FRACTURE OF PROXIMAL END OF LEFT RADIUS, UNSPECIFIED FRACTURE MORPHOLOGY, INITIAL ENCOUNTER: Primary | ICD-10-CM

## 2022-12-27 PROCEDURE — 24650 CLTX RDL HEAD/NCK FX WO MNPJ: CPT | Performed by: ORTHOPAEDIC SURGERY

## 2022-12-27 PROCEDURE — 99204 OFFICE O/P NEW MOD 45 MIN: CPT | Performed by: ORTHOPAEDIC SURGERY

## 2022-12-27 NOTE — PROGRESS NOTES
"Chief Complaint  Initial Evaluation of the Left Elbow     Subjective      Esme Mcneil presents to [facility] for initial evaluation of the left elbow. Patient slipped on the ice and extended her arms to help cushion her blow.  She did not hit her head or pass out.   She has isolated left arm pain that she states is hurting from her mid humerus down to her wrist.  She presented to clinic with a splint.      Allergies   Allergen Reactions   • Bee Venom Anaphylaxis   • Rexulti [Brexpiprazole] Other (See Comments)     Lethargic, difficulty walking and talking   • Zolpidem Mental Status Change and Other (See Comments)     \"IT MAKE ME ATTEMPT SUICIDE\"    • Amphetamine-Dextroamphetamine Mental Status Change   • Duloxetine Hcl Headache   • Lexapro [Escitalopram] Mental Status Change   • Losartan Nausea Only and Other (See Comments)     Blood pressure gets too low   • Nefazodone Other (See Comments)   • Olanzapine Dizziness   • Paxil [Paroxetine] Other (See Comments)     \"makes me feel numb\"   • Seroquel [Quetiapine] Other (See Comments)     \"makes me sleepy\" \"made me grumpy, I don't like it\"   • Trazodone Other (See Comments)     Heavily sedated day after taking medication   • Venlafaxine Mental Status Change   • Albuterol Palpitations     Jittery   • Amlodipine GI Intolerance     UNKNOWN REACTION    • Amoxicillin GI Intolerance   • Ampicillin Rash   • Cephalexin Hives   • Codeine Itching   • Gabapentin Nausea Only   • Hydrocodone Itching and Other (See Comments)     \"pancreas issues\"   • Hydrocodone-Acetaminophen Itching   • Hydromorphone Nausea And Vomiting   • Ibuprofen Nausea And Vomiting   • Lisinopril Nausea Only   • Mirtazapine Other (See Comments)     \"MADE ME FEEL VERY LOOPY\"      • Oxycodone-Acetaminophen Itching   • Prednisone Other (See Comments)     \" CAUSED PANCREATITIS\"      • Sulfa Antibiotics Hives        Social History     Socioeconomic History   • Marital status: " "   Tobacco Use   • Smoking status: Former     Packs/day: 2.00     Years: 20.00     Pack years: 40.00     Types: Cigarettes     Quit date:      Years since quittin.0   • Smokeless tobacco: Never   Vaping Use   • Vaping Use: Never used   Substance and Sexual Activity   • Alcohol use: Not Currently     Comment: former   • Drug use: Yes     Types: Marijuana     Comment: \"upper, downers, in betweeners, our coffe table was always full\"   • Sexual activity: Not Currently        Review of Systems     Objective   Vital Signs:   Ht 167.6 cm (66\")   Wt 105 kg (231 lb)   BMI 37.28 kg/m²       Physical Exam  Constitutional:       Appearance: Normal appearance. Patient is well-developed and normal weight.   HENT:      Head: Normocephalic.      Right Ear: Hearing and external ear normal.      Left Ear: Hearing and external ear normal.      Nose: Nose normal.   Eyes:      Conjunctiva/sclera: Conjunctivae normal.   Cardiovascular:      Rate and Rhythm: Normal rate.   Pulmonary:      Effort: Pulmonary effort is normal.      Breath sounds: No wheezing or rales.   Abdominal:      Palpations: Abdomen is soft.      Tenderness: There is no abdominal tenderness.   Musculoskeletal:      Cervical back: Normal range of motion.   Skin:     Findings: No rash.   Neurological:      Mental Status: Patient is alert and oriented to person, place, and time.   Psychiatric:         Mood and Affect: Mood and affect normal.         Judgment: Judgment normal.       Ortho Exam      LEFT ELBOW radial pulse 2+. Ulnar pulse 2+. Good tone of deltoid, bicep, triceps, wrist extensors and flexors. Full , thumb opposition, MCP flexors, DIP flexors and PIP flexors. Moderate swelling of the left elbow.  Sensation intact. Neurovascular Intact.       Orthopedic Injury Treatment    Date/Time: 2022 3:41 PM  Performed by: Aissatou Bonner MD  Authorized by: Aissatou Bonner MD   Injury location: elbow  Location details: left elbow  Injury " type: fracture  Pre-procedure neurovascular assessment: neurovascularly intact    Anesthesia:  Local anesthesia used: no    Sedation:  Patient sedated: no    Immobilization: cast (long arm)  Supplies used: Fiberglass.  Post-procedure neurovascular assessment: post-procedure neurovascularly intact  Patient tolerance: patient tolerated the procedure well with no immediate complications  Comments: Closed treatment was obtained and fiberglass cast was applied.  The patient tolerated the procedure without any complications.              Imaging Results (Most Recent)     None           Result Review :         XR Humerus Left    Result Date: 12/25/2022  Narrative: PROCEDURE: XR HUMERUS LEFT  COMPARISON: None  INDICATIONS: fall with injury/LEFT UPPER ARM PAIN  FINDINGS:  There is no evidence of acute fracture of the humerus.  There are no lytic or destructive bony lesions.  There is a suspected radial head fracture.  There is a joint effusion.      Impression:  No definite fracture of the humerus.  Suspected radial head fracture with hemarthrosis.      KIRK JONES MD       Electronically Signed and Approved By: KIRK JONES MD on 12/25/2022 at 11:20             XR Elbow 3+ View Left    Result Date: 12/25/2022  Narrative: PROCEDURE: XR ELBOW 3+ VW LEFT  COMPARISON: [facility], , XR HUMERUS LEFT, 12/25/2022, 10:59.  INDICATIONS: fall with injury/LEFT ELBOW PAIN  FINDINGS:  There is a large joint effusion with elevation of both the anterior and posterior fat pads.  There is a subtle nondisplaced fracture of the radial head.      Impression:  Nondisplaced radial head fracture.  Large joint effusion consistent with hemarthrosis.      KIRK JONES MD       Electronically Signed and Approved By: KIRK JONES MD on 12/25/2022 at 11:19             XR Wrist 3+ View Left    Result Date: 12/25/2022  Narrative: PROCEDURE: XR WRIST 3+ VW LEFT  COMPARISON: None  INDICATIONS: fall with injury/LEFT WRIST PAIN  FINDINGS:   There is diffuse osteopenia.  There is mild basal joint arthritic change.  There is no evidence of fracture.  There are no lytic or destructive bony lesions.      Impression:  Osteopenia and arthritis.  No acute abnormality.      KIRK JONES MD       Electronically Signed and Approved By: KIRK JONES MD on 12/25/2022 at 11:23                      Assessment and Plan     Diagnoses and all orders for this visit:    1. Closed fracture of proximal end of left radius, unspecified fracture morphology, initial encounter (Primary)        Discussed the treatment plan with the patient. She was put in a long arm cast.  Cast care was educated on.  Elevate above heart to reduce swelling. Cast off next visit and X ray    Call or return if worsening symptoms.    Follow Up     3 weeks.       Patient was given instructions and counseling regarding her condition or for health maintenance advice. Please see specific information pulled into the AVS if appropriate.     Scribed for Aissatou Bonner MD by Hoa Bowser MA.  12/27/22   15:26 EST    I have personally performed the services described in this document as scribed by the above individual and it is both accurate and complete. Aissatou Bonner MD 12/27/22

## 2023-01-03 ENCOUNTER — TELEPHONE (OUTPATIENT)
Dept: ORTHOPEDIC SURGERY | Facility: CLINIC | Age: 58
End: 2023-01-03

## 2023-01-03 ENCOUNTER — OFFICE VISIT (OUTPATIENT)
Dept: ORTHOPEDIC SURGERY | Facility: CLINIC | Age: 58
End: 2023-01-03
Payer: MEDICARE

## 2023-01-03 VITALS — HEART RATE: 74 BPM | OXYGEN SATURATION: 97 % | WEIGHT: 231 LBS | HEIGHT: 66 IN | BODY MASS INDEX: 37.12 KG/M2

## 2023-01-03 DIAGNOSIS — S52.102D CLOSED FRACTURE OF PROXIMAL END OF LEFT RADIUS WITH ROUTINE HEALING, UNSPECIFIED FRACTURE MORPHOLOGY, SUBSEQUENT ENCOUNTER: ICD-10-CM

## 2023-01-03 DIAGNOSIS — M25.522 LEFT ELBOW PAIN: Primary | ICD-10-CM

## 2023-01-03 PROCEDURE — 1159F MED LIST DOCD IN RCRD: CPT | Performed by: ORTHOPAEDIC SURGERY

## 2023-01-03 PROCEDURE — 29065 APPL CST SHO TO HAND LNG ARM: CPT | Performed by: ORTHOPAEDIC SURGERY

## 2023-01-03 PROCEDURE — 1160F RVW MEDS BY RX/DR IN RCRD: CPT | Performed by: ORTHOPAEDIC SURGERY

## 2023-01-03 PROCEDURE — 99024 POSTOP FOLLOW-UP VISIT: CPT | Performed by: ORTHOPAEDIC SURGERY

## 2023-01-03 NOTE — PROGRESS NOTES
Chief Complaint  Follow-up of the Left Elbow     Subjective      Esme Mcneil presents to [facility] for follow up of left elbow.  Patient slipped on the ice on 12/25/22 and extended her arms to help cushion her blow.  She did not hit her head or pass out.   She has isolated left arm pain that she states is hurting from her mid humerus down to her wrist.  She presented to clinic with a splint.  She came in today and had long arm cast removed.     Allergies   Allergen Reactions   • Bee Venom Anaphylaxis   • Rexulti [Brexpiprazole] Other (See Comments)     Lethargic, difficulty walking and talking   • Zolpidem Mental Status Change and Other (See Comments)     \"IT MAKE ME ATTEMPT SUICIDE\"    • Amphetamine-Dextroamphetamine Mental Status Change   • Duloxetine Hcl Headache   • Lexapro [Escitalopram] Mental Status Change   • Losartan Nausea Only and Other (See Comments)     Blood pressure gets too low   • Nefazodone Other (See Comments)   • Olanzapine Dizziness   • Paxil [Paroxetine] Other (See Comments)     \"makes me feel numb\"   • Seroquel [Quetiapine] Other (See Comments)     \"makes me sleepy\" \"made me grumpy, I don't like it\"   • Trazodone Other (See Comments)     Heavily sedated day after taking medication   • Venlafaxine Mental Status Change   • Albuterol Palpitations     Jittery   • Amlodipine GI Intolerance     UNKNOWN REACTION    • Amoxicillin GI Intolerance   • Ampicillin Rash   • Cephalexin Hives   • Codeine Itching   • Gabapentin Nausea Only   • Hydrocodone Itching and Other (See Comments)     \"pancreas issues\"   • Hydrocodone-Acetaminophen Itching   • Hydromorphone Nausea And Vomiting   • Ibuprofen Nausea And Vomiting   • Lisinopril Nausea Only   • Mirtazapine Other (See Comments)     \"MADE ME FEEL VERY LOOPY\"      • Oxycodone-Acetaminophen Itching   • Prednisone Other (See Comments)     \" CAUSED PANCREATITIS\"      • Sulfa Antibiotics Hives        Social History      Socioeconomic History   • Marital status:    Tobacco Use   • Smoking status: Former     Packs/day: 2.00     Years: 20.00     Pack years: 40.00     Types: Cigarettes     Quit date:      Years since quittin.0   • Smokeless tobacco: Never   Vaping Use   • Vaping Use: Never used   Substance and Sexual Activity   • Alcohol use: Not Currently     Comment: former   • Drug use: Yes     Types: Marijuana     Comment: \"upper, downers, in betweeners, our coffe table was always full\"   • Sexual activity: Not Currently        Review of Systems     Objective   Vital Signs:   Pulse 74   Ht 167.6 cm (66\")   Wt 105 kg (231 lb)   SpO2 97%   BMI 37.28 kg/m²       Physical Exam  Constitutional:       Appearance: Normal appearance. Patient is well-developed and normal weight.   HENT:      Head: Normocephalic.      Right Ear: Hearing and external ear normal.      Left Ear: Hearing and external ear normal.      Nose: Nose normal.   Eyes:      Conjunctiva/sclera: Conjunctivae normal.   Cardiovascular:      Rate and Rhythm: Normal rate.   Pulmonary:      Effort: Pulmonary effort is normal.      Breath sounds: No wheezing or rales.   Abdominal:      Palpations: Abdomen is soft.      Tenderness: There is no abdominal tenderness.   Musculoskeletal:      Cervical back: Normal range of motion.   Skin:     Findings: No rash.   Neurological:      Mental Status: Patient is alert and oriented to person, place, and time.   Psychiatric:         Mood and Affect: Mood and affect normal.         Judgment: Judgment normal.       Ortho Exam      LEFT ELBOW radial pulse 2+. Ulnar pulse 2+. Good tone of deltoid, bicep, triceps, wrist extensors and flexors. Full , thumb opposition, MCP flexors, DIP flexors and PIP flexors. Moderate swelling of the left elbow.  Sensation intact. Neurovascular Intact.       Orthopedic Injury Treatment    Date/Time: 1/3/2023 3:25 PM  Performed by: Aissatou Bonner MD  Authorized by: Aissatou Bonner MD    Injury location: elbow  Location details: left elbow  Injury type: fracture  Pre-procedure neurovascular assessment: neurovascularly intact    Anesthesia:  Local anesthesia used: no    Sedation:  Patient sedated: no    Immobilization: cast (long arm)  Supplies used: Fiberglass.  Post-procedure neurovascular assessment: post-procedure neurovascularly intact  Patient tolerance: patient tolerated the procedure well with no immediate complications  Comments: Patient was placed in fiberglass cast today.  The patient tolerated the procedure without any complications.              Imaging Results (Most Recent)     Procedure Component Value Units Date/Time    XR Elbow 2 View Left [[id number]] Resulted: 01/03/23 1431     Updated: 01/03/23 1435           Result Review :     X-Ray Report:  Left elbow X-Ray  Indication: Evaluation of the left elbow.   AP/Lateral view(s)  Findings: Good healing of the left radius. Good alignment.   Prior studies available for comparison: Yes      XR Humerus Left    Result Date: 12/25/2022  Narrative: PROCEDURE: XR HUMERUS LEFT  COMPARISON: None  INDICATIONS: fall with injury/LEFT UPPER ARM PAIN  FINDINGS:  There is no evidence of acute fracture of the humerus.  There are no lytic or destructive bony lesions.  There is a suspected radial head fracture.  There is a joint effusion.      Impression:  No definite fracture of the humerus.  Suspected radial head fracture with hemarthrosis.      KIRK JONES MD       Electronically Signed and Approved By: KIRK JONES MD on 12/25/2022 at 11:20             XR Elbow 3+ View Left    Result Date: 12/25/2022  Narrative: PROCEDURE: XR ELBOW 3+ VW LEFT  COMPARISON: [facility], , XR HUMERUS LEFT, 12/25/2022, 10:59.  INDICATIONS: fall with injury/LEFT ELBOW PAIN  FINDINGS:  There is a large joint effusion with elevation of both the anterior and posterior fat pads.  There is a subtle nondisplaced fracture of the radial head.      Impression:   Nondisplaced radial head fracture.  Large joint effusion consistent with hemarthrosis.      KIRK JONES MD       Electronically Signed and Approved By: KIRK JONES MD on 12/25/2022 at 11:19                  Assessment and Plan     Diagnoses and all orders for this visit:    1. Left elbow pain (Primary)  -     XR Elbow 2 View Left    2. Closed fracture of proximal end of left radius with routine healing, unspecified fracture morphology, subsequent encounter        Discussed the treatment plan with the patient. Patient long arm cast was removed. Long arm cast applied for 2 more weeks and then remove and X ray next visit. Cast care was educated on.  Elevate above heart to reduce swelling. Patient was given option of a removable splint for a couple weeks or a cast and she chose to put cast on.     Call or return if worsening symptoms.    Follow Up     2-3 weeks.       Patient was given instructions and counseling regarding her condition or for health maintenance advice. Please see specific information pulled into the AVS if appropriate.     Scribed for Aissatou Bonner MD by Hoa Bowser MA.  01/03/23   14:41 EST    I have personally performed the services described in this document as scribed by the above individual and it is both accurate and complete. Aissatou Bonner MD 01/03/23

## 2023-01-03 NOTE — TELEPHONE ENCOUNTER
PATIENT STATES HER CAST IS COMING APART DOWN BY HER HAND. SHE ALSO STATES THAT IT IS \"DIGGING\" INTO HER WRIST CAUSING A LOT OF PAIN. SHE ALSO REPORTS SWELLING IN THE HAND/FINGERS, I ADVISED HER TO CONTINUE ICING/ELEVATING, AND WIGGLING HER FINGERS TO HELP RELIEVE SWELLING.   BEST PHONE NUMBER [phone]

## 2023-01-03 NOTE — TELEPHONE ENCOUNTER
Caller: PATIENT    Relationship to patient: SELF     Best call back number: [phone]    Chief complaint: LEFT ARM/ELBOW RADIAL HEAD FX    Type of visit: POST OP    Requested date: PATIENT STATED DR. HUNTER WANTED TO PUSH HER APPT OUT BUT SHE WASN'T SURE HOW LONG.    If rescheduling, when is the original appointment: 01.13.23 AT 9:45 AM    Additional notes: PATIENT SEEN DR. HUNTER TODAY AND HE MENTIONED PUSHING HER APPT OUT DUE TO HER COMING INTO THE OFFICE TODAY. PATIENT HAS AN APPT ON 01.13.23 BUT SHE IS NEEDING TO PUSH IT OUT. PATIENT STATED SHE TRIED TO SCHEDULE THIS APPT WHILE IN THE OFFICE BUT THEY TOLD HER SOMEONE WOULD CALL HER BACK. I ATTEMPTED TO WARM TRANSFER CALL TO THE OFFICE TO DISCUSS SCHEDULING BUT RECEIVED NO ANSWER. THANK YOU!

## 2023-01-04 ENCOUNTER — TELEPHONE (OUTPATIENT)
Dept: ORTHOPEDIC SURGERY | Facility: CLINIC | Age: 58
End: 2023-01-04

## 2023-01-04 NOTE — TELEPHONE ENCOUNTER
Caller: Esme Mcneil     Relationship: SELF  Best call back number: [phone]    What is your medical concern? PT WAS INFORMED CAST SHOULD REMAIN FOR ABOUT TWO WEEKS BUT SHE IS SCHD WITH DR. HUNTER 01/13/23. PT WOULD LIKE TO KNOW IF SHE SHOULD KEEP APPT OF R/S. PLEASE ADVISE

## 2023-01-16 RX ORDER — LEVOCETIRIZINE DIHYDROCHLORIDE 5 MG/1
TABLET, FILM COATED ORAL
Qty: 30 TABLET | Refills: 0 | Status: SHIPPED | OUTPATIENT
Start: 2023-01-16 | End: 2023-02-20

## 2023-01-16 RX ORDER — MONTELUKAST SODIUM 10 MG/1
TABLET ORAL
Qty: 30 TABLET | Refills: 0 | Status: SHIPPED | OUTPATIENT
Start: 2023-01-16 | End: 2023-02-20

## 2023-01-16 RX ORDER — OMEPRAZOLE 40 MG/1
CAPSULE, DELAYED RELEASE ORAL
Qty: 30 CAPSULE | Refills: 0 | Status: SHIPPED | OUTPATIENT
Start: 2023-01-16 | End: 2023-02-20

## 2023-01-17 ENCOUNTER — OFFICE VISIT (OUTPATIENT)
Dept: ORTHOPEDIC SURGERY | Facility: CLINIC | Age: 58
End: 2023-01-17
Payer: MEDICARE

## 2023-01-17 VITALS — HEIGHT: 66 IN | WEIGHT: 231 LBS | OXYGEN SATURATION: 98 % | HEART RATE: 74 BPM | BODY MASS INDEX: 37.12 KG/M2

## 2023-01-17 DIAGNOSIS — M25.522 LEFT ELBOW PAIN: Primary | ICD-10-CM

## 2023-01-17 DIAGNOSIS — S52.102D CLOSED FRACTURE OF PROXIMAL END OF LEFT RADIUS WITH ROUTINE HEALING, UNSPECIFIED FRACTURE MORPHOLOGY, SUBSEQUENT ENCOUNTER: ICD-10-CM

## 2023-01-17 PROCEDURE — 99024 POSTOP FOLLOW-UP VISIT: CPT | Performed by: ORTHOPAEDIC SURGERY

## 2023-01-17 RX ORDER — VALACYCLOVIR HYDROCHLORIDE 500 MG/1
TABLET, FILM COATED ORAL
COMMUNITY

## 2023-01-17 RX ORDER — HYDROXYZINE 50 MG/1
TABLET, FILM COATED ORAL
COMMUNITY
End: 2023-02-13

## 2023-01-17 RX ORDER — CYANOCOBALAMIN 1000 UG/ML
1 INJECTION, SOLUTION INTRAMUSCULAR; SUBCUTANEOUS
COMMUNITY
End: 2023-02-13

## 2023-01-17 NOTE — PROGRESS NOTES
"Chief Complaint  Follow-up of the Left Elbow and Cast Removal     Subjective      Esme Mcneil presents to [facility] for follow up of the left elbow.  Patient slipped on the ice on 12/25/22 and extended her arms to help cushion her fall.  She had her cast removed in office today and X rays taken.  She reports limited pain.      Allergies   Allergen Reactions   • Bee Venom Anaphylaxis   • Rexulti [Brexpiprazole] Other (See Comments)     Lethargic, difficulty walking and talking   • Zolpidem Mental Status Change and Other (See Comments)     \"IT MAKE ME ATTEMPT SUICIDE\"    • Amphetamine-Dextroamphetamine Mental Status Change   • Duloxetine Hcl Headache   • Lexapro [Escitalopram] Mental Status Change   • Losartan Nausea Only and Other (See Comments)     Blood pressure gets too low   • Nefazodone Other (See Comments)   • Olanzapine Dizziness   • Paxil [Paroxetine] Other (See Comments)     \"makes me feel numb\"   • Seroquel [Quetiapine] Other (See Comments)     \"makes me sleepy\" \"made me grumpy, I don't like it\"   • Trazodone Other (See Comments)     Heavily sedated day after taking medication   • Venlafaxine Mental Status Change   • Wasp Venom Protein Other (See Comments)   • Albuterol Palpitations     Jittery   • Amlodipine GI Intolerance     UNKNOWN REACTION    • Amoxicillin GI Intolerance   • Ampicillin Rash   • Cephalexin Hives   • Codeine Itching   • Gabapentin Nausea Only   • Hydrocodone Itching and Other (See Comments)     \"pancreas issues\"   • Hydrocodone-Acetaminophen Itching   • Hydromorphone Nausea And Vomiting   • Ibuprofen Nausea And Vomiting   • Lisinopril Nausea Only   • Mirtazapine Other (See Comments)     \"MADE ME FEEL VERY LOOPY\"      • Oxycodone-Acetaminophen Itching   • Prednisone Other (See Comments)     \" CAUSED PANCREATITIS\"      • Sulfa Antibiotics Hives        Social History     Socioeconomic History   • Marital status:    Tobacco Use   • Smoking " "status: Former     Packs/day: 2.00     Years: 20.00     Pack years: 40.00     Types: Cigarettes     Quit date:      Years since quittin.0   • Smokeless tobacco: Never   Vaping Use   • Vaping Use: Never used   Substance and Sexual Activity   • Alcohol use: Not Currently     Comment: former   • Drug use: Yes     Types: Marijuana     Comment: \"upper, downers, in betweeners, our coffe table was always full\"   • Sexual activity: Not Currently        Review of Systems     Objective   Vital Signs:   Pulse 74   Ht 167.6 cm (66\")   Wt 105 kg (231 lb)   SpO2 98%   BMI 37.28 kg/m²       Physical Exam  Constitutional:       Appearance: Normal appearance. Patient is well-developed and normal weight.   HENT:      Head: Normocephalic.      Right Ear: Hearing and external ear normal.      Left Ear: Hearing and external ear normal.      Nose: Nose normal.   Eyes:      Conjunctiva/sclera: Conjunctivae normal.   Cardiovascular:      Rate and Rhythm: Normal rate.   Pulmonary:      Effort: Pulmonary effort is normal.      Breath sounds: No wheezing or rales.   Abdominal:      Palpations: Abdomen is soft.      Tenderness: There is no abdominal tenderness.   Musculoskeletal:      Cervical back: Normal range of motion.   Skin:     Findings: No rash.   Neurological:      Mental Status: Patient is alert and oriented to person, place, and time.   Psychiatric:         Mood and Affect: Mood and affect normal.         Judgment: Judgment normal.       Ortho Exam      LEFT ELBOW Tender lateral epicondyle. Tender medial epicondyle. Tender radial head. Sensation to light touch median, radial, ulnar nerve. Positive AIN, PIN, ulnar nerve motor function. Axillary sensation intact. Elbow ROM 0-90. Negative Tinel's at the elbow. Full wrist flexion/extension.  Full , thumb opposition, MCP flexors, DIP flexors and PIP flexors.       Procedures      Imaging Results (Most Recent)     Procedure Component Value Units Date/Time    XR Elbow 2 " View Left [[id number]] Resulted: 01/17/23 1314     Updated: 01/17/23 1322           Result Review :     X-Ray Report:  Left hip and Left elbow X-Ray  Indication: Evaluation of the left elbow   AP/Lateral view(s)  Findings: Good healing of the radial head fracture.  Good alignment.  Mild joint effusion.   Prior studies available for comparison: Yes                Assessment and Plan     Diagnoses and all orders for this visit:    1. Left elbow pain (Primary)  -     XR Elbow 2 View Left  -     Ambulatory Referral to Physical Therapy Evaluate and treat    2. Closed fracture of proximal end of left radius with routine healing, unspecified fracture morphology, subsequent encounter        Discussed the treatment plan with the patient. I reviewed the X-rays that were obtained today with the patient.  Discussed conservative measures as exercises, anti-inflammatory and physical therapy.  The patient expressed understanding and wished to proceed. She was prescribed physical therapy for strengthening of the left elbow due to recent left radial head fracture.     Educated on risk of elevated BMI related to procedure.  Discussed options for weight loss/decreasing BMI prior to procedure including dietician consult, weight loss options and exercise program. and Call or return if worsening symptoms.    Follow Up     3-4 weeks with an X ray next visit.       Patient was given instructions and counseling regarding her condition or for health maintenance advice. Please see specific information pulled into the AVS if appropriate.     Scribed for Aissatou Bonner MD by Hoa Bowser MA.  01/17/23   13:41 EST    I have personally performed the services described in this document as scribed by the above individual and it is both accurate and complete. Aissatou Bonner MD 01/17/23

## 2023-01-20 RX ORDER — EPINEPHRINE 0.3 MG/.3ML
INJECTION SUBCUTANEOUS
Qty: 2 EACH | Refills: 0 | Status: SHIPPED | OUTPATIENT
Start: 2023-01-20 | End: 2023-02-27

## 2023-02-09 ENCOUNTER — OFFICE VISIT (OUTPATIENT)
Dept: ORTHOPEDIC SURGERY | Facility: CLINIC | Age: 58
End: 2023-02-09
Payer: MEDICARE

## 2023-02-09 VITALS — WEIGHT: 231 LBS | BODY MASS INDEX: 37.12 KG/M2 | HEIGHT: 66 IN

## 2023-02-09 DIAGNOSIS — S52.102D CLOSED FRACTURE OF PROXIMAL END OF LEFT RADIUS WITH ROUTINE HEALING, UNSPECIFIED FRACTURE MORPHOLOGY, SUBSEQUENT ENCOUNTER: Primary | ICD-10-CM

## 2023-02-09 PROCEDURE — 99024 POSTOP FOLLOW-UP VISIT: CPT | Performed by: PHYSICIAN ASSISTANT

## 2023-02-09 NOTE — PATIENT INSTRUCTIONS
X-rays taken and reviewed. Advised to continue home exercises. Patient will call should she decide to pursue PT. Advised gradual return to activity as tolerated over the next 2-3 weeks.     Follow up as needed. Call with changes or concerns.

## 2023-02-09 NOTE — PROGRESS NOTES
"Chief Complaint  Pain and Follow-up of the Left Elbow    Subjective      Esme Mcneil presents to [facility] for follow-up of left radial head fracture sustained in fall on 12/25/2022.  She was initially treated with immobilization in cast, which was removed in office on 1/17/2023 and was advised on gentle ROM to the left elbow.  Patient presents today for follow-up reporting that she can feel a palpable bony prominence to the left elbow.  Her pain has continued to improve.  She has been performing in home exercises.  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    Objective   Allergies   Allergen Reactions   • Bee Venom Anaphylaxis   • Rexulti [Brexpiprazole] Other (See Comments)     Lethargic, difficulty walking and talking   • Zolpidem Mental Status Change and Other (See Comments)     \"IT MAKE ME ATTEMPT SUICIDE\"    • Amphetamine-Dextroamphetamine Mental Status Change   • Duloxetine Hcl Headache   • Lexapro [Escitalopram] Mental Status Change   • Losartan Nausea Only and Other (See Comments)     Blood pressure gets too low   • Nefazodone Other (See Comments)   • Olanzapine Dizziness   • Paxil [Paroxetine] Other (See Comments)     \"makes me feel numb\"   • Seroquel [Quetiapine] Other (See Comments)     \"makes me sleepy\" \"made me grumpy, I don't like it\"   • Trazodone Other (See Comments)     Heavily sedated day after taking medication   • Venlafaxine Mental Status Change   • Wasp Venom Protein Other (See Comments)   • Albuterol Palpitations     Jittery   • Amlodipine GI Intolerance     UNKNOWN REACTION    • Amoxicillin GI Intolerance   • Ampicillin Rash   • Cephalexin Hives   • Codeine Itching   • Gabapentin Nausea Only   • Hydrocodone Itching and Other (See Comments)     \"pancreas issues\"   • Hydrocodone-Acetaminophen Itching   • Hydromorphone Nausea And Vomiting   • Ibuprofen Nausea And Vomiting   • Lisinopril " "Nausea Only   • Mirtazapine Other (See Comments)     \"MADE ME FEEL VERY LOOPY\"      • Oxycodone-Acetaminophen Itching   • Prednisone Other (See Comments)     \" CAUSED PANCREATITIS\"      • Sulfa Antibiotics Hives     Vital Signs:   Ht 167.6 cm (66\")   Wt 105 kg (231 lb)   BMI 37.28 kg/m²       Physical Exam    Constitutional: Awake, alert. Well nourished appearance.    Integumentary: Warm, dry, intact. No obvious rashes.    HENT: Atraumatic, normocephalic.   Respiratory: Non labored respirations .   Cardiovascular: Intact peripheral pulses.    Psychiatric: Normal mood and affect. A&O X3    Ortho Exam  Left elbow: Skin is warm, dry, and intact.  Mild tenderness to palpation and edema of the left radius.  Near full elbow extension and flexion.  Limited supination and pronation.  Patient is able to form a full fist.  Sensation intact to light touch.  Distal neurovascular intact.    Imaging Results (Most Recent)     Procedure Component Value Units Date/Time    XR Elbow 2 View Left [[id number]] Resulted: 02/10/23 1201     Updated: 02/10/23 1202    Narrative:      X-Ray Report:  Study: X-rays ordered, taken in the office, and reviewed today.   Site: Left elbow Xray  Indication: Fracture  View: AP/Lateral view(s)  Findings: Well-healing left radial head fracture, appropriate alignment.  Prior studies available for comparison: yes                     Assessment and Plan   Problem List Items Addressed This Visit    None  Visit Diagnoses     Closed fracture of proximal end of left radius with routine healing, unspecified fracture morphology, subsequent encounter    -  Primary    Relevant Orders    XR Elbow 2 View Left (Completed)        Follow Up   Return if symptoms worsen or fail to improve.  Patient is a former smoker.  Encourage continued tobacco cessation.  Options for smoking cessation.    Patient Instructions   X-rays taken and reviewed. Advised to continue home exercises. Patient will call should she decide to pursue " PT. Advised gradual return to activity as tolerated over the next 2-3 weeks.     Follow up as needed. Call with changes or concerns.     Patient was given instructions and counseling regarding her condition or for health maintenance advice. Please see specific information pulled into the AVS if appropriate.

## 2023-02-10 ENCOUNTER — OFFICE VISIT (OUTPATIENT)
Dept: OBSTETRICS AND GYNECOLOGY | Facility: CLINIC | Age: 58
End: 2023-02-10
Payer: MEDICARE

## 2023-02-10 VITALS
WEIGHT: 233.8 LBS | DIASTOLIC BLOOD PRESSURE: 99 MMHG | HEIGHT: 66 IN | SYSTOLIC BLOOD PRESSURE: 170 MMHG | BODY MASS INDEX: 37.57 KG/M2 | HEART RATE: 80 BPM

## 2023-02-10 DIAGNOSIS — R03.0 ELEVATED BLOOD PRESSURE READING: ICD-10-CM

## 2023-02-10 DIAGNOSIS — L02.32 BOIL OF BUTTOCK: Primary | ICD-10-CM

## 2023-02-10 PROCEDURE — 99213 OFFICE O/P EST LOW 20 MIN: CPT | Performed by: NURSE PRACTITIONER

## 2023-02-10 RX ORDER — DOXYCYCLINE 100 MG/1
100 CAPSULE ORAL 2 TIMES DAILY
Qty: 14 CAPSULE | Refills: 0 | Status: SHIPPED | OUTPATIENT
Start: 2023-02-10 | End: 2023-02-13
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
## 2023-02-13 ENCOUNTER — TELEPHONE (OUTPATIENT)
Dept: OBSTETRICS AND GYNECOLOGY | Facility: CLINIC | Age: 58
End: 2023-02-13
Payer: MEDICARE

## 2023-02-13 ENCOUNTER — OFFICE VISIT (OUTPATIENT)
Dept: FAMILY MEDICINE CLINIC | Facility: CLINIC | Age: 58
End: 2023-02-13
Payer: MEDICARE

## 2023-02-13 VITALS
OXYGEN SATURATION: 97 % | HEIGHT: 66 IN | TEMPERATURE: 97.8 F | DIASTOLIC BLOOD PRESSURE: 88 MMHG | WEIGHT: 228 LBS | BODY MASS INDEX: 36.64 KG/M2 | HEART RATE: 94 BPM | SYSTOLIC BLOOD PRESSURE: 144 MMHG

## 2023-02-13 DIAGNOSIS — R05.1 ACUTE COUGH: ICD-10-CM

## 2023-02-13 DIAGNOSIS — T50.905A ADVERSE EFFECT OF DRUG, INITIAL ENCOUNTER: Primary | ICD-10-CM

## 2023-02-13 DIAGNOSIS — R09.81 NASAL CONGESTION: ICD-10-CM

## 2023-02-13 DIAGNOSIS — N75.0 BARTHOLIN CYST: ICD-10-CM

## 2023-02-13 DIAGNOSIS — L50.9 URTICARIAL DERMATITIS: ICD-10-CM

## 2023-02-13 PROCEDURE — 99214 OFFICE O/P EST MOD 30 MIN: CPT | Performed by: NURSE PRACTITIONER

## 2023-02-13 RX ORDER — FAMOTIDINE 20 MG/1
20 TABLET, FILM COATED ORAL 2 TIMES DAILY PRN
Qty: 60 TABLET | Refills: 1 | Status: SHIPPED | OUTPATIENT
Start: 2023-02-13 | End: 2023-03-15

## 2023-02-13 RX ORDER — BROMPHENIRAMINE MALEATE, PSEUDOEPHEDRINE HYDROCHLORIDE, AND DEXTROMETHORPHAN HYDROBROMIDE 2; 30; 10 MG/5ML; MG/5ML; MG/5ML
5 SYRUP ORAL 4 TIMES DAILY PRN
Qty: 250 ML | Refills: 1 | Status: SHIPPED | OUTPATIENT
Start: 2023-02-13

## 2023-02-13 NOTE — TELEPHONE ENCOUNTER
"Spoke to the patient regarding this concern. She started the doxycycline 2/10. She states she broke out in a rash on over 80% of her body and noticed a metallic taste yesterday morning.  She denies any itching, swelling, etc. She states she did use her Epipen to try to \"counteract the medication\". She does have a visit with her PCP this afternoon to follow up on come cold symptoms. She was advised to discuss her Epipen usage at that visit with the PCP. Any further recommedations? Does she need a different antibiotic sent in for the boil? Please advise.  "

## 2023-02-13 NOTE — TELEPHONE ENCOUNTER
Provider: SAM JOHNSON  Caller: KSENIA PRATT  Relationship to Patient: SELF  Reason for Call: PT HAS DEVELOPED A RASH WITH ANTIBIOTIC. SHE HAS STOPPED MED, AND USED AN EPIPEN.  BREATHING IS OK.  PLEASE CALL TO DISCUSS.
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
## 2023-02-20 RX ORDER — OMEPRAZOLE 40 MG/1
CAPSULE, DELAYED RELEASE ORAL
Qty: 30 CAPSULE | Refills: 0 | Status: SHIPPED | OUTPATIENT
Start: 2023-02-20 | End: 2023-03-20

## 2023-02-20 RX ORDER — LEVOCETIRIZINE DIHYDROCHLORIDE 5 MG/1
TABLET, FILM COATED ORAL
Qty: 30 TABLET | Refills: 0 | Status: SHIPPED | OUTPATIENT
Start: 2023-02-20 | End: 2023-03-20

## 2023-02-20 RX ORDER — MONTELUKAST SODIUM 10 MG/1
TABLET ORAL
Qty: 30 TABLET | Refills: 0 | Status: SHIPPED | OUTPATIENT
Start: 2023-02-20 | End: 2023-03-20

## 2023-02-24 ENCOUNTER — OFFICE VISIT (OUTPATIENT)
Dept: OBSTETRICS AND GYNECOLOGY | Facility: CLINIC | Age: 58
End: 2023-02-24
Payer: MEDICARE

## 2023-02-24 VITALS
HEIGHT: 66 IN | HEART RATE: 75 BPM | BODY MASS INDEX: 37.77 KG/M2 | SYSTOLIC BLOOD PRESSURE: 154 MMHG | DIASTOLIC BLOOD PRESSURE: 83 MMHG | WEIGHT: 235 LBS

## 2023-02-24 DIAGNOSIS — L02.32 BOIL OF BUTTOCK: Primary | ICD-10-CM

## 2023-02-24 PROCEDURE — 99212 OFFICE O/P EST SF 10 MIN: CPT | Performed by: NURSE PRACTITIONER
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
## 2023-02-27 RX ORDER — EPINEPHRINE 0.3 MG/.3ML
INJECTION SUBCUTANEOUS
Qty: 2 EACH | Refills: 0 | Status: SHIPPED | OUTPATIENT
Start: 2023-02-27

## 2023-03-20 RX ORDER — OMEPRAZOLE 40 MG/1
CAPSULE, DELAYED RELEASE ORAL
Qty: 30 CAPSULE | Refills: 0 | Status: SHIPPED | OUTPATIENT
Start: 2023-03-20

## 2023-03-20 RX ORDER — LEVOCETIRIZINE DIHYDROCHLORIDE 5 MG/1
TABLET, FILM COATED ORAL
Qty: 30 TABLET | Refills: 0 | OUTPATIENT
Start: 2023-03-20

## 2023-03-20 RX ORDER — LEVOCETIRIZINE DIHYDROCHLORIDE 5 MG/1
TABLET, FILM COATED ORAL
Qty: 30 TABLET | Refills: 0 | Status: SHIPPED | OUTPATIENT
Start: 2023-03-20

## 2023-03-20 RX ORDER — MONTELUKAST SODIUM 10 MG/1
TABLET ORAL
Qty: 30 TABLET | Refills: 0 | OUTPATIENT
Start: 2023-03-20

## 2023-03-20 RX ORDER — MONTELUKAST SODIUM 10 MG/1
TABLET ORAL
Qty: 30 TABLET | Refills: 0 | Status: SHIPPED | OUTPATIENT
Start: 2023-03-20

## 2023-03-20 RX ORDER — OMEPRAZOLE 40 MG/1
CAPSULE, DELAYED RELEASE ORAL
Qty: 30 CAPSULE | Refills: 0 | OUTPATIENT
Start: 2023-03-20

## 2023-03-20 NOTE — TELEPHONE ENCOUNTER
Caller: Esme Mcneil    Relationship: Self    Best call back number: [phone]    Requested Prescriptions:   Requested Prescriptions     Pending Prescriptions Disp Refills   • omeprazole (priLOSEC) 40 MG capsule [Pharmacy Med Name: Omeprazole 40 MG Oral Capsule Delayed Release] 30 capsule 0     Sig: Take 1 capsule by mouth once daily   • montelukast (SINGULAIR) 10 MG tablet [Pharmacy Med Name: Montelukast Sodium 10 MG Oral Tablet] 30 tablet 0     Sig: Take 1 tablet by mouth once daily   • levocetirizine (XYZAL) 5 MG tablet [Pharmacy Med Name: Levocetirizine Dihydrochloride 5 MG Oral Tablet] 30 tablet 0     Sig: Take 1 tablet by mouth once daily        Pharmacy where request should be sent: [address] [phone]  - [phone] [facility]       Does the patient have less than a 3 day supply:  [x] Yes  [] No    Would you like a call back once the refill request has been completed: [x] Yes [] No    If the office needs to give you a call back, can they leave a voicemail: [x] Yes [] No    Rich Kent   03/20/23 15:09 EDT

## 2023-03-27 ENCOUNTER — OFFICE VISIT (OUTPATIENT)
Dept: CARDIOLOGY | Facility: CLINIC | Age: 58
End: 2023-03-27
Payer: MEDICARE

## 2023-03-27 VITALS
HEIGHT: 66 IN | BODY MASS INDEX: 38.6 KG/M2 | DIASTOLIC BLOOD PRESSURE: 83 MMHG | WEIGHT: 240.2 LBS | HEART RATE: 76 BPM | SYSTOLIC BLOOD PRESSURE: 152 MMHG

## 2023-03-27 DIAGNOSIS — I24.0 LEFT ANTERIOR DESCENDING (LAD) CORONARY ARTERY THROMBOSIS: ICD-10-CM

## 2023-03-27 DIAGNOSIS — I10 ESSENTIAL HYPERTENSION: ICD-10-CM

## 2023-03-27 DIAGNOSIS — G93.32 CHRONIC FATIGUE DISORDER: ICD-10-CM

## 2023-03-27 DIAGNOSIS — I35.0 NONRHEUMATIC AORTIC VALVE STENOSIS: ICD-10-CM

## 2023-03-27 DIAGNOSIS — I71.21 ANEURYSM OF ASCENDING AORTA WITHOUT RUPTURE: ICD-10-CM

## 2023-03-27 DIAGNOSIS — E78.2 MIXED HYPERLIPIDEMIA: Primary | ICD-10-CM

## 2023-03-27 PROCEDURE — 3077F SYST BP >= 140 MM HG: CPT | Performed by: INTERNAL MEDICINE

## 2023-03-27 PROCEDURE — 99214 OFFICE O/P EST MOD 30 MIN: CPT | Performed by: INTERNAL MEDICINE

## 2023-03-27 PROCEDURE — 3079F DIAST BP 80-89 MM HG: CPT | Performed by: INTERNAL MEDICINE

## 2023-03-27 RX ORDER — NEBIVOLOL 5 MG/1
5 TABLET ORAL DAILY
Qty: 90 TABLET | Refills: 3 | Status: SHIPPED | OUTPATIENT
Start: 2023-03-27
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
## 2023-04-24 RX ORDER — LEVOCETIRIZINE DIHYDROCHLORIDE 5 MG/1
TABLET, FILM COATED ORAL
Qty: 30 TABLET | Refills: 0 | OUTPATIENT
Start: 2023-04-24

## 2023-04-24 RX ORDER — OMEPRAZOLE 40 MG/1
40 CAPSULE, DELAYED RELEASE ORAL DAILY
Qty: 30 CAPSULE | Refills: 0 | OUTPATIENT
Start: 2023-04-24

## 2023-04-25 RX ORDER — OMEPRAZOLE 40 MG/1
40 CAPSULE, DELAYED RELEASE ORAL DAILY
Qty: 30 CAPSULE | Refills: 0 | OUTPATIENT
Start: 2023-04-25

## 2023-04-25 RX ORDER — LEVOCETIRIZINE DIHYDROCHLORIDE 5 MG/1
TABLET, FILM COATED ORAL
Qty: 30 TABLET | Refills: 0 | OUTPATIENT
Start: 2023-04-25

## 2023-05-01 ENCOUNTER — TELEPHONE (OUTPATIENT)
Dept: FAMILY MEDICINE CLINIC | Facility: CLINIC | Age: 58
End: 2023-05-01
Payer: MEDICARE

## 2023-05-01 NOTE — TELEPHONE ENCOUNTER
HUB TO READ    Patient has not had a visit with her PCP since 6/2022.  Patient needs to schedule an apt with Dr. Acevedo in order to obtain refills of her medications.

## 2023-05-23 ENCOUNTER — OFFICE VISIT (OUTPATIENT)
Dept: ORTHOPEDIC SURGERY | Facility: CLINIC | Age: 58
End: 2023-05-23
Payer: MEDICARE

## 2023-05-23 VITALS — BODY MASS INDEX: 38.57 KG/M2 | HEIGHT: 66 IN | WEIGHT: 240 LBS

## 2023-05-23 DIAGNOSIS — M25.532 LEFT WRIST PAIN: Primary | ICD-10-CM

## 2023-05-23 DIAGNOSIS — S52.102D CLOSED FRACTURE OF PROXIMAL END OF LEFT RADIUS WITH ROUTINE HEALING, UNSPECIFIED FRACTURE MORPHOLOGY, SUBSEQUENT ENCOUNTER: ICD-10-CM

## 2023-05-23 RX ORDER — FAMOTIDINE 20 MG/1
20 TABLET, FILM COATED ORAL
COMMUNITY
Start: 2023-02-13

## 2023-05-23 RX ORDER — EZETIMIBE 10 MG/1
10 TABLET ORAL DAILY
Qty: 30 TABLET | Refills: 11 | COMMUNITY
Start: 2023-05-16 | End: 2024-05-15

## 2023-05-23 RX ORDER — MONTELUKAST SODIUM 10 MG/1
10 TABLET ORAL DAILY
COMMUNITY
Start: 2023-03-21

## 2023-05-23 RX ORDER — LEVOCETIRIZINE DIHYDROCHLORIDE 5 MG/1
5 TABLET, FILM COATED ORAL DAILY
COMMUNITY
Start: 2023-03-21

## 2023-05-23 RX ORDER — VALACYCLOVIR HYDROCHLORIDE 1 G/1
1 TABLET, FILM COATED ORAL DAILY
COMMUNITY
Start: 2023-05-15

## 2023-05-23 RX ORDER — OMEPRAZOLE 40 MG/1
40 CAPSULE, DELAYED RELEASE ORAL DAILY
COMMUNITY
Start: 2023-03-21 | End: 2023-05-23

## 2023-05-23 NOTE — PROGRESS NOTES
"Chief Complaint  Follow-up of the left elbow and initial evaluation of Left Wrist    Subjective      Esme Mcneil presents to [facility] for follow-up of left radial head fracture sustained in fall on 12/25/2022.  Patient initially treated with immobilization in cast, removed in office on 1/17/2023.  Patient presents today for follow-up reporting she has had persistent left wrist and forearm pain since cast removal.  Reports approximately 2 weeks ago she was putting on her pants and caused hyperextension to her thumb.  She has had severe pain since that time.  She has returned to wrist brace.  Does report history of left wrist carpal tunnel syndrome symptoms, although states this feels \"different\".    Objective   Allergies   Allergen Reactions   • Bee Venom Anaphylaxis   • Rexulti [Brexpiprazole] Other (See Comments)     Lethargic, difficulty walking and talking   • Zolpidem Mental Status Change and Other (See Comments)     \"IT MAKE ME ATTEMPT SUICIDE\"    • Amphetamine-Dextroamphetamine Mental Status Change   • Duloxetine Hcl Headache   • Lexapro [Escitalopram] Mental Status Change   • Losartan Nausea Only and Other (See Comments)     Blood pressure gets too low   • Nefazodone Other (See Comments)   • Olanzapine Dizziness   • Paxil [Paroxetine] Other (See Comments)     \"makes me feel numb\"   • Seroquel [Quetiapine] Other (See Comments)     \"makes me sleepy\" \"made me grumpy, I don't like it\"   • Trazodone Other (See Comments)     Heavily sedated day after taking medication   • Venlafaxine Mental Status Change   • Wasp Venom Protein Other (See Comments)   • Albuterol Palpitations     Jittery   • Amlodipine GI Intolerance     UNKNOWN REACTION    • Amoxicillin GI Intolerance   • Ampicillin Rash   • Cephalexin Hives   • Codeine Itching   • Gabapentin Nausea Only   • Hydrocodone Itching and Other (See Comments)     \"pancreas issues\"   • Hydrocodone-Acetaminophen Itching   • " "Hydromorphone Nausea And Vomiting   • Ibuprofen Nausea And Vomiting   • Lisinopril Nausea Only   • Mirtazapine Other (See Comments)     \"MADE ME FEEL VERY LOOPY\"      • Oxycodone-Acetaminophen Itching   • Prednisone Other (See Comments)     \" CAUSED PANCREATITIS\"      • Sulfa Antibiotics Hives       Vital Signs:   Ht 167.6 cm (66\")   Wt 109 kg (240 lb)   BMI 38.74 kg/m²       Physical Exam    Constitutional: Awake, alert. Well nourished appearance.    Integumentary: Warm, dry, intact. No obvious rashes.    HENT: Atraumatic, normocephalic.   Respiratory: Non labored respirations .   Cardiovascular: Intact peripheral pulses.    Psychiatric: Normal mood and affect. A&O X3    Ortho Exam  Left elbow: Skin is warm, dry, and intact.  No tenderness to palpation.  Full pronation and supination.  Patient with full flexion and extension of the elbow.  Sensation intact light touch.  Distal neurovascular intact.    Left wrist: Pain with wrist ROM.  No tenderness to palpation of scaphoid.  Patient is able to form a full fist.  Good thumb opposition.  Sensation intact light touch.  Distal neurovascular intact.  Skin is warm, dry, and intact.  No obvious deformities.    Imaging Results (Most Recent)     Procedure Component Value Units Date/Time    XR Wrist 2 View Left [[id number]] Resulted: 05/23/23 1441     Updated: 05/23/23 1443    Narrative:      X-Ray Report:  Study: X-rays ordered, taken in the office, and reviewed today.   Site: Left wrist Xray  Indication: Pain  View: AP/Lateral view(s)  Findings: No acute osseous abnormalities identified.  Prior studies available for comparison: yes                     Assessment and Plan   Problem List Items Addressed This Visit    None  Visit Diagnoses     Left wrist pain    -  Primary    Relevant Orders    XR Wrist 2 View Left (Completed)    Ambulatory Referral to Occupational Therapy    Closed fracture of proximal end of left radius with routine healing, unspecified fracture " morphology, subsequent encounter            Follow Up   Return in about 6 weeks (around 7/4/2023).    Tobacco Use: Medium Risk   • Smoking Tobacco Use: Former   • Smokeless Tobacco Use: Never   • Passive Exposure: Not on file     Educated on risk of smoking. Discussed options for smoking cessation.    Patient Instructions   Patient is doing well from a left elbow standpoint. She is c/o left wrist pain. X-rays taken and reviewed. Advised to wean out of brace. Advised on gentle ROM. Referral for OT placed. Rx for voltaren gel sent to pharmacy.     Follow up in 6 weeks. No imaging. Call with changes or concerns.     Patient was given instructions and counseling regarding her condition or for health maintenance advice. Please see specific information pulled into the AVS if appropriate.

## 2023-05-23 NOTE — PATIENT INSTRUCTIONS
Patient is doing well from a left elbow standpoint. She is c/o left wrist pain. X-rays taken and reviewed. Advised to wean out of brace. Advised on gentle ROM. Referral for OT placed. Rx for voltaren gel sent to pharmacy.     Follow up in 6 weeks. No imaging. Call with changes or concerns.

## 2023-06-10 ENCOUNTER — HOSPITAL ENCOUNTER (EMERGENCY)
Facility: HOSPITAL | Age: 58
Discharge: HOME | End: 2023-06-10
Attending: EMERGENCY MEDICINE
Payer: MEDICARE

## 2023-06-10 VITALS
HEART RATE: 95 BPM | WEIGHT: 229.5 LBS | TEMPERATURE: 98.1 F | HEIGHT: 66 IN | RESPIRATION RATE: 18 BRPM | DIASTOLIC BLOOD PRESSURE: 77 MMHG | SYSTOLIC BLOOD PRESSURE: 146 MMHG | OXYGEN SATURATION: 100 % | BODY MASS INDEX: 36.88 KG/M2

## 2023-06-10 DIAGNOSIS — S76.211A INGUINAL STRAIN, RIGHT, INITIAL ENCOUNTER: Primary | ICD-10-CM

## 2023-06-10 PROCEDURE — 99283 EMERGENCY DEPT VISIT LOW MDM: CPT

## 2023-06-10 RX ORDER — CYCLOBENZAPRINE HCL 10 MG
10 TABLET ORAL 3 TIMES DAILY PRN
Qty: 21 TABLET | Refills: 0 | Status: SHIPPED | OUTPATIENT
Start: 2023-06-10 | End: 2023-06-17

## 2023-06-10 RX ORDER — CYCLOBENZAPRINE HCL 10 MG
10 TABLET ORAL ONCE
Status: COMPLETED | OUTPATIENT
Start: 2023-06-10 | End: 2023-06-10

## 2023-06-10 RX ADMIN — CYCLOBENZAPRINE 10 MG: 10 TABLET, FILM COATED ORAL at 18:56

## 2023-06-10 NOTE — Clinical Note
[facility] EMERGENCY ROOM  [address]  [place] [address]  Phone: [phone]    Esme Mcneil was seen and treated in our emergency department on 6/10/2023.  She may return to work on 06/12/2023.         Thank you for choosing [facility].    Abdiel Mcintyre PA-C
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
## 2023-06-10 NOTE — Clinical Note
[facility] EMERGENCY ROOM  [address]  [place] [address]  Phone: [phone]    Esme Mcneil was seen and treated in our emergency department on 6/10/2023.  She may return to gym class or sports on 06/18/2023.  The patient is to not perform any lawn care for one week. The patient may resume lawn care on 06/18/2023.        Thank you for choosing [facility].    Bonnie Durand RN

## 2023-08-29 RX ORDER — DICYCLOMINE HYDROCHLORIDE 10 MG/1
CAPSULE ORAL
Qty: 90 CAPSULE | Refills: 0 | Status: SHIPPED | OUTPATIENT
Start: 2023-08-29

## 2023-09-18 ENCOUNTER — HOSPITAL ENCOUNTER (OUTPATIENT)
Dept: MAMMOGRAPHY | Facility: HOSPITAL | Age: 58
Discharge: HOME | End: 2023-09-18
Admitting: NURSE PRACTITIONER
Payer: MEDICARE

## 2023-09-18 DIAGNOSIS — Z12.31 SCREENING MAMMOGRAM, ENCOUNTER FOR: ICD-10-CM

## 2023-09-18 PROCEDURE — 77067 SCR MAMMO BI INCL CAD: CPT

## 2023-09-18 PROCEDURE — 77063 BREAST TOMOSYNTHESIS BI: CPT

## 2023-10-17 RX ORDER — DICYCLOMINE HYDROCHLORIDE 10 MG/1
CAPSULE ORAL
Qty: 90 CAPSULE | Refills: 0 | OUTPATIENT
Start: 2023-10-17
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
## 2023-10-27 ENCOUNTER — OFFICE VISIT (OUTPATIENT)
Dept: OBSTETRICS AND GYNECOLOGY | Facility: CLINIC | Age: 58
End: 2023-10-27
Payer: MEDICARE

## 2023-10-27 VITALS
DIASTOLIC BLOOD PRESSURE: 81 MMHG | HEART RATE: 71 BPM | SYSTOLIC BLOOD PRESSURE: 129 MMHG | HEIGHT: 66 IN | BODY MASS INDEX: 37.61 KG/M2 | WEIGHT: 234 LBS

## 2023-10-27 DIAGNOSIS — Z01.419 WELL WOMAN EXAM: Primary | ICD-10-CM

## 2023-10-27 PROCEDURE — G0123 SCREEN CERV/VAG THIN LAYER: HCPCS

## 2023-10-27 PROCEDURE — 87624 HPV HI-RISK TYP POOLED RSLT: CPT

## 2023-10-31 LAB
CYTOLOGIST CVX/VAG CYTO: NORMAL
CYTOLOGY CVX/VAG DOC CYTO: NORMAL
CYTOLOGY CVX/VAG DOC THIN PREP: NORMAL
DX ICD CODE: NORMAL
HIV 1 & 2 AB SER-IMP: NORMAL
HPV I/H RISK 4 DNA CVX QL PROBE+SIG AMP: NEGATIVE
OTHER STN SPEC: NORMAL
STAT OF ADQ CVX/VAG CYTO-IMP: NORMAL

## 2023-12-04 RX ORDER — SPIRONOLACTONE 50 MG/1
50 TABLET, FILM COATED ORAL DAILY
Qty: 90 TABLET | Refills: 0 | OUTPATIENT
Start: 2023-12-04

## 2023-12-11 RX ORDER — SPIRONOLACTONE 50 MG/1
50 TABLET, FILM COATED ORAL DAILY
Qty: 90 TABLET | Refills: 0 | OUTPATIENT
Start: 2023-12-11

## 2023-12-27 RX ORDER — VALACYCLOVIR HYDROCHLORIDE 1 G/1
1000 TABLET, FILM COATED ORAL DAILY
Qty: 90 TABLET | Refills: 3 | Status: SHIPPED | OUTPATIENT
Start: 2023-12-27

## 2023-12-27 NOTE — TELEPHONE ENCOUNTER
Pharmacy requesting refill on Valtrex.  Last seen 12/21/22.  Last filled by you 12/21/22 Valtrex # [id number] with 3 refills.  No appointment scheduled

## 2024-09-12 RX ORDER — DICYCLOMINE HYDROCHLORIDE 10 MG/1
CAPSULE ORAL
Qty: 90 CAPSULE | Refills: 0 | OUTPATIENT
Start: 2024-09-12

## 2024-10-29 ENCOUNTER — TRANSCRIBE ORDERS (OUTPATIENT)
Dept: ADMINISTRATIVE | Facility: HOSPITAL | Age: 59
End: 2024-10-29
Payer: MEDICARE

## 2024-10-29 DIAGNOSIS — Z12.31 SCREENING MAMMOGRAM, ENCOUNTER FOR: Primary | ICD-10-CM

## 2024-11-06 ENCOUNTER — HOSPITAL ENCOUNTER (OUTPATIENT)
Dept: MAMMOGRAPHY | Facility: HOSPITAL | Age: 59
Discharge: HOME | End: 2024-11-06
Admitting: NURSE PRACTITIONER
Payer: MEDICARE

## 2024-11-06 DIAGNOSIS — Z12.31 SCREENING MAMMOGRAM, ENCOUNTER FOR: ICD-10-CM

## 2024-11-06 PROCEDURE — 77063 BREAST TOMOSYNTHESIS BI: CPT

## 2024-11-06 PROCEDURE — 77067 SCR MAMMO BI INCL CAD: CPT

## 2025-04-18 ENCOUNTER — TRANSCRIBE ORDERS (OUTPATIENT)
Dept: ADMINISTRATIVE | Facility: HOSPITAL | Age: 60
End: 2025-04-18
Payer: MEDICARE

## 2025-04-18 ENCOUNTER — LAB (OUTPATIENT)
Facility: HOSPITAL | Age: 60
End: 2025-04-18
Payer: MEDICARE

## 2025-04-18 DIAGNOSIS — R53.83 TIREDNESS: Primary | ICD-10-CM

## 2025-04-18 DIAGNOSIS — F33.1 MAJOR DEPRESSIVE DISORDER, RECURRENT EPISODE, MODERATE: ICD-10-CM

## 2025-04-18 DIAGNOSIS — I10 ESSENTIAL (PRIMARY) HYPERTENSION: ICD-10-CM

## 2025-04-18 DIAGNOSIS — I25.10 CVD (CARDIOVASCULAR DISEASE): ICD-10-CM

## 2025-04-18 DIAGNOSIS — R53.83 TIREDNESS: ICD-10-CM

## 2025-04-18 LAB
25(OH)D3 SERPL-MCNC: 79 NG/ML (ref 30–100)
ALBUMIN SERPL-MCNC: 3.8 G/DL (ref 3.5–5.2)
ALBUMIN/GLOB SERPL: 1.4 G/DL
ALP SERPL-CCNC: 97 U/L (ref 39–117)
ALT SERPL W P-5'-P-CCNC: 10 U/L (ref 1–33)
ANION GAP SERPL CALCULATED.3IONS-SCNC: 11 MMOL/L (ref 5–15)
AST SERPL-CCNC: 19 U/L (ref 1–32)
BASOPHILS # BLD AUTO: 0.04 10*3/MM3 (ref 0–0.2)
BASOPHILS NFR BLD AUTO: 0.9 % (ref 0–1.5)
BILIRUB SERPL-MCNC: 0.5 MG/DL (ref 0–1.2)
BUN SERPL-MCNC: 21 MG/DL (ref 6–20)
BUN/CREAT SERPL: 33.3 (ref 7–25)
CALCIUM SPEC-SCNC: 9.4 MG/DL (ref 8.6–10.5)
CHLORIDE SERPL-SCNC: 105 MMOL/L (ref 98–107)
CHOLEST SERPL-MCNC: 184 MG/DL (ref 0–200)
CO2 SERPL-SCNC: 24 MMOL/L (ref 22–29)
CREAT SERPL-MCNC: 0.63 MG/DL (ref 0.57–1)
DEPRECATED RDW RBC AUTO: 42.2 FL (ref 37–54)
EGFRCR SERPLBLD CKD-EPI 2021: 103 ML/MIN/1.73
EOSINOPHIL # BLD AUTO: 0.21 10*3/MM3 (ref 0–0.4)
EOSINOPHIL NFR BLD AUTO: 4.5 % (ref 0.3–6.2)
ERYTHROCYTE [DISTWIDTH] IN BLOOD BY AUTOMATED COUNT: 12.2 % (ref 12.3–15.4)
GLOBULIN UR ELPH-MCNC: 2.7 GM/DL
GLUCOSE SERPL-MCNC: 91 MG/DL (ref 65–99)
HCT VFR BLD AUTO: 35.2 % (ref 34–46.6)
HDLC SERPL-MCNC: 73 MG/DL (ref 40–60)
HGB BLD-MCNC: 11.6 G/DL (ref 12–15.9)
IMM GRANULOCYTES # BLD AUTO: 0.01 10*3/MM3 (ref 0–0.05)
IMM GRANULOCYTES NFR BLD AUTO: 0.2 % (ref 0–0.5)
LDLC SERPL CALC-MCNC: 100 MG/DL (ref 0–100)
LDLC/HDLC SERPL: 1.37 {RATIO}
LYMPHOCYTES # BLD AUTO: 1.45 10*3/MM3 (ref 0.7–3.1)
LYMPHOCYTES NFR BLD AUTO: 30.9 % (ref 19.6–45.3)
MCH RBC QN AUTO: 31.2 PG (ref 26.6–33)
MCHC RBC AUTO-ENTMCNC: 33 G/DL (ref 31.5–35.7)
MCV RBC AUTO: 94.6 FL (ref 79–97)
MONOCYTES # BLD AUTO: 0.29 10*3/MM3 (ref 0.1–0.9)
MONOCYTES NFR BLD AUTO: 6.2 % (ref 5–12)
NEUTROPHILS NFR BLD AUTO: 2.7 10*3/MM3 (ref 1.7–7)
NEUTROPHILS NFR BLD AUTO: 57.3 % (ref 42.7–76)
NRBC BLD AUTO-RTO: 0 /100 WBC (ref 0–0.2)
PLATELET # BLD AUTO: 285 10*3/MM3 (ref 140–450)
PMV BLD AUTO: 9.3 FL (ref 6–12)
POTASSIUM SERPL-SCNC: 3.7 MMOL/L (ref 3.5–5.2)
PROT SERPL-MCNC: 6.5 G/DL (ref 6–8.5)
RBC # BLD AUTO: 3.72 10*6/MM3 (ref 3.77–5.28)
SODIUM SERPL-SCNC: 140 MMOL/L (ref 136–145)
TRIGL SERPL-MCNC: 56 MG/DL (ref 0–150)
TSH SERPL DL<=0.05 MIU/L-ACNC: 1.46 UIU/ML (ref 0.27–4.2)
VIT B12 BLD-MCNC: 415 PG/ML (ref 211–946)
VLDLC SERPL-MCNC: 11 MG/DL (ref 5–40)
WBC NRBC COR # BLD AUTO: 4.7 10*3/MM3 (ref 3.4–10.8)

## 2025-04-18 PROCEDURE — 36415 COLL VENOUS BLD VENIPUNCTURE: CPT

## 2025-04-18 PROCEDURE — 85025 COMPLETE CBC W/AUTO DIFF WBC: CPT

## 2025-04-18 PROCEDURE — 84443 ASSAY THYROID STIM HORMONE: CPT

## 2025-04-18 PROCEDURE — 80053 COMPREHEN METABOLIC PANEL: CPT

## 2025-04-18 PROCEDURE — 82607 VITAMIN B-12: CPT

## 2025-04-18 PROCEDURE — 80061 LIPID PANEL: CPT

## 2025-04-18 PROCEDURE — 82306 VITAMIN D 25 HYDROXY: CPT

## 2025-05-12 ENCOUNTER — TRANSCRIBE ORDERS (OUTPATIENT)
Dept: ADMINISTRATIVE | Facility: HOSPITAL | Age: 60
End: 2025-05-12
Payer: MEDICARE

## 2025-05-12 DIAGNOSIS — I71.21 ANEURYSM OF THE ASCENDING AORTA, WITHOUT RUPTURE: Primary | ICD-10-CM

## 2025-05-23 ENCOUNTER — HOSPITAL ENCOUNTER (OUTPATIENT)
Dept: ULTRASOUND IMAGING | Facility: HOSPITAL | Age: 60
Discharge: HOME | End: 2025-05-23
Payer: MEDICARE

## 2025-05-23 DIAGNOSIS — I71.21 ANEURYSM OF THE ASCENDING AORTA, WITHOUT RUPTURE: ICD-10-CM

## 2025-05-23 PROCEDURE — 76775 US EXAM ABDO BACK WALL LIM: CPT

## 2025-06-24 ENCOUNTER — LAB (OUTPATIENT)
Facility: HOSPITAL | Age: 60
End: 2025-06-24
Payer: MEDICARE

## 2025-06-24 ENCOUNTER — TRANSCRIBE ORDERS (OUTPATIENT)
Facility: HOSPITAL | Age: 60
End: 2025-06-24
Payer: MEDICAID

## 2025-06-24 DIAGNOSIS — D50.9 IRON DEFICIENCY ANEMIA, UNSPECIFIED IRON DEFICIENCY ANEMIA TYPE: ICD-10-CM

## 2025-06-24 DIAGNOSIS — D50.9 IRON DEFICIENCY ANEMIA, UNSPECIFIED IRON DEFICIENCY ANEMIA TYPE: Primary | ICD-10-CM

## 2025-06-24 LAB — IRON 24H UR-MRATE: 49 MCG/DL (ref 37–145)

## 2025-06-24 PROCEDURE — 83540 ASSAY OF IRON: CPT

## 2025-06-24 PROCEDURE — 36415 COLL VENOUS BLD VENIPUNCTURE: CPT
